# Patient Record
Sex: FEMALE | Race: WHITE | NOT HISPANIC OR LATINO | URBAN - METROPOLITAN AREA
[De-identification: names, ages, dates, MRNs, and addresses within clinical notes are randomized per-mention and may not be internally consistent; named-entity substitution may affect disease eponyms.]

---

## 2017-05-17 ENCOUNTER — GENERIC CONVERSION - ENCOUNTER (OUTPATIENT)
Dept: OTHER | Facility: OTHER | Age: 82
End: 2017-05-17

## 2017-05-17 LAB
AMBIG ABBREV CMP14 DEFAULT (HISTORICAL): NORMAL
AMBIG ABBREV LP DEFAULT (HISTORICAL): NORMAL

## 2017-05-18 LAB
A/G RATIO (HISTORICAL): 1.9 (ref 1.2–2.2)
ALBUMIN SERPL BCP-MCNC: 4.1 G/DL (ref 3.5–4.7)
ALP SERPL-CCNC: 94 IU/L (ref 39–117)
ALT SERPL W P-5'-P-CCNC: 31 IU/L (ref 0–32)
AST SERPL W P-5'-P-CCNC: 24 IU/L (ref 0–40)
BASOPHILS # BLD AUTO: 0.1 X10E3/UL (ref 0–0.2)
BASOPHILS # BLD AUTO: 1 %
BILIRUB SERPL-MCNC: 0.6 MG/DL (ref 0–1.2)
BUN SERPL-MCNC: 22 MG/DL (ref 8–27)
BUN/CREA RATIO (HISTORICAL): 26 (ref 12–28)
CALCIUM IONIZED (HISTORICAL): 6.1 MG/DL (ref 4.5–5.6)
CALCIUM SERPL-MCNC: 10.6 MG/DL (ref 8.7–10.3)
CHLORIDE SERPL-SCNC: 100 MMOL/L (ref 96–106)
CHOLEST SERPL-MCNC: 175 MG/DL (ref 100–199)
CO2 SERPL-SCNC: 25 MMOL/L (ref 18–29)
CREAT SERPL-MCNC: 0.85 MG/DL (ref 0.57–1)
DEPRECATED RDW RBC AUTO: 13.2 % (ref 12.3–15.4)
EGFR AFRICAN AMERICAN (HISTORICAL): 70 ML/MIN/1.73
EGFR-AMERICAN CALC (HISTORICAL): 61 ML/MIN/1.73
EOSINOPHIL # BLD AUTO: 0.2 X10E3/UL (ref 0–0.4)
EOSINOPHIL # BLD AUTO: 4 %
GLUCOSE SERPL-MCNC: 197 MG/DL (ref 65–99)
HBA1C MFR BLD HPLC: 8 % (ref 4.8–5.6)
HCT VFR BLD AUTO: 46 % (ref 34–46.6)
HDLC SERPL-MCNC: 58 MG/DL
HGB BLD-MCNC: 15.2 G/DL (ref 11.1–15.9)
IMM.GRANULOCYTES (CD4/8) (HISTORICAL): 0 %
IMM.GRANULOCYTES (CD4/8) (HISTORICAL): 0 X10E3/UL (ref 0–0.1)
LDLC SERPL CALC-MCNC: 84 MG/DL (ref 0–99)
LYMPHOCYTES # BLD AUTO: 1.4 X10E3/UL (ref 0.7–3.1)
LYMPHOCYTES # BLD AUTO: 27 %
MCH RBC QN AUTO: 29.6 PG (ref 26.6–33)
MCHC RBC AUTO-ENTMCNC: 33 G/DL (ref 31.5–35.7)
MCV RBC AUTO: 90 FL (ref 79–97)
MONOCYTES # BLD AUTO: 0.4 X10E3/UL (ref 0.1–0.9)
MONOCYTES (HISTORICAL): 8 %
NEUTROPHILS # BLD AUTO: 3.3 X10E3/UL (ref 1.4–7)
NEUTROPHILS # BLD AUTO: 60 %
PLATELET # BLD AUTO: 332 X10E3/UL (ref 150–379)
POTASSIUM SERPL-SCNC: 5 MMOL/L (ref 3.5–5.2)
PTH-INTACT SERPL-MCNC: 60 PG/ML (ref 15–65)
PTH-INTACT SERPL-MCNC: NORMAL PG/ML
RBC (HISTORICAL): 5.13 X10E6/UL (ref 3.77–5.28)
SODIUM SERPL-SCNC: 142 MMOL/L (ref 134–144)
TOT. GLOBULIN, SERUM (HISTORICAL): 2.2 G/DL (ref 1.5–4.5)
TOTAL PROTEIN (HISTORICAL): 6.3 G/DL (ref 6–8.5)
TRIGL SERPL-MCNC: 164 MG/DL (ref 0–149)
VLDLC SERPL CALC-MCNC: 33 MG/DL (ref 5–40)
WBC # BLD AUTO: 5.4 X10E3/UL (ref 3.4–10.8)

## 2017-05-19 ENCOUNTER — GENERIC CONVERSION - ENCOUNTER (OUTPATIENT)
Dept: OTHER | Facility: OTHER | Age: 82
End: 2017-05-19

## 2017-05-23 ENCOUNTER — ALLSCRIPTS OFFICE VISIT (OUTPATIENT)
Dept: OTHER | Facility: OTHER | Age: 82
End: 2017-05-23

## 2017-06-21 ENCOUNTER — GENERIC CONVERSION - ENCOUNTER (OUTPATIENT)
Dept: OTHER | Facility: OTHER | Age: 82
End: 2017-06-21

## 2017-08-23 DIAGNOSIS — E83.52 HYPERCALCEMIA: ICD-10-CM

## 2017-08-23 DIAGNOSIS — E11.65 TYPE 2 DIABETES MELLITUS WITH HYPERGLYCEMIA (HCC): ICD-10-CM

## 2017-09-29 ENCOUNTER — GENERIC CONVERSION - ENCOUNTER (OUTPATIENT)
Dept: OTHER | Facility: OTHER | Age: 82
End: 2017-09-29

## 2017-09-29 LAB
BASOPHILS # BLD AUTO: 0 X10E3/UL (ref 0–0.2)
BASOPHILS # BLD AUTO: 1 %
DEPRECATED RDW RBC AUTO: 13.4 % (ref 12.3–15.4)
EOSINOPHIL # BLD AUTO: 0.2 X10E3/UL (ref 0–0.4)
EOSINOPHIL # BLD AUTO: 4 %
HCT VFR BLD AUTO: 44.1 % (ref 34–46.6)
HGB BLD-MCNC: 14.8 G/DL (ref 11.1–15.9)
IMM.GRANULOCYTES (CD4/8) (HISTORICAL): 0 %
IMM.GRANULOCYTES (CD4/8) (HISTORICAL): 0 X10E3/UL (ref 0–0.1)
LYMPHOCYTES # BLD AUTO: 1.4 X10E3/UL (ref 0.7–3.1)
LYMPHOCYTES # BLD AUTO: 25 %
MCH RBC QN AUTO: 30 PG (ref 26.6–33)
MCHC RBC AUTO-ENTMCNC: 33.6 G/DL (ref 31.5–35.7)
MCV RBC AUTO: 89 FL (ref 79–97)
MONOCYTES # BLD AUTO: 0.5 X10E3/UL (ref 0.1–0.9)
MONOCYTES (HISTORICAL): 9 %
NEUTROPHILS # BLD AUTO: 3.6 X10E3/UL (ref 1.4–7)
NEUTROPHILS # BLD AUTO: 61 %
PLATELET # BLD AUTO: 294 X10E3/UL (ref 150–379)
RBC (HISTORICAL): 4.94 X10E6/UL (ref 3.77–5.28)
WBC # BLD AUTO: 5.8 X10E3/UL (ref 3.4–10.8)

## 2017-09-30 LAB
25(OH)D3 SERPL-MCNC: 28.5 NG/ML (ref 30–100)
A/G RATIO (HISTORICAL): 1.8 (ref 1.2–2.2)
ALBUMIN SERPL BCP-MCNC: 4.2 G/DL (ref 3.5–4.7)
ALP SERPL-CCNC: 101 IU/L (ref 39–117)
ALT SERPL W P-5'-P-CCNC: 29 IU/L (ref 0–32)
AST SERPL W P-5'-P-CCNC: 23 IU/L (ref 0–40)
BILIRUB SERPL-MCNC: 0.6 MG/DL (ref 0–1.2)
BUN SERPL-MCNC: 27 MG/DL (ref 8–27)
BUN/CREA RATIO (HISTORICAL): 29 (ref 12–28)
CALCIUM IONIZED (HISTORICAL): 5.9 MG/DL (ref 4.5–5.6)
CALCIUM SERPL-MCNC: 10.4 MG/DL (ref 8.7–10.3)
CHLORIDE SERPL-SCNC: 101 MMOL/L (ref 96–106)
CO2 SERPL-SCNC: 25 MMOL/L (ref 18–29)
CREAT SERPL-MCNC: 0.93 MG/DL (ref 0.57–1)
EGFR AFRICAN AMERICAN (HISTORICAL): 63 ML/MIN/1.73
EGFR-AMERICAN CALC (HISTORICAL): 55 ML/MIN/1.73
GAMMAGLOBULIN; IGM (HISTORICAL): 78 MG/DL (ref 26–217)
GLUCOSE SERPL-MCNC: 226 MG/DL (ref 65–99)
HBA1C MFR BLD HPLC: 8.3 % (ref 4.8–5.6)
IGA (HISTORICAL): 124 MG/DL (ref 64–422)
IMMUNOFIXATION ELECTROPHORESIS (HISTORICAL): NORMAL
POTASSIUM SERPL-SCNC: 4.8 MMOL/L (ref 3.5–5.2)
SODIUM SERPL-SCNC: 139 MMOL/L (ref 134–144)
TOT. GLOBULIN, SERUM (HISTORICAL): 2.3 G/DL (ref 1.5–4.5)
TOTAL IGG (HISTORICAL): 798 MG/DL (ref 700–1600)
TOTAL PROTEIN (HISTORICAL): 6.5 G/DL (ref 6–8.5)

## 2017-10-02 LAB
A/G RATIO (HISTORICAL): 1.2 (ref 0.7–1.7)
ALBUMIN SERPL BCP-MCNC: 3.5 G/DL (ref 2.9–4.4)
ALPHA 1 (HISTORICAL): 0.2 G/DL (ref 0–0.4)
ALPHA 2 (HISTORICAL): 1 G/DL (ref 0.4–1)
BETA-2 MICROGLOBULIN (HISTORICAL): 0.8 G/DL (ref 0.7–1.3)
GAMMA GLOBULIN (HISTORICAL): 0.9 G/DL (ref 0.4–1.8)
M-SPIKE, SERUM (HISTORICAL): NORMAL G/DL
PLEASE NOTE: (HISTORICAL): NORMAL
TOT. GLOBULIN, SERUM (HISTORICAL): 3 G/DL (ref 2.2–3.9)

## 2017-10-03 ENCOUNTER — ALLSCRIPTS OFFICE VISIT (OUTPATIENT)
Dept: OTHER | Facility: OTHER | Age: 82
End: 2017-10-03

## 2017-10-06 LAB
ALPHA 1 URINE (HISTORICAL): 0 %
ALPHA 2 URINE (HISTORICAL): 0 %
BETA-2 MICROGLOBULIN, URINE (HISTORICAL): 0 %
GAMMA GLOBULIN (HISTORICAL): 0 %
IMMUNOFIX RESULT, URINE (HISTORICAL): ABNORMAL
M-SPIKE %, URINE (HISTORICAL): ABNORMAL %
MAGNESIUM, UR (HISTORICAL): 100 %
NOTE: (HISTORICAL): ABNORMAL
PROTEIN 24 HOUR URINE (HISTORICAL): 158 MG/24 HR (ref 30–150)
TOTAL PROTEIN (HISTORICAL): 17.5 MG/DL

## 2018-01-13 VITALS
WEIGHT: 156 LBS | HEIGHT: 60 IN | OXYGEN SATURATION: 92 % | SYSTOLIC BLOOD PRESSURE: 130 MMHG | TEMPERATURE: 98 F | BODY MASS INDEX: 30.63 KG/M2 | HEART RATE: 68 BPM | DIASTOLIC BLOOD PRESSURE: 75 MMHG

## 2018-01-13 NOTE — PROGRESS NOTES
Assessment    1  Otitis externa (380 10) (H60 90)   2  Impacted cerumen of left ear (380 4) (H61 22)    Plan  Impacted cerumen of left ear    · Neomycin-Polymyxin-HC 3 [id number] Otic Suspension (Neomycin-Polymyxin-HC); INSTILL 3  DROPS IN AFFECTED EAR(S) 3-4 TIMES DAILY  Impacted cerumen of right ear    · Murine Ear 6 5 % Otic Solution; Instill 5-10 drops twice daily for up to 4 days   · Clean the outside of your ears with a washcloth ; Status:Complete;   Done: 42RRF4622 10:53AM   · How to use ear drops ; Status:Complete;   Done: 19ZRN5808 10:54AM   · Never put cotton swabs inside your ears ; Status:Complete;   Done: 36UFU0685 10:53AM   · To remove earwax at home, use a soft-tipped rubber ear syringe ; Status:Complete;   Done:  55JXJ8960 10:53AM   · Follow Up if Not Better Evaluation and Treatment  Follow-up  Status: Complete  Done: 11LBB0718  10:53AM    Discussion/Summary  Discussion Summary:   1 left otitis externa no Rx Cortisporin Otic suspension  2 earwax discussed using eardrops in urination once a month to prevent return to recheck as needed  Counseling Documentation With Imm: The patient was counseled regarding diagnostic results  Medication SE Review and Pt Understands Tx: The treatment plan was reviewed with the patient/guardian  The patient/guardian understands and agrees with the treatment plan      Chief Complaint  Chief Complaint Free Text Note Form: pt presents for ear irrigation  History of Present Illness  Otitis Externa (Brief): The patient is being seen for an initial evaluation of otitis externa  Symptoms:  ear pain and difficulty hearing  No associated symptoms are reported  Active Problems    1  Allergic rhinitis (477 9) (J30 9)   2  Alopecia (704 00) (L65 9)   3  Burning sensation (782 0) (R20 8)   4  Carpal tunnel syndrome (354 0) (G56 00)   5  Edema (782 3) (R60 9)   6  Elevated ALT measurement (790 4) (R74 0)   7   Encounter for screening mammogram for malignant neoplasm of breast (V76 12) (Z12 31)   8  Essential hypertension (401 9) (I10)   9  Flu vaccine need (V04 81) (Z23)   10  Hematuria (599 70) (R31 9)   11  Hypercalcemia (275 42) (E83 52)   12  Hyperlipidemia (272 4) (E78 5)   13  Hypertension (401 9) (I10)   14  Immunization, tetanus toxoid (V03 7) (Z23)   15  Lower back pain (724 2) (M54 5)   16  Metabolic disorder (522 1) (E88 9)   17  Need for pneumococcal vaccine (V03 82) (Z23)   18  Need for prophylactic vaccination and inoculation against influenza (V04 81) (Z23)   19  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)   20  Osteoarthrosis of knee (715 36) (M17 9)   21  Osteoporosis (733 00) (M81 0)   22  Rectocele (618 04) (N81 6)   23  Screening for osteoporosis (V82 81) (Z13 820)   24  Solar dermatitis (692 70) (L57 8)   25  Type 2 diabetes mellitus with hyperglycemia (250 00) (E11 65)   26  UTI (urinary tract infection), bacterial (599 0,041 9) (N39 0,A49  9)    Past Medical History    1  History of Acute foot pain (729 5) (M79 673)   2  History of Asymptomatic menopausal state (V49 81) (Z78 0)   3  History of Encounter for routine gynecological examination (V72 31) (Z01 419)   4  History of Encounter for screening mammogram for malignant neoplasm of breast (V76 12) (Z12 31)   5  History of Impacted cerumen of right ear (380 4) (H61 21)   6  Need for prophylactic vaccination and inoculation against influenza (V04 81) (Z23)   7  History of Normal routine physical examination (V70 0) (Z00 00)   8  Personal history of urinary tract infection (V13 02) (Z87 440)   9  History of Skin tag (701 9) (L91 8)   10  History of Trigger finger (727 03) (M65 30)    Surgical History    1  History of Hysterectomy   2  History of Knee Replacement   3  History of Tonsillectomy    Family History    1  Family history of     2  Family history of     3  Family history of Cancer   4   Family history of Heart Disease (V17 49)    Social History    · Caffeine Use   · Denied: Currently sexually active   · Exercising Regularly   · Never A Smoker   · Never Drank Alcohol   · Never Used Drugs    Current Meds   1  Alendronate Sodium 70 MG Oral Tablet; TAKE ONE TABLET BY MOUTH ONCE A WEEK 30-60   MINUTES PRIOR TO BREAKFAST ON AN EMPTY STOMACH  DO NOT LIE DOWN AFTER TAKING   MEDICATION; Therapy: 02FTR7494 to (Last Rx:37Khi8162)  Requested for: 08QSD4376 Ordered   2  AmLODIPine Besylate 5 MG Oral Tablet; Take 1 tablet daily; Therapy: 59TAK0175 to ([facility])  Requested for: 84TGC6807; Last Rx:03Nov2015   Ordered   3  Labetalol HCl - 200 MG Oral Tablet; Take 1 tablet twice daily; Therapy: 64Bgg1287 to (Evaluate:03Jan2016)  Requested for: 99WNW0893; Last Rx:54Wxo5110   Ordered   4  Simvastatin 20 MG Oral Tablet; TAKE 1 TABLET DAILY IN THE EVENING; Therapy: 85ICY3176 to 0699 183 83 86)  Requested for: 21AWJ3399; Last Rx:03Nov2015   Ordered   5  Spironolactone 25 MG Oral Tablet; TAKE 1 TABLET DAILY; Therapy: 08Apr2013 to (Evaluate:28Oct2016)  Requested for: 86DZN2667; Last Rx:03Nov2015   Ordered    Allergies    1  Lotensin TABS    Vitals  Vital Signs [Data Includes: Current Encounter]    Recorded: 59JDM1613 10:24AM   Temperature 97 2 F   Heart Rate 73   Height 5 ft    Weight 157 lb 6 08 oz   BMI Calculated 30 74   BSA Calculated 1 68   O2 Saturation 91     Physical Exam    Ears, Nose, Mouth, and Throat   Otoscopic examination: Abnormal   The left tympanic membrane was normal  The right external canal was erythematous  The left external canal was normal         Procedure    Procedure: cerumen removal    Indication: tympanic membrane(s) could not be visualized in the right ear  Procedure Note: The procedure was performed by the Provider  A otoscope was placed in the ear canal(s) to visualize the ear canal debris  The ear was cleaned by using warm water irrigation and a curette  The procedure was successful  Post-Procedure:   Complications: there were no complications        Health Management  Nontoxic single thyroid nodule   U/S Thyroid; every 1 year; Last 87Nnr4312; Next Due: 03WWN2035; Active  Osteoporosis   * Dexa Scan Axial Skel (Hip, Pelvis, Spine); every 2 years; Last 07ZJO6235; Next Due: 19Bbg6278;   Active    Future Appointments    Date/Time Provider Specialty Site   06/28/2016 09:15 AM Eloy Modi DO Internal Medicine [address] [facility] INTERNAL [address]     Signatures   Electronically signed by : Maryse Yung DO; Jan 19 2016 10:55AM EST                       (Author)

## 2018-01-14 NOTE — PROGRESS NOTES
Plan  Health Maintenance    · Zoster (Zostavax) (Zoster (Zostavax)); INJECT 1 ML Once    History of Present Illness  Welcome to [employer] and Wellness Visits: The patient is being seen for the initial annual wellness visit  Medicare Screening and Risk Factors   Hospitalizations: no previous hospitalizations  Once per lifetime medicare screening tests: AAA screening US has not yet been done  Medicare Screening Tests Risk Questions   Osteoporosis risk assessment: , female gender, over 48years of age and past medical history of fracture(s)  HIV risk assessment: none indicated  Drug and Alcohol Use: The patient has never smoked cigarettes  The patient reports never drinking alcohol  She has never used illicit drugs  Diet and Physical Activity: Current diet includes well balanced meals  Mood Disorder and Cognitive Impairment Screening: PHQ-9 Depression Scale   Over the past 2 weeks, how often have you been bothered by the following problems? 1 ) Little interest or pleasure in doing things? Not at all    2 ) Feeling down, depressed or hopeless? Not at all    3 ) Trouble falling asleep or sleeping too much? Not at all    4 ) Feeling tired or having little energy? Not at all    5 ) Poor appetite or overeating? Not at all    6 ) Feeling bad about yourself, or that you are a failure, or have let yourself or your family down? Not at all    7 ) Trouble concentrating on things, such as reading a newspaper or watching television? Not at all    8 ) Moving or speaking so slowly that other people could have noticed, or the opposite, moving or speaking faster than usual? Not at all  How difficult have these problems made it for you to do your work, take care of things at home, or get along with people? Not at all  She denies feeling down, depressed, or hopeless over the past two weeks  She denies feeling little interest or pleasure in doing things over the past two weeks     Cognitive impairment screening: denies difficulty learning/retaining new information, denies difficulty handling complex tasks, denies difficulty with reasoning, denies difficulty with spatial ability and orientation, denies difficulty with language and denies difficulty with behavior  Functional Ability/Level of Safety: She reports hearing difficulties  She uses a hearing aid  The patient is currently able to do activities of daily living without limitations, able to do instrumental activities of daily living without limitations, able to participate in social activities without limitations and able to drive without limitations  Fall risk factors: The patient fell 0 times in the past 12 months  Advance Directives: Advance directives: living will and durable power of  for health care directives  Co-Managers and Medical Equipment/Suppliers: See Patient Care Team      Active Problems    1  Allergic rhinitis (477 9) (J30 9)   2  Alopecia (704 00) (L65 9)   3  Burning sensation (782 0) (R20 8)   4  Carpal tunnel syndrome (354 0) (G56 00)   5  Edema (782 3) (R60 9)   6  Encounter for screening mammogram for malignant neoplasm of breast (V76 12)   (Z12 31)   7  Essential hypertension (401 9) (I10)   8  Flu vaccine need (V04 81) (Z23)   9  Hematuria (599 70) (R31 9)   10  Hypercalcemia (275 42) (E83 52)   11  Hyperlipidemia (272 4) (E78 5)   12  Hypertension (401 9) (I10)   13  Immunization, tetanus toxoid (V03 7) (Z23)   14  Lower back pain (724 2) (M54 5)   15  Metabolic disorder (959 7) (E88 9)   16  Need for pneumococcal vaccine (V03 82) (Z23)   17  Need for prophylactic vaccination and inoculation against influenza (V04 81) (Z23)   18  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)   19  Osteoarthrosis of knee (715 36) (M17 10)   20  Osteoporosis (733 00) (M81 0)   21  Otitis externa (380 10) (H60 90)   22  Rectocele (618 04) (N81 6)   23  Screening for osteoporosis (V82 81) (Z13 820)   24  Solar dermatitis (692 70) (L57 8)   25  Type 2 diabetes mellitus with hyperglycemia (250 00) (E11 65)   26  UTI (urinary tract infection), bacterial (599 0,041 9) (N39 0,A49  9)    Past Medical History    · History of Acute foot pain (729 5) (M79 673)   · History of Asymptomatic menopausal state (V49 81) (Z78 0)   · History of Elevated ALT measurement (790 4) (R74 0)   · History of Encounter for routine gynecological examination (V72 31) (Z01 419)   · History of Encounter for screening mammogram for malignant neoplasm of breast  (V76 12) (Z12 31)   · History of Impacted cerumen of left ear (380 4) (H61 22)   · History of Impacted cerumen of right ear (380 4) (H61 21)   · Need for prophylactic vaccination and inoculation against influenza (V04 81) (Z23)   · History of Normal routine physical examination (V70 0) (Z00 00)   · Personal history of urinary tract infection (V13 02) (Z87 440)   · History of Skin tag (701 9) (L91 8)   · History of Trigger finger (727 03) (M65 30)    Surgical History    · History of Hysterectomy   · History of Knee Replacement   · History of Tonsillectomy    Family History  Mother    · Family history of   Father    · Family history of   Family History    · Family history of Cancer   · Family history of Heart Disease (V17 49)    Social History    · Caffeine Use   · Denied: Currently sexually active   · Exercising Regularly   · Never A Smoker   · Never Drank Alcohol   · Never Used Drugs    Current Meds   1  Alendronate Sodium 70 MG Oral Tablet; TAKE ONE TABLET BY MOUTH ONCE A WEEK   30-60 MINUTES PRIOR TO BREAKFAST ON AN EMPTY STOMACH  DO NOT LIE   DOWN AFTER TAKING MEDICATION; Therapy: 95ATI5011 to (Last Rx:74Cbb4992)  Requested for: 57GQQ0565 Ordered   2  AmLODIPine Besylate 5 MG Oral Tablet; Take 1 tablet daily; Therapy: 98ZBW4054 to ([phone])  Requested for: 90WMY4939; Last   Rx:13Zcy1420 Ordered   3  Labetalol HCl - 200 MG Oral Tablet; Take 1 tablet twice daily;    Therapy: 2013 to (Evaluate:2017)  Requested for: 97URT3417; Last   Rx:96Myq2959 Ordered   4  Simvastatin 20 MG Oral Tablet; TAKE 1 TABLET EVERY DAY IN THE EVENING; Therapy: 55IRQ7697 to (William Mcpherson)  Requested for: 13XPQ6065; Last   Rx:87Zyu5866 Ordered   5  Spironolactone 25 MG Oral Tablet; TAKE 1 TABLET DAILY; Therapy: 57Xys2240 to (Evaluate:2017)  Requested for: 79IDP7663; Last   Rx:86Epn1084 Ordered    Allergies    1  Lotensin TABS    Immunizations   ** Printed in Appendix #1 below  Vitals  Signs    Temperature: 97 9 F  Heart Rate: 67  Height: 5 ft 1 in  Weight: 158 lb 8 oz  BMI Calculated: 29 95  BSA Calculated: 1 71  O2 Saturation: 94    Health Management  Nontoxic single thyroid nodule   U/S Thyroid; every 1 year; Last 24Ucj1568; Next Due: 55Ebq4992; Overdue  Osteoporosis   * Dexa Scan Axial Skel (Hip, Pelvis, Spine); every 2 years; Last 92WNI1068;  Next Due:  34Rwx8090; Near Due    Signatures   Electronically signed by : Aldo Nicole DO; Oct  6 2017  9:01AM EST                          Appendix #1     Patient: Aster Truong ; : 1928; MRN: [id number]      1 2 3 4 5    Influenza  18-Oct-2011 05-Nov-2013 17-Dec-2014 05-HFK-1825 2016    PCV  2015        PPSV  06-Oct-1997 31-Oct-2006       Tdap  16-Dec-2015

## 2018-01-14 NOTE — RESULT NOTES
Verified Results  (1) CBC/PLT/DIFF [id number] 08:16AM [facility] [address]     Test Name Result Flag Reference   WBC 5 4 x10E3/uL  3 4-10 8   RBC 5 13 x10E6/uL  3 77-5 28   Hemoglobin 15 2 g/dL  11 1-15 9   Hematocrit 46 0 %  34 0-46  6   MCV 90 fL  79-97   MCH 29 6 pg  26 6-33 0   MCHC 33 0 g/dL  31 5-35 7   RDW 13 2 %  12 3-15 4   Platelets 590 V08V0/FY  150-379   Neutrophils 60 %     Lymphs 27 %     Monocytes 8 %     Eos 4 %     Basos 1 %     Neutrophils (Absolute) 3 3 x10E3/uL  1 4-7 0   Lymphs (Absolute) 1 4 x10E3/uL  0 7-3 1   Monocytes(Absolute) 0 4 x10E3/uL  0 1-0 9   Eos (Absolute) 0 2 x10E3/uL  0 0-0 4   Baso (Absolute) 0 1 x10E3/uL  0 0-0 2   Immature Granulocytes 0 %     Immature Grans (Abs) 0 0 x10E3/uL  0 0-0 1     (1) COMPREHENSIVE METABOLIC PANEL [id number] 67:25PV [facility] [address]     Test Name Result Flag Reference   Glucose, Serum 197 mg/dL H 65-99   BUN 22 mg/dL  8-27   Creatinine, Serum 0 85 mg/dL  0 57-1 00   BUN/Creatinine Ratio 26  12-28   Sodium, Serum 142 mmol/L  134-144   Potassium, Serum 5 0 mmol/L  3 5-5 2   Chloride, Serum 100 mmol/L     Carbon Dioxide, Total 25 mmol/L  18-29   Calcium, Serum 10 6 mg/dL H 8 7-10 3   **Verified by repeat analysis**   Protein, Total, Serum 6 3 g/dL  6 0-8 5   Albumin, Serum 4 1 g/dL  3 5-4 7   Globulin, Total 2 2 g/dL  1 5-4 5   A/G Ratio 1 9  1 2-2 2   Bilirubin, Total 0 6 mg/dL  0 0-1 2   Alkaline Phosphatase, S 94 IU/L     AST (SGOT) 24 IU/L  0-40   ALT (SGPT) 31 IU/L  0-32   eGFR If NonAfricn Am 61 mL/min/1 73  >59   eGFR If Africn Am 70 mL/min/1 73  >59     (LC) Lipid Panel [id number] 08:16AM [facility] [address]     Test Name Result Flag Reference   Cholesterol, Total 175 mg/dL  100-199   Triglycerides 164 mg/dL H 0-149   HDL Cholesterol 58 mg/dL  >39   VLDL Cholesterol Baljinder 33 mg/dL  5-40   LDL Cholesterol Calc 84 mg/dL  0-99     [facility]) Gabby COBOS Default [id number] 08:16AM [facility]     Test Name Result Flag Reference   Gabby COBOS Default Comment     A hand-written panel/profile was received from your office  In  accordance with the [facility] Ambiguous Test Code Policy dated July 0009, we have completed your order by using the closest currently  or formerly recognized AMA panel  We have assigned Comprehensive  Metabolic Panel (14), Test Code #809082 to this request   If this  is not the testing you wished to receive on this specimen, please  contact the [address] [place] [address] Client Inquiry/Technical Services Department  to clarify the test order  We appreciate your business  [facility]) Henry Messer LP Default [id number] 08:16AM [facility]     Test Name Result Flag Reference   Prem Julian LP Default Comment     A hand-written panel/profile was received from your office  In  accordance with the [facility] Ambiguous Test Code Policy dated July 0199, we have completed your order by using the closest currently  or formerly recognized AMA panel  We have assigned Lipid Panel,  Test Code #632396 to this request  If this is not the testing you  wished to receive on this specimen, please contact the [address] [place] [address]  Client Inquiry/Technical Services Department to clarify the test  order  We appreciate your business       (1) HEMOGLOBIN A1C [id number] 08:16AM [facility]     Test Name Result Flag Reference   Hemoglobin A1c 8 0 % H 4 8-5 6   Pre-diabetes: 5 7 - 6 4           Diabetes: >6 4           Glycemic control for adults with diabetes: <7 0     () Ca+PTH Intact [id number] 08:16AM [facility]     Test Name Result Flag Reference   PTH, Intact 60 pg/mL  15-65   Intact PTH Comment     Interpretation                 Intact PTH    Calcium                                                 (pg/mL)      (mg/dL)                 Normal                          15 - 65     8 6 - 10 2                 Primary Hyperparathyroidism         >65          >10 2                 Secondary Hyperparathyroidism       >65          <10 2                 Non-Parathyroid Hypercalcemia       <65 >10 2                 Hypoparathyroidism                  <15          < 8 6                 Non-Parathyroid Hypocalcemia    15 - 65          < 8 6     (1) CALCIUM IONIZED [id number] 08:16AM Cher Madeline, [facility]     Test Name Result Flag Reference   Calcium, Ionized, Serum 6 1 mg/dL H 4 5-5 6

## 2018-01-22 VITALS
BODY MASS INDEX: 29.92 KG/M2 | SYSTOLIC BLOOD PRESSURE: 140 MMHG | DIASTOLIC BLOOD PRESSURE: 72 MMHG | OXYGEN SATURATION: 94 % | TEMPERATURE: 97.9 F | HEART RATE: 67 BPM | HEIGHT: 61 IN | WEIGHT: 158.5 LBS

## 2018-02-03 DIAGNOSIS — E11.65 TYPE 2 DIABETES MELLITUS WITH HYPERGLYCEMIA (HCC): ICD-10-CM

## 2018-02-03 DIAGNOSIS — I10 ESSENTIAL (PRIMARY) HYPERTENSION: ICD-10-CM

## 2018-02-03 DIAGNOSIS — E78.5 HYPERLIPIDEMIA: ICD-10-CM

## 2018-02-03 DIAGNOSIS — E83.52 HYPERCALCEMIA: ICD-10-CM

## 2018-02-03 DIAGNOSIS — E55.9 VITAMIN D DEFICIENCY: ICD-10-CM

## 2018-02-03 DIAGNOSIS — M81.0 AGE-RELATED OSTEOPOROSIS WITHOUT CURRENT PATHOLOGICAL FRACTURE: ICD-10-CM

## 2018-05-01 ENCOUNTER — OFFICE VISIT (OUTPATIENT)
Dept: INTERNAL MEDICINE CLINIC | Facility: CLINIC | Age: 83
End: 2018-05-01
Payer: MEDICARE

## 2018-05-01 VITALS
BODY MASS INDEX: 29.11 KG/M2 | HEART RATE: 65 BPM | TEMPERATURE: 98.5 F | SYSTOLIC BLOOD PRESSURE: 130 MMHG | HEIGHT: 61 IN | RESPIRATION RATE: 20 BRPM | WEIGHT: 154.2 LBS | DIASTOLIC BLOOD PRESSURE: 70 MMHG | OXYGEN SATURATION: 95 %

## 2018-05-01 DIAGNOSIS — I10 HYPERTENSION, UNSPECIFIED TYPE: ICD-10-CM

## 2018-05-01 DIAGNOSIS — E55.9 VITAMIN D DEFICIENCY: ICD-10-CM

## 2018-05-01 DIAGNOSIS — E11.65 TYPE 2 DIABETES MELLITUS WITH HYPERGLYCEMIA, WITHOUT LONG-TERM CURRENT USE OF INSULIN (HCC): ICD-10-CM

## 2018-05-01 DIAGNOSIS — M81.0 OSTEOPOROSIS, UNSPECIFIED OSTEOPOROSIS TYPE, UNSPECIFIED PATHOLOGICAL FRACTURE PRESENCE: ICD-10-CM

## 2018-05-01 DIAGNOSIS — E83.52 HYPERCALCEMIA: ICD-10-CM

## 2018-05-01 DIAGNOSIS — Z23 NEED FOR SHINGLES VACCINE: ICD-10-CM

## 2018-05-01 DIAGNOSIS — E78.5 HYPERLIPIDEMIA, UNSPECIFIED HYPERLIPIDEMIA TYPE: ICD-10-CM

## 2018-05-01 DIAGNOSIS — I10 ESSENTIAL HYPERTENSION: Primary | ICD-10-CM

## 2018-05-01 PROBLEM — M19.049 DEGENERATIVE JOINT DISEASE OF HAND: Status: ACTIVE | Noted: 2018-05-01

## 2018-05-01 LAB
25(OH)D3+25(OH)D2 SERPL-MCNC: 48.1 NG/ML (ref 30–100)
ALBUMIN SERPL-MCNC: 4.1 G/DL (ref 3.5–4.7)
ALBUMIN/GLOB SERPL: 1.5 {RATIO} (ref 1.2–2.2)
ALP SERPL-CCNC: 104 IU/L (ref 39–117)
ALT SERPL-CCNC: 20 IU/L (ref 0–32)
AST SERPL-CCNC: 18 IU/L (ref 0–40)
BASOPHILS # BLD AUTO: 0 X10E3/UL (ref 0–0.2)
BASOPHILS NFR BLD AUTO: 1 %
BILIRUB SERPL-MCNC: 0.5 MG/DL (ref 0–1.2)
BUN SERPL-MCNC: 31 MG/DL (ref 8–27)
BUN/CREAT SERPL: 32 (ref 12–28)
CA-I SERPL ISE-MCNC: 6.1 MG/DL (ref 4.5–5.6)
CALCIUM SERPL-MCNC: 10.4 MG/DL (ref 8.7–10.3)
CHLORIDE SERPL-SCNC: 101 MMOL/L (ref 96–106)
CO2 SERPL-SCNC: 26 MMOL/L (ref 18–29)
CREAT SERPL-MCNC: 0.97 MG/DL (ref 0.57–1)
EOSINOPHIL # BLD AUTO: 0.2 X10E3/UL (ref 0–0.4)
EOSINOPHIL NFR BLD AUTO: 3 %
ERYTHROCYTE [DISTWIDTH] IN BLOOD BY AUTOMATED COUNT: 13.4 % (ref 12.3–15.4)
GLOBULIN SER-MCNC: 2.7 G/DL (ref 1.5–4.5)
GLUCOSE SERPL-MCNC: 210 MG/DL (ref 65–99)
HBA1C MFR BLD: 8.4 % (ref 4.8–5.6)
HCT VFR BLD AUTO: 46.4 % (ref 34–46.6)
HGB BLD-MCNC: 15.1 G/DL (ref 11.1–15.9)
IMM GRANULOCYTES # BLD: 0 X10E3/UL (ref 0–0.1)
IMM GRANULOCYTES NFR BLD: 0 %
LYMPHOCYTES # BLD AUTO: 1.5 X10E3/UL (ref 0.7–3.1)
LYMPHOCYTES NFR BLD AUTO: 22 %
MCH RBC QN AUTO: 30.1 PG (ref 26.6–33)
MCHC RBC AUTO-ENTMCNC: 32.5 G/DL (ref 31.5–35.7)
MCV RBC AUTO: 92 FL (ref 79–97)
MONOCYTES # BLD AUTO: 0.4 X10E3/UL (ref 0.1–0.9)
MONOCYTES NFR BLD AUTO: 6 %
NEUTROPHILS # BLD AUTO: 4.6 X10E3/UL (ref 1.4–7)
NEUTROPHILS NFR BLD AUTO: 68 %
PLATELET # BLD AUTO: 374 X10E3/UL (ref 150–379)
POTASSIUM SERPL-SCNC: 4.7 MMOL/L (ref 3.5–5.2)
PROT SERPL-MCNC: 6.8 G/DL (ref 6–8.5)
PTH-INTACT SERPL-MCNC: 37 PG/ML (ref 15–65)
RBC # BLD AUTO: 5.02 X10E6/UL (ref 3.77–5.28)
SL AMB EGFR AFRICAN AMERICAN: 60 ML/MIN/1.73
SL AMB EGFR NON AFRICAN AMERICAN: 52 ML/MIN/1.73
SODIUM SERPL-SCNC: 142 MMOL/L (ref 134–144)
TSH SERPL DL<=0.005 MIU/L-ACNC: 0.8 UIU/ML (ref 0.45–4.5)
WBC # BLD AUTO: 6.7 X10E3/UL (ref 3.4–10.8)

## 2018-05-01 PROCEDURE — 99214 OFFICE O/P EST MOD 30 MIN: CPT | Performed by: INTERNAL MEDICINE

## 2018-05-01 RX ORDER — ALENDRONATE SODIUM 70 MG/1
TABLET ORAL
COMMUNITY
Start: 2015-12-16 | End: 2018-08-14

## 2018-05-01 RX ORDER — GLIMEPIRIDE 1 MG/1
TABLET ORAL
Qty: 90 TABLET | Refills: 2 | Status: SHIPPED | OUTPATIENT
Start: 2018-05-01 | End: 2018-08-14

## 2018-05-01 RX ORDER — ACETAMINOPHEN 160 MG
1 TABLET,DISINTEGRATING ORAL DAILY
COMMUNITY
Start: 2017-10-03 | End: 2019-01-01 | Stop reason: HOSPADM

## 2018-05-01 RX ORDER — SIMVASTATIN 20 MG
1 TABLET ORAL DAILY
COMMUNITY
Start: 2011-06-06 | End: 2018-11-20 | Stop reason: SDUPTHER

## 2018-05-01 RX ORDER — LABETALOL 200 MG/1
1 TABLET, FILM COATED ORAL 2 TIMES DAILY
COMMUNITY
Start: 2013-04-08 | End: 2018-08-14 | Stop reason: SDUPTHER

## 2018-05-01 RX ORDER — PIOGLITAZONEHYDROCHLORIDE 15 MG/1
1 TABLET ORAL DAILY
COMMUNITY
Start: 2017-10-03 | End: 2018-10-24 | Stop reason: SDUPTHER

## 2018-05-01 RX ORDER — AMLODIPINE BESYLATE 5 MG/1
1 TABLET ORAL DAILY
COMMUNITY
Start: 2012-12-07 | End: 2018-06-06 | Stop reason: SDUPTHER

## 2018-05-01 RX ORDER — SPIRONOLACTONE 25 MG/1
1 TABLET ORAL DAILY
COMMUNITY
Start: 2013-04-08 | End: 2018-06-06 | Stop reason: SDUPTHER

## 2018-05-01 NOTE — PROGRESS NOTES
Assessment/Plan:    1  Type 2 diabetes A1c is over 8 will add glimepiride 1 mg half a tab before the evening meal recheck in 3 months will check her A1c in the office  2  Hypercalcemia is stable does have hyperparathyroidism but is asymptomatic at this time  3  Hypertension is at goal  4  Alopecia is stable with spironolactone  5  Patient with thyroid nodule review at with next visit in 3 months  6  Bilateral carpal tunnel syndrome is stable not interested in surgery at this time is using wrist splints at night         Diagnoses and all orders for this visit:    Essential hypertension    Type 2 diabetes mellitus with hyperglycemia, without long-term current use of insulin ([facility] [address] )  -     HEMOGLOBIN A1C W/ EAG ESTIMATION; Future  -     glimepiride (AMARYL) 1 mg tablet; Take 1/2 tablet before evening meal  -     HEMOGLOBIN A1C W/ EAG ESTIMATION; Future    Hypertension, unspecified type  -     CBC and differential; Future  -     Comprehensive metabolic panel; Future    Osteoporosis, unspecified osteoporosis type, unspecified pathological fracture presence    Vitamin D deficiency  -     Vitamin D 25 hydroxy; Future    Hypercalcemia  -     HEMOGLOBIN A1C W/ EAG ESTIMATION; Future  -     PTH, intact; Future  -     Calcium, ionized; Future    Hyperlipidemia, unspecified hyperlipidemia type    Need for shingles vaccine  -     Zoster Vac Recomb Adjuvanted (SHINGRIX) 50 MCG SUSR; Inject 50 mcg into the shoulder, thigh, or buttocks once for 1 dose    Other orders  -     alendronate (FOSAMAX) 70 mg tablet; Take by mouth  -     amLODIPine (NORVASC) 5 mg tablet; Take 1 tablet by mouth daily  -     labetalol (NORMODYNE) 200 mg tablet; Take 1 tablet by mouth 2 (two) times a day  -     pioglitazone (ACTOS) 15 mg tablet; Take 1 tablet by mouth daily  -     simvastatin (ZOCOR) 20 mg tablet; Take 1 tablet by mouth Daily  -     spironolactone (ALDACTONE) 25 mg tablet;  Take 1 tablet by mouth daily  -     Cholecalciferol (VITAMIN D3) 2000 units capsule; Take 1 capsule by mouth daily         Scheduled Meds:  Continuous Infusions:  No current facility-administered medications for this visit  PRN Meds:   Scheduled Meds:  Continuous Infusions:  No current facility-administered medications for this visit  Scheduled Meds:    Current Outpatient Prescriptions:     alendronate (FOSAMAX) 70 mg tablet, Take by mouth, Disp: , Rfl:     amLODIPine (NORVASC) 5 mg tablet, Take 1 tablet by mouth daily, Disp: , Rfl:     Cholecalciferol (VITAMIN D3) 2000 units capsule, Take 1 capsule by mouth daily, Disp: , Rfl:     labetalol (NORMODYNE) 200 mg tablet, Take 1 tablet by mouth 2 (two) times a day, Disp: , Rfl:     pioglitazone (ACTOS) 15 mg tablet, Take 1 tablet by mouth daily, Disp: , Rfl:     simvastatin (ZOCOR) 20 mg tablet, Take 1 tablet by mouth Daily, Disp: , Rfl:     spironolactone (ALDACTONE) 25 mg tablet, Take 1 tablet by mouth daily, Disp: , Rfl:     glimepiride (AMARYL) 1 mg tablet, Take 1/2 tablet before evening meal, Disp: 90 tablet, Rfl: 2    Zoster Vac Recomb Adjuvanted (SHINGRIX) 50 MCG SUSR, Inject 50 mcg into the shoulder, thigh, or buttocks once for 1 dose, Disp: 1 each, Rfl: 1      The patient was counseled regarding instructions for management, risk factor reductions, patient and family education,impressions, risks and benefits of treatment options, side effects of medications, importance of compliance with treatment  The treatment plan was reviewed with the patient/guardian and patient/guardian understands and agrees with the treatment plan  Subjective:      Patient ID: Kelsey Angela is a 80 y o  female  Hand numb at night bilaterally and which she uses it for hair dressing        The following portions of the patient's history were reviewed and updated as appropriate:   She has a past medical history of Hematuria ,   does not have any pertinent problems on file  ,   has a past surgical history that includes Hysterectomy; Replacement total knee; and Tonsillectomy  ,  family history includes Cancer in her family; Heart disease in her family  ,   reports that she has never smoked  She has never used smokeless tobacco  She reports that she does not drink alcohol or use drugs  ,  is allergic to benazepril       Review of Systems   Constitutional: Negative for appetite change, chills, fatigue, fever and unexpected weight change  HENT: Negative for congestion, ear pain, facial swelling, hearing loss, mouth sores, nosebleeds, postnasal drip, rhinorrhea, sinus pain, sore throat, trouble swallowing and voice change  Eyes: Negative for pain, discharge, redness and visual disturbance  Respiratory: Negative for apnea, chest tightness, shortness of breath, wheezing and stridor  Cardiovascular: Negative for chest pain, palpitations and leg swelling  Gastrointestinal: Negative for abdominal distention, abdominal pain, blood in stool, constipation, diarrhea and vomiting  Endocrine: Negative for cold intolerance, heat intolerance, polydipsia, polyphagia and polyuria  Genitourinary: Negative for difficulty urinating, dysuria, flank pain, frequency, genital sores, hematuria and urgency  Musculoskeletal: Negative for arthralgias and back pain  Skin: Negative for rash and wound  Allergic/Immunologic: Negative for environmental allergies, food allergies and immunocompromised state  Neurological: Positive for numbness  Negative for dizziness, tremors, seizures, syncope, facial asymmetry, speech difficulty, weakness, light-headedness and headaches  Hematological: Negative for adenopathy  Does not bruise/bleed easily  Psychiatric/Behavioral: Negative for agitation, behavioral problems, dysphoric mood, hallucinations, self-injury, sleep disturbance and suicidal ideas  The patient is not hyperactive  Patient's shoes and socks removed  Right Foot/Ankle   Right Foot Inspection  Skin Exam: skin normal and skin intact no dry skin, no warmth, no callus, no erythema, no maceration, no abnormal color, no pre-ulcer, no ulcer and no callus                          Toe Exam: no swelling, no tenderness, erythema and  no right toe deformity  Sensory   Vibration: intact  Proprioception: intact   Monofilament testing: intact  Vascular    The right DP pulse is 1+  The right PT pulse is 1+  Right Toe  - Comprehensive Exam  Ecchymosis: none  Swelling: none   Tenderness: none         Left Foot/Ankle  Left Foot Inspection  Skin Exam: skin normal and skin intactno dry skin, no warmth, no erythema, no maceration, normal color, no pre-ulcer, no ulcer and no callus                         Toe Exam: no swelling, no tenderness, no erythema and no left toe deformity                   Sensory   Vibration: intact  Proprioception: intact  Monofilament: intact  Vascular    The left DP pulse is 1+  The left PT pulse is 1+  Left Toe  - Comprehensive Exam  Ecchymosis: none  Swelling: none   Tenderness: none       Assign Risk Category:  No deformity present; ;        Risk: 0      Objective:     Physical Exam   Constitutional: She is oriented to person, place, and time  She appears well-developed  HENT:   Right Ear: External ear normal    Left Ear: External ear normal    Eyes: Right eye exhibits no discharge  Left eye exhibits no discharge  No scleral icterus  Neck: Carotid bruit is not present  No tracheal deviation present  No thyroid mass and no thyromegaly present  Cardiovascular: Normal rate, regular rhythm, normal heart sounds and intact distal pulses  Exam reveals no gallop and no friction rub  No murmur heard  Pulses:       Dorsalis pedis pulses are 1+ on the right side, and 1+ on the left side  Posterior tibial pulses are 1+ on the right side, and 1+ on the left side  Pulmonary/Chest: No respiratory distress  She has no wheezes  She has no rales  Musculoskeletal: She exhibits no edema     Feet:   Right Foot:   Skin Integrity: Negative for ulcer, skin breakdown, erythema, warmth, callus or dry skin  Left Foot:   Skin Integrity: Negative for ulcer, skin breakdown, erythema, warmth, callus or dry skin  Lymphadenopathy:     She has no cervical adenopathy  Neurological: She is alert and oriented to person, place, and time  Coordination normal    Pos tinnels bilaterally   Psychiatric: She has a normal mood and affect  Her behavior is normal  Judgment and thought content normal    Nursing note and vitals reviewed        Vitals:    05/01/18 1053 05/01/18 1130   BP:  130/70   BP Location:  Left arm   Patient Position:  Sitting   Pulse: 65    Resp: 20    Temp: 98 5 °F (36 9 °C)    TempSrc: Tympanic    SpO2: 95%    Weight: 69 9 kg (154 lb 3 2 oz)    Height: 5' 1" (1 549 m)

## 2018-05-01 NOTE — PATIENT INSTRUCTIONS
Basic Carbohydrate Counting   AMBULATORY CARE:   Carbohydrate counting  is a way to plan your meals by counting the amount of carbohydrate in foods  Carbohydrates are the sugars, starches, and fiber found in fruit, grains, vegetables, and milk products  Carbohydrates increase your blood sugar levels  Carbohydrate counting can help you eat the right amount of carbohydrate to keep your blood sugar levels under control  What you need to know about planning meals using carbohydrate counting:  · A dietitian or healthcare provider will help you develop a healthy meal plan that works best for you  You will be taught how much carbohydrate to eat or drink for each meal and snack  Your meal plan will be based on your age, weight, usual food intake, and physical activity level  If you have diabetes, it will also include your blood sugar levels and diabetes medicine  Once you know how much carbohydrate you should eat, you can decide what type of food you want to eat  · You will need to know what foods contain carbohydrate and how much they contain  Keep track of the amount of carbohydrate in meals and snacks in order to follow your meal plan  Do not avoid carbohydrates or skip meals  Your blood sugar may fall too low if you do not eat enough carbohydrate or you skip meals  Foods that contain carbohydrate:   · Breads:  Each serving of food listed below contains about 15 g of carbohydrate   ¨ 1 slice of bread (1 ounce) or 1 flour or corn tortilla (6 inch)    ¨ ½ of a hamburger bun or ¼ of a large bagel (about 1 ounce)    ¨ 1 pancake (about 4 inches across and ¼ inch thick)    · Cereals and grains:  Serving sizes of ready-to-eat cereals vary  Look at the serving size and the total carbohydrate amount listed on the food label  Each serving of food listed below contains about 15 g of carbohydrate       ¨ ¾ cup of dry, unsweetened, ready-to-eat cereal or ¼ cup of low-fat granola     ¨ ½ cup of oatmeal or other cooked cereal ¨ ? cup of cooked rice or pasta    · Starchy vegetables and beans:  Each serving of food listed below contains about 15 g of carbohydrate   ¨ ½ cup of corn, green peas, sweet potatoes, or mashed potatoes    ¨ ¼ of a large baked potato    ¨ ½ cup of beans, lentils, and peas (garbanzo, english, kidney, white, split, black-eyed)    · Crackers and snacks:  Each serving of food listed below contains about 15 g of carbohydrate   ¨ 3 dominick cracker squares or 8 animal crackers     ¨ 6 saltine-type crackers    ¨ 3 cups of popcorn or ¾ ounce of pretzels, potato chips, or tortilla chips    · Fruit:  Each serving of food listed below contains about 15 g of carbohydrate   ¨ 1 small (4 ounce) piece of fresh fruit or ¾ to 1 cup of fresh fruit    ¨ ½ cup of canned or frozen fruit, packed in natural juice    ¨ ½ cup (4 ounces) of unsweetened fruit juice    ¨ 2 tablespoons of dried fruit    · Desserts or sugary foods:  Each serving of food listed below contains about 15 g of carbohydrate   ¨ 2-inch square unfrosted cake or brownie     ¨ 2 small cookies    ¨ ½ cup of ice cream, frozen yogurt, or nondairy frozen yogurt    ¨ ¼ cup of sherbet or sorbet    ¨ 1 tablespoon of regular syrup, jam, or jelly    ¨ 2 tablespoons of light syrup    · Milk and yogurt:  Foods from the milk group contain about 12 g of carbohydrate per serving  ¨ 1 cup of fat-free or low-fat milk    ¨ 1 cup of soy milk    ¨ ? cup of fat-free, yogurt sweetened with artificial sweetener    · Non-starchy vegetables:  Each serving contains about 5 g of carbohydrate   Three servings of non-starch vegetables count as 1 carbohydrate serving  ¨ ½ cup of cooked vegetables or 1 cup of raw vegetables  This includes beets, broccoli, cabbage, cauliflower, cucumber, mushrooms, tomatoes, and zucchini    ¨ ½ cup of vegetable juice  How to use carbohydrate counting to plan meals:   · Count carbohydrate amounts using serving sizes:      ¨ Pasta dinner example:   You plan to have pasta, tossed salad, and an 8-ounce glass of milk  Your healthcare provider tells you that you may have 4 carbohydrate servings for dinner  One carbohydrate serving of pasta is ? cup  One cup of pasta will equal 3 carbohydrate servings  An 8-ounce glass of milk will count as 1 carbohydrate serving  These amounts of food would equal 4 carbohydrate servings  One cup of tossed salad does not count toward your carbohydrate servings  · Count carbohydrate amounts using food labels:  Find the total amount of carbohydrate in a packaged food by reading the food label  Food labels tell you the serving size of the food and the total carbohydrate amount in each serving  Find the serving size on the food label and then decide how many servings you will eat  Multiply the number of servings you plan to eat by the carbohydrate amount per serving  ¨ Granola bar snack example: Your meal plan allows you to have 2 carbohydrate servings (30 grams) of carbohydrate for a snack  You plan to eat 1 package of granola bars, which contains 2 bars  According to the food label, the serving size of food in this package is 1 bar  Each serving (1 bar) contains 25 grams of carbohydrate  The total amount of carbohydrate in this package of granola bars would be 50 g  Based on your meal plan, you should eat only 1 bar  Follow up with your healthcare provider as directed:  Write down your questions so you remember to ask them during your visits  © 2017 [address] [employer] Information is for End User's use only and may not be sold, redistributed or otherwise used for commercial purposes  All illustrations and images included in CareNotes® are the copyrighted property of [employer]  or [employer]ha[employer]  The above information is an  only  It is not intended as medical advice for individual conditions or treatments   Talk to your doctor, nurse or pharmacist before following any medical regimen to see if it is safe and effective for you

## 2018-06-06 DIAGNOSIS — I10 HYPERTENSION, UNSPECIFIED TYPE: Primary | ICD-10-CM

## 2018-06-07 RX ORDER — AMLODIPINE BESYLATE 5 MG/1
TABLET ORAL
Qty: 90 TABLET | Refills: 3 | Status: SHIPPED | OUTPATIENT
Start: 2018-06-07 | End: 2019-01-01 | Stop reason: SDUPTHER

## 2018-06-07 RX ORDER — SPIRONOLACTONE 25 MG/1
TABLET ORAL
Qty: 90 TABLET | Refills: 3 | Status: SHIPPED | OUTPATIENT
Start: 2018-06-07 | End: 2019-01-01 | Stop reason: SDUPTHER

## 2018-06-20 LAB
LEFT EYE DIABETIC RETINOPATHY: NORMAL
RIGHT EYE DIABETIC RETINOPATHY: NORMAL

## 2018-08-14 ENCOUNTER — OFFICE VISIT (OUTPATIENT)
Dept: INTERNAL MEDICINE CLINIC | Facility: CLINIC | Age: 83
End: 2018-08-14
Payer: MEDICARE

## 2018-08-14 VITALS
DIASTOLIC BLOOD PRESSURE: 70 MMHG | TEMPERATURE: 98.4 F | HEART RATE: 75 BPM | RESPIRATION RATE: 18 BRPM | BODY MASS INDEX: 29.49 KG/M2 | OXYGEN SATURATION: 91 % | SYSTOLIC BLOOD PRESSURE: 140 MMHG | WEIGHT: 156.2 LBS | HEIGHT: 61 IN

## 2018-08-14 DIAGNOSIS — L65.9 ALOPECIA: ICD-10-CM

## 2018-08-14 DIAGNOSIS — G56.00 CARPAL TUNNEL SYNDROME, UNSPECIFIED LATERALITY: ICD-10-CM

## 2018-08-14 DIAGNOSIS — E11.65 TYPE 2 DIABETES MELLITUS WITH HYPERGLYCEMIA, WITHOUT LONG-TERM CURRENT USE OF INSULIN (HCC): Primary | ICD-10-CM

## 2018-08-14 DIAGNOSIS — E78.5 HYPERLIPIDEMIA, UNSPECIFIED HYPERLIPIDEMIA TYPE: ICD-10-CM

## 2018-08-14 DIAGNOSIS — I10 HYPERTENSION, UNSPECIFIED TYPE: ICD-10-CM

## 2018-08-14 DIAGNOSIS — E83.52 HYPERCALCEMIA: ICD-10-CM

## 2018-08-14 DIAGNOSIS — E55.9 VITAMIN D DEFICIENCY: ICD-10-CM

## 2018-08-14 DIAGNOSIS — E04.1 NONTOXIC SINGLE THYROID NODULE: ICD-10-CM

## 2018-08-14 DIAGNOSIS — I10 HYPERTENSION, UNSPECIFIED TYPE: Primary | ICD-10-CM

## 2018-08-14 PROBLEM — Z23 NEED FOR SHINGLES VACCINE: Status: RESOLVED | Noted: 2018-05-01 | Resolved: 2018-08-14

## 2018-08-14 LAB — SL AMB POCT HEMOGLOBIN AIC: 6.6

## 2018-08-14 PROCEDURE — 99214 OFFICE O/P EST MOD 30 MIN: CPT | Performed by: INTERNAL MEDICINE

## 2018-08-14 PROCEDURE — 83036 HEMOGLOBIN GLYCOSYLATED A1C: CPT | Performed by: INTERNAL MEDICINE

## 2018-08-14 RX ORDER — LABETALOL 200 MG/1
200 TABLET, FILM COATED ORAL 2 TIMES DAILY
Qty: 180 TABLET | Refills: 2 | Status: SHIPPED | OUTPATIENT
Start: 2018-08-14 | End: 2019-01-01 | Stop reason: SDUPTHER

## 2018-08-14 RX ORDER — LABETALOL 200 MG/1
TABLET, FILM COATED ORAL
Qty: 180 TABLET | Refills: 3 | Status: CANCELLED | OUTPATIENT
Start: 2018-08-14

## 2018-08-14 RX ORDER — GLIMEPIRIDE 1 MG/1
TABLET ORAL
Qty: 90 TABLET | Refills: 0 | Status: SHIPPED | OUTPATIENT
Start: 2018-08-14 | End: 2019-01-01 | Stop reason: ALTCHOICE

## 2018-08-14 NOTE — PROGRESS NOTES
Assessment/Plan:refuse surgical intervention for carpal tunnel syndrome      1  Type 2 diabetes A1c is at goal will continue present medications has had no hypoglycemic episodes will recheck in 3 months may consider decreasing or discontinuing glimepiride at that time  2  Hypertension is at goal sure prescription was given for labetalol secondary to a mail order not arriving at  3  Carpal tunnel syndrome no weakness in her extremities patient is very active cuts hair every day as notice no weakness is not interested in surgery will observe to use wrist splints at night         There are no diagnoses linked to this encounter  Scheduled Meds:  Continuous Infusions:  No current facility-administered medications for this visit  PRN Meds:   Scheduled Meds:  Continuous Infusions:  No current facility-administered medications for this visit  Scheduled Meds:    Current Outpatient Prescriptions:     alendronate (FOSAMAX) 70 mg tablet, Take by mouth, Disp: , Rfl:     amLODIPine (NORVASC) 5 mg tablet, TAKE 1 TABLET EVERY DAY, Disp: 90 tablet, Rfl: 3    Cholecalciferol (VITAMIN D3) 2000 units capsule, Take 1 capsule by mouth daily, Disp: , Rfl:     glimepiride (AMARYL) 1 mg tablet, Take 1/2 tablet before evening meal, Disp: 90 tablet, Rfl: 2    labetalol (NORMODYNE) 200 mg tablet, Take 1 tablet (200 mg total) by mouth 2 (two) times a day, Disp: 180 tablet, Rfl: 2    pioglitazone (ACTOS) 15 mg tablet, Take 1 tablet by mouth daily, Disp: , Rfl:     simvastatin (ZOCOR) 20 mg tablet, Take 1 tablet by mouth Daily, Disp: , Rfl:     spironolactone (ALDACTONE) 25 mg tablet, TAKE 1 TABLET EVERY DAY, Disp: 90 tablet, Rfl: 3      The patient was counseled regarding instructions for management, risk factor reductions, patient and family education,impressions, risks and benefits of treatment options, side effects of medications, importance of compliance with treatment   The treatment plan was reviewed with the patient/guardian and patient/guardian understands and agrees with the treatment plan  Subjective:      Patient ID: Kelsey Angela is a 80 y o  female  Carpal tunnel, has numbness no weakness in fingers,        The following portions of the patient's history were reviewed and updated as appropriate:   She has a past medical history of Hematuria ,   does not have any pertinent problems on file  ,   has a past surgical history that includes Hysterectomy; Replacement total knee; and Tonsillectomy  ,  family history includes Cancer in her family; Heart disease in her family  ,   reports that she has never smoked  She has never used smokeless tobacco  She reports that she does not drink alcohol or use drugs  ,  is allergic to benazepril       Review of Systems   Constitutional: Negative for appetite change, chills, fatigue, fever and unexpected weight change  HENT: Negative for congestion, ear pain, facial swelling, hearing loss, mouth sores, nosebleeds, postnasal drip, rhinorrhea, sinus pain, sore throat, trouble swallowing and voice change  Eyes: Negative for pain, discharge, redness and visual disturbance  Respiratory: Negative for apnea, chest tightness, shortness of breath, wheezing and stridor  Cardiovascular: Negative for chest pain, palpitations and leg swelling  Gastrointestinal: Negative for abdominal distention, abdominal pain, blood in stool, constipation, diarrhea and vomiting  Endocrine: Negative for cold intolerance, heat intolerance, polydipsia, polyphagia and polyuria  Genitourinary: Negative for difficulty urinating, dysuria, flank pain, frequency, genital sores, hematuria and urgency  Musculoskeletal: Negative for arthralgias and back pain  Skin: Negative for rash and wound  Allergic/Immunologic: Negative for environmental allergies, food allergies and immunocompromised state  Neurological: Positive for numbness   Negative for dizziness, tremors, seizures, syncope, facial asymmetry, speech difficulty, weakness, light-headedness and headaches  Hematological: Negative for adenopathy  Does not bruise/bleed easily  Psychiatric/Behavioral: Negative for agitation, behavioral problems, dysphoric mood, hallucinations, self-injury, sleep disturbance and suicidal ideas  The patient is not hyperactive  Objective:     Physical Exam   Constitutional: She is oriented to person, place, and time  She appears well-developed  HENT:   Right Ear: External ear normal    Left Ear: External ear normal    Eyes: Right eye exhibits no discharge  Left eye exhibits no discharge  No scleral icterus  Neck: Carotid bruit is not present  No tracheal deviation present  No thyroid mass and no thyromegaly present  Cardiovascular: Normal rate, regular rhythm, normal heart sounds and intact distal pulses  Exam reveals no gallop and no friction rub  No murmur heard  Pulmonary/Chest: No respiratory distress  She has no wheezes  She has no rales  Musculoskeletal: She exhibits no edema  Lymphadenopathy:     She has no cervical adenopathy  Neurological: She is alert and oriented to person, place, and time  Coordination normal    Positive Tinel's and Phalen sign bilaterally no weakness in the lower upper extremities   Psychiatric: She has a normal mood and affect  Her behavior is normal  Judgment and thought content normal    Nursing note and vitals reviewed        Vitals:    08/14/18 1301   Pulse: 75   Resp: 18   Temp: 98 4 °F (36 9 °C)   TempSrc: Tympanic   SpO2: 91%   Weight: 70 9 kg (156 lb 3 2 oz)   Height: 5' 1" (1 549 m)

## 2018-08-14 NOTE — PATIENT INSTRUCTIONS
Diabetes in the Older Adult   WHAT YOU NEED TO KNOW:   What do I need to know if I am an older adult with diabetes? Older adults with diabetes are at risk for heart disease, stroke, kidney disease, blindness, and nerve damage  You may also be at risk for any of the following:  · Poor nutrition or low blood sugar levels    · Confusion or problems with memory, attention, or learning new things    · Trouble controlling urination or frequent urinary tract infections    · Trouble with coordination or balance    · Falls and injuries    · Pain    · Depression    · Open sores on your legs or feet  What are the ABCs of diabetes? The ABCs stand for certain things you can do to manage or prevent problems caused by diabetes:  · A  stands for A1c test   This test shows the average amount of sugar in your blood over the past 2 to 3 months  High levels of sugar in your blood can cause damage to your heart, blood vessels, kidneys, feet, and eyes  Most older adults with diabetes should have an A1c level less than 7 5  Ask your healthcare provider if this A1c goal is right for you  Your provider can help you make changes if your A1c is too high  · B  stands for blood pressure   High blood pressure can increase your risk for a heart attack, stroke, or kidney disease  Most older adults with diabetes should have a systolic blood pressure (first number) of 140  Your diastolic blood pressure (second number) should be below 90  Ask your healthcare provider if these blood pressure goals are right for you  · C  stands for cholesterol   High levels of cholesterol can block your arteries and cause a heart attack or stroke  Ask your healthcare provider what your cholesterol levels should be  · S  stands for stop smoking   Nicotine and other chemicals in cigarettes and cigars can cause lung damage and make it more difficult to manage your diabetes  What can I do to manage the ABCs and prevent problems caused by diabetes?    · Check your blood sugar levels as directed  Your healthcare provider will tell you when and how often to check during the day  Your healthcare provider will also tell you what your blood sugar levels should be before and after a meal  You may need to check for ketones in your urine or blood if your level is higher than directed  Write down your results and show them to your healthcare provider  Your provider may use the results to make changes to your medicine, food, or exercise schedules  Ask your healthcare provider for more information about how to treat a high or low blood sugar level  · Follow your meal plan as directed  A dietitian will help you make a meal plan to keep your blood sugar level steady and make sure you get enough nutrition  Do not skip meals  Your blood sugar level may drop too low if you have taken diabetes medicine and do not eat  Ask your healthcare provider about programs in your community that can deliver the meals to your home  · Try to be active for 30 to 60 minutes most days of the week  Exercise can help keep your blood sugar level steady, decrease your risk of heart disease, and help you lose weight  It can also help improve your balance and decrease your risk for falls  Work with your healthcare provider to create an exercise plan  Ask a family member or friend to exercise with you  Start slow and exercise for 5 to 10 minutes at a time  Examples of activities include walking or swimming  Include muscle strengthening activities 2 to 3 days each week  Include balance training 2 to 3 times each week  Activities that help increase balance include yoga and brent chi      · Maintain a healthy weight  Ask your healthcare provider how much you should weigh  A healthy weight can help you control your diabetes and prevent heart disease  Ask your provider to help you create a weight loss plan if you are overweight  Together you can set manageable weight loss goals  · Do not smoke  Ask your healthcare provider for information if you currently smoke and need help to quit  Do not use e-cigarettes or smokeless tobacco in place of cigarettes or to help you quit  They still contain nicotine  · Manage stress  Stress may increase your blood sugar level  Deep breathing, muscle relaxation, and music may help you relax  Ask your healthcare provider for more information about these practices  What else can I do to manage my diabetes? · Check your feet every day for sores  Look at your whole foot, including the bottom, and between and under your toes  Check for wounds, corns, and calluses  Use a mirror to see the bottom of your feet  The skin on your feet may be shiny, tight, dry, or darker than normal  Your feet may also be cold and pale  Feel your feet by running your hands along the tops, bottoms, sides, and between your toes  Redness, swelling, and warmth are signs of blood flow problems that can lead to a foot ulcer  Do not try to remove corns or calluses yourself  · Wear medical alert identification  Wear medical alert jewelry or carry a card that says you have diabetes  Ask your healthcare provider where to get these items  · Ask about vaccines  You have a higher risk for serious illness if you get the flu, pneumonia, or hepatitis  Ask your healthcare provider if you should get a flu, pneumonia, shingles, or hepatitis B vaccine, and when to get the vaccine  · Keep all appointments  You may need to return to have your A1c checked every 3 months  You will need to return at least once each year to have your feet checked  You will need an eye exam once a year to check for retinopathy  You will also need urine tests every year to check for kidney problems  You may need tests to monitor for heart disease  Write down your questions so you remember to ask them during your visits  · Get help from family and friends    You may need help checking your blood sugar level, giving insulin injections, or preparing your meals  Ask your family and friends to help you with these tasks  Talk to your healthcare provider if you do not have someone at home to help you  A healthcare provider can come to your home to help you with these tasks  Call 911 for any of the following:   · You have any of the following signs of a stroke:      ¨ Numbness or drooping on one side of your face     ¨ Weakness in an arm or leg    ¨ Confusion or difficulty speaking    ¨ Dizziness, a severe headache, or vision loss    · You have any of the following signs of a heart attack:      ¨ Squeezing, pressure, or pain in your chest that lasts longer than 5 minutes or returns    ¨ Discomfort or pain in your back, neck, jaw, stomach, or arm     ¨ Trouble breathing    ¨ Nausea or vomiting    ¨ Lightheadedness or a sudden cold sweat, especially with chest pain or trouble breathing  When should I seek immediate care? · You have severe abdominal pain, or the pain spreads to your back  You may also be vomiting  · You have trouble staying awake or focusing  · You are shaking or sweating  · You have blurred or double vision  · Your breath has a fruity, sweet smell  · Your breathing is deep and labored, or rapid and shallow  · Your heartbeat is fast and weak  · You fall and get hurt  When should I contact my healthcare provider? · You are vomiting or have diarrhea  · You have an upset stomach and cannot eat the foods on your meal plan  · You feel weak or more tired than usual      · You feel dizzy, have headaches, or are easily irritated  · Your skin is red, warm, dry, or swollen  · You have a wound that does not heal      · You have numbness in your arms or legs  · You have trouble coping with your illness, or you feel anxious or depressed  · You have problems with your memory  · You have changes in your vision       · You have questions or concerns about your condition or care  CARE AGREEMENT:   You have the right to help plan your care  Learn about your health condition and how it may be treated  Discuss treatment options with your caregivers to decide what care you want to receive  You always have the right to refuse treatment  The above information is an  only  It is not intended as medical advice for individual conditions or treatments  Talk to your doctor, nurse or pharmacist before following any medical regimen to see if it is safe and effective for you  © 2017 [employer]  Information is for End User's use only and may not be sold, redistributed or otherwise used for commercial purposes  All illustrations and images included in CareNotes® are the copyrighted property of [employer]  or [employer]  Carpal Tunnel Surgery   AMBULATORY CARE:   What you need to know about carpal tunnel surgery:  Carpal tunnel surgery, or decompression, is used to take pressure off the median nerve in your wrist  The median nerve controls muscles and feeling in the hand  Surgery may be done through an opening on your palm  This is called open surgery  Your surgeon may instead put a scope and tools into 1 or 2 small incisions on your wrist or palm  This is called endoscopic surgery  How to prepare for surgery: Your healthcare provider will tell you how to prepare for surgery  He may tell you not to eat or drink anything after midnight on the day of your surgery  He will tell you which medicines to take or not take on the day of surgery  Arrange to have someone drive you home after surgery  You may get antibiotics before surgery to prevent an infection  Tell your healthcare provider if you have ever had an allergic reaction to antibiotics  What will happen during surgery:   · You may be given local or regional anesthesia to help prevent pain during surgery  Local anesthesia will make only your wrist numb   Regional anesthesia will make your wrist, hand, and arm numb  You may instead be given general anesthesia to keep you asleep and free from pain  You may need this anesthesia if your surgeon thinks surgery will take a long time or involve a large part of your wrist      · For open surgery, your surgeon will make an incision on the palm of your hand  The incision may extend to your wrist  A ligament will be cut to release the pressure on the nerve  This ligament is a band of tissue that connects joints in your wrist  Your surgeon may also remove scar tissue or anything else that may be pressing on the nerve  · For endoscopic surgery, your surgeon will make 1 or 2 incisions on your wrist or palm  He will insert the endoscope with the camera through an incision to help guide him during surgery  Tools may be put in your wrist to help protect the nerves  He will then cut the ligament that is pressing on the nerve  · The incision will be closed with stitches and covered with bandages  What will happen after surgery:  A splint may be placed on your wrist to keep it from moving  You will be taken to a room where you will rest until you are fully awake and gain feeling in your arm  Your healthcare provider may ask you to move your fingers soon after your surgery  Do not try to get out of bed until your healthcare provider says it is okay  Risks of carpal tunnel surgery: You may bleed more than expected or get an infection  Your skin may bruise  A thick, painful scar may form where you had surgery  You may develop trigger finger (fingers locked in a bent position)  Surgery may cause long-term numbness or weakness in your fingers, hand, or wrist  Your symptoms may not go away, and you may need surgery again  Seek care immediately if:   · Your stitches come apart  · Blood soaks through your bandage  · You cannot feel or move your hand or fingers      · You feel a lump or swelling in your wrist   Contact your healthcare provider or hand specialist if: · You have a fever or chills  · You feel weak or achy  · You have pain, even after you take medicine  · You have swelling, stiffness, or numbness in your fingers  · Your finger becomes stuck in the same position  · You have questions or concerns about your condition or care  Medicines: You may need any of the following:  · Antibiotics  help prevent or fight a bacterial infection  · Prescription pain medicine  may be given  Ask how to take this medicine safely  · Take your medicine as directed  Contact your healthcare provider if you think your medicine is not helping or if you have side effects  Tell him or her if you are allergic to any medicine  Keep a list of the medicines, vitamins, and herbs you take  Include the amounts, and when and why you take them  Bring the list or the pill bottles to follow-up visits  Carry your medicine list with you in case of an emergency  Go to physical or occupational therapy, if directed:  A physical therapist can teach you exercises to help improve movement and strength  Physical therapy can also help decrease pain and loss of function  An occupational therapist can help you find ways to do work and other activities to reduce strain on your wrist   Apply ice to your wrist:  Ice helps decrease swelling and pain  Ice may also help prevent tissue damage  Use an ice pack or put crushed ice in a plastic bag  Cover the ice pack or bag with a towel  Place it on your wrist for 15 to 20 minutes every hour, or as directed  Limit activity as directed:  Do not pull, lift, or move heavy objects until your healthcare provider says it is okay  Ask when you can return to work  Take time to rest your hand  If you work on a computer, rest your hand often  You may need to elevate your arm several times a day  This helps decrease pain and swelling  Follow up with your healthcare provider or hand specialist as directed:   You may need to return to have your stitches removed  Ask how long you need to wear your splint  Write down your questions so you remember to ask them during your visits  © 2017 [employer] [employer] Information is for End User's use only and may not be sold, redistributed or otherwise used for commercial purposes  All illustrations and images included in CareNotes® are the copyrighted property of [employer]  or [employer]  The above information is an  only  It is not intended as medical advice for individual conditions or treatments  Talk to your doctor, nurse or pharmacist before following any medical regimen to see if it is safe and effective for you

## 2018-10-24 DIAGNOSIS — E11.65 TYPE 2 DIABETES MELLITUS WITH HYPERGLYCEMIA, WITHOUT LONG-TERM CURRENT USE OF INSULIN (HCC): Primary | ICD-10-CM

## 2018-10-25 RX ORDER — PIOGLITAZONEHYDROCHLORIDE 15 MG/1
TABLET ORAL
Qty: 90 TABLET | Refills: 2 | Status: SHIPPED | OUTPATIENT
Start: 2018-10-25 | End: 2019-01-01 | Stop reason: ALTCHOICE

## 2018-11-13 ENCOUNTER — IMMUNIZATION (OUTPATIENT)
Dept: INTERNAL MEDICINE CLINIC | Facility: CLINIC | Age: 83
End: 2018-11-13

## 2018-11-13 VITALS — TEMPERATURE: 97.8 F

## 2018-11-13 DIAGNOSIS — Z23 ENCOUNTER FOR IMMUNIZATION: ICD-10-CM

## 2018-11-13 PROCEDURE — G0008 ADMIN INFLUENZA VIRUS VAC: HCPCS

## 2018-11-13 PROCEDURE — 90662 IIV NO PRSV INCREASED AG IM: CPT

## 2018-11-20 DIAGNOSIS — E78.5 HYPERLIPIDEMIA, UNSPECIFIED HYPERLIPIDEMIA TYPE: ICD-10-CM

## 2018-11-20 DIAGNOSIS — E11.65 TYPE 2 DIABETES MELLITUS WITH HYPERGLYCEMIA, WITHOUT LONG-TERM CURRENT USE OF INSULIN (HCC): Primary | ICD-10-CM

## 2018-11-20 RX ORDER — SIMVASTATIN 20 MG
TABLET ORAL
Qty: 90 TABLET | Refills: 3 | Status: SHIPPED | OUTPATIENT
Start: 2018-11-20 | End: 2019-01-01 | Stop reason: SDUPTHER

## 2019-01-01 ENCOUNTER — APPOINTMENT (EMERGENCY)
Dept: RADIOLOGY | Facility: HOSPITAL | Age: 84
DRG: 208 | End: 2019-01-01
Payer: MEDICARE

## 2019-01-01 ENCOUNTER — ANESTHESIA (INPATIENT)
Dept: RADIOLOGY | Facility: HOSPITAL | Age: 84
DRG: 208 | End: 2019-01-01
Payer: MEDICARE

## 2019-01-01 ENCOUNTER — APPOINTMENT (INPATIENT)
Dept: CT IMAGING | Facility: HOSPITAL | Age: 84
DRG: 208 | End: 2019-01-01
Payer: MEDICARE

## 2019-01-01 ENCOUNTER — TRANSCRIBE ORDERS (OUTPATIENT)
Dept: LAB | Facility: CLINIC | Age: 84
End: 2019-01-01

## 2019-01-01 ENCOUNTER — OFFICE VISIT (OUTPATIENT)
Dept: INTERNAL MEDICINE CLINIC | Facility: CLINIC | Age: 84
End: 2019-01-01
Payer: MEDICARE

## 2019-01-01 ENCOUNTER — HOSPITAL ENCOUNTER (OUTPATIENT)
Dept: RADIOLOGY | Facility: HOSPITAL | Age: 84
Discharge: HOME | End: 2019-02-20
Attending: INTERNAL MEDICINE
Payer: MEDICARE

## 2019-01-01 ENCOUNTER — APPOINTMENT (INPATIENT)
Dept: NON INVASIVE DIAGNOSTICS | Facility: HOSPITAL | Age: 84
DRG: 208 | End: 2019-01-01
Payer: MEDICARE

## 2019-01-01 ENCOUNTER — HOSPITAL ENCOUNTER (INPATIENT)
Facility: HOSPITAL | Age: 84
LOS: 1 days | DRG: 208 | End: 2019-12-08
Attending: ANESTHESIOLOGY | Admitting: INTERNAL MEDICINE
Payer: MEDICARE

## 2019-01-01 ENCOUNTER — TELEPHONE (OUTPATIENT)
Dept: INTERNAL MEDICINE CLINIC | Facility: CLINIC | Age: 84
End: 2019-01-01

## 2019-01-01 ENCOUNTER — APPOINTMENT (OUTPATIENT)
Dept: LAB | Facility: HOSPITAL | Age: 84
End: 2019-01-01
Payer: MEDICARE

## 2019-01-01 ENCOUNTER — APPOINTMENT (INPATIENT)
Dept: RADIOLOGY | Facility: HOSPITAL | Age: 84
DRG: 208 | End: 2019-01-01
Payer: MEDICARE

## 2019-01-01 ENCOUNTER — HOSPITAL ENCOUNTER (INPATIENT)
Facility: HOSPITAL | Age: 84
LOS: 4 days | DRG: 208 | End: 2019-12-08
Attending: EMERGENCY MEDICINE | Admitting: INTERNAL MEDICINE
Payer: MEDICARE

## 2019-01-01 ENCOUNTER — APPOINTMENT (OUTPATIENT)
Dept: LAB | Facility: HOSPITAL | Age: 84
End: 2019-01-01
Attending: INTERNAL MEDICINE
Payer: MEDICARE

## 2019-01-01 ENCOUNTER — APPOINTMENT (INPATIENT)
Dept: RADIOLOGY | Facility: HOSPITAL | Age: 84
DRG: 208 | End: 2019-01-01
Attending: RADIOLOGY
Payer: MEDICARE

## 2019-01-01 ENCOUNTER — APPOINTMENT (OUTPATIENT)
Dept: LAB | Facility: CLINIC | Age: 84
End: 2019-01-01
Payer: MEDICARE

## 2019-01-01 ENCOUNTER — APPOINTMENT (EMERGENCY)
Dept: CT IMAGING | Facility: HOSPITAL | Age: 84
DRG: 208 | End: 2019-01-01
Payer: MEDICARE

## 2019-01-01 ENCOUNTER — ANESTHESIA EVENT (INPATIENT)
Dept: RADIOLOGY | Facility: HOSPITAL | Age: 84
DRG: 208 | End: 2019-01-01
Payer: MEDICARE

## 2019-01-01 VITALS
RESPIRATION RATE: 18 BRPM | HEART RATE: 78 BPM | BODY MASS INDEX: 29.42 KG/M2 | HEIGHT: 61 IN | DIASTOLIC BLOOD PRESSURE: 68 MMHG | SYSTOLIC BLOOD PRESSURE: 138 MMHG | OXYGEN SATURATION: 93 % | TEMPERATURE: 98.9 F | WEIGHT: 155.8 LBS

## 2019-01-01 VITALS
BODY MASS INDEX: 28.7 KG/M2 | RESPIRATION RATE: 16 BRPM | HEART RATE: 84 BPM | TEMPERATURE: 99.1 F | WEIGHT: 152 LBS | SYSTOLIC BLOOD PRESSURE: 120 MMHG | OXYGEN SATURATION: 96 % | HEIGHT: 61 IN | DIASTOLIC BLOOD PRESSURE: 62 MMHG

## 2019-01-01 VITALS
HEART RATE: 80 BPM | WEIGHT: 149.91 LBS | BODY MASS INDEX: 27.59 KG/M2 | HEIGHT: 62 IN | OXYGEN SATURATION: 99 % | DIASTOLIC BLOOD PRESSURE: 61 MMHG | RESPIRATION RATE: 27 BRPM | SYSTOLIC BLOOD PRESSURE: 111 MMHG | TEMPERATURE: 99.7 F

## 2019-01-01 VITALS
TEMPERATURE: 98.5 F | OXYGEN SATURATION: 96 % | DIASTOLIC BLOOD PRESSURE: 68 MMHG | HEIGHT: 61 IN | BODY MASS INDEX: 28.85 KG/M2 | HEART RATE: 67 BPM | SYSTOLIC BLOOD PRESSURE: 142 MMHG | WEIGHT: 152.8 LBS | RESPIRATION RATE: 16 BRPM

## 2019-01-01 VITALS
SYSTOLIC BLOOD PRESSURE: 135 MMHG | RESPIRATION RATE: 16 BRPM | BODY MASS INDEX: 28.7 KG/M2 | WEIGHT: 152 LBS | DIASTOLIC BLOOD PRESSURE: 70 MMHG | OXYGEN SATURATION: 96 % | HEART RATE: 101 BPM | HEIGHT: 61 IN | TEMPERATURE: 99.2 F

## 2019-01-01 VITALS
SYSTOLIC BLOOD PRESSURE: 101 MMHG | TEMPERATURE: 102.2 F | DIASTOLIC BLOOD PRESSURE: 59 MMHG | RESPIRATION RATE: 24 BRPM | OXYGEN SATURATION: 98 % | HEART RATE: 78 BPM

## 2019-01-01 DIAGNOSIS — I10 HYPERTENSION, UNSPECIFIED TYPE: ICD-10-CM

## 2019-01-01 DIAGNOSIS — G56.00 CARPAL TUNNEL SYNDROME, UNSPECIFIED LATERALITY: ICD-10-CM

## 2019-01-01 DIAGNOSIS — Z00.00 HEALTHCARE MAINTENANCE: ICD-10-CM

## 2019-01-01 DIAGNOSIS — M81.0 OSTEOPOROSIS, UNSPECIFIED OSTEOPOROSIS TYPE, UNSPECIFIED PATHOLOGICAL FRACTURE PRESENCE: ICD-10-CM

## 2019-01-01 DIAGNOSIS — E11.65 TYPE 2 DIABETES MELLITUS WITH HYPERGLYCEMIA, WITHOUT LONG-TERM CURRENT USE OF INSULIN (HCC): ICD-10-CM

## 2019-01-01 DIAGNOSIS — E78.5 HYPERLIPIDEMIA, UNSPECIFIED HYPERLIPIDEMIA TYPE: ICD-10-CM

## 2019-01-01 DIAGNOSIS — R13.11 ORAL PHASE DYSPHAGIA: Primary | ICD-10-CM

## 2019-01-01 DIAGNOSIS — R06.02 SOB (SHORTNESS OF BREATH): Primary | ICD-10-CM

## 2019-01-01 DIAGNOSIS — R53.83 FATIGUE, UNSPECIFIED TYPE: ICD-10-CM

## 2019-01-01 DIAGNOSIS — M81.0 OSTEOPOROSIS, UNSPECIFIED OSTEOPOROSIS TYPE, UNSPECIFIED PATHOLOGICAL FRACTURE PRESENCE: Primary | ICD-10-CM

## 2019-01-01 DIAGNOSIS — D75.1 POLYCYTHEMIA: Primary | ICD-10-CM

## 2019-01-01 DIAGNOSIS — M75.01 ADHESIVE CAPSULITIS OF RIGHT SHOULDER: ICD-10-CM

## 2019-01-01 DIAGNOSIS — E83.52 HYPERCALCEMIA: ICD-10-CM

## 2019-01-01 DIAGNOSIS — H11.32 NON-TRAUMATIC SUBCONJUNCTIVAL HEMORRHAGE OF LEFT EYE: ICD-10-CM

## 2019-01-01 DIAGNOSIS — E11.65 TYPE 2 DIABETES MELLITUS WITH HYPERGLYCEMIA, WITHOUT LONG-TERM CURRENT USE OF INSULIN (HCC): Primary | ICD-10-CM

## 2019-01-01 DIAGNOSIS — M54.12 RIGHT CERVICAL RADICULOPATHY: ICD-10-CM

## 2019-01-01 DIAGNOSIS — R77.8 ELEVATED TROPONIN: ICD-10-CM

## 2019-01-01 DIAGNOSIS — R09.2 RESPIRATORY ARREST (HCC): ICD-10-CM

## 2019-01-01 DIAGNOSIS — R09.02 HYPOXIA: ICD-10-CM

## 2019-01-01 DIAGNOSIS — E55.9 VITAMIN D DEFICIENCY: ICD-10-CM

## 2019-01-01 DIAGNOSIS — J18.9 PNEUMONIA: Primary | ICD-10-CM

## 2019-01-01 DIAGNOSIS — I10 ESSENTIAL HYPERTENSION: Primary | ICD-10-CM

## 2019-01-01 LAB
25(OH)D3 SERPL-MCNC: 35.4 NG/ML (ref 30–100)
ABO GROUP BLD: NORMAL
ALBUMIN SERPL BCP-MCNC: 2.5 G/DL (ref 3.5–5)
ALBUMIN SERPL BCP-MCNC: 2.6 G/DL (ref 3.5–5)
ALBUMIN SERPL BCP-MCNC: 3.3 G/DL (ref 3.5–5)
ALBUMIN SERPL BCP-MCNC: 3.4 G/DL (ref 3.5–5)
ALBUMIN SERPL BCP-MCNC: 3.5 G/DL (ref 3.5–5)
ALP SERPL-CCNC: 122 U/L (ref 46–116)
ALP SERPL-CCNC: 125 U/L (ref 46–116)
ALP SERPL-CCNC: 148 U/L (ref 46–116)
ALP SERPL-CCNC: 153 U/L (ref 46–116)
ALP SERPL-CCNC: 159 U/L (ref 46–116)
ALT SERPL W P-5'-P-CCNC: 21 U/L (ref 12–78)
ALT SERPL W P-5'-P-CCNC: 24 U/L (ref 12–78)
ALT SERPL W P-5'-P-CCNC: 25 U/L (ref 12–78)
ALT SERPL W P-5'-P-CCNC: 65 U/L (ref 12–78)
ALT SERPL W P-5'-P-CCNC: 74 U/L (ref 12–78)
ANION GAP SERPL CALCULATED.3IONS-SCNC: 10 MMOL/L (ref 4–13)
ANION GAP SERPL CALCULATED.3IONS-SCNC: 13 MMOL/L (ref 4–13)
ANION GAP SERPL CALCULATED.3IONS-SCNC: 14 MMOL/L (ref 4–13)
ANION GAP SERPL CALCULATED.3IONS-SCNC: 16 MMOL/L (ref 4–13)
ANION GAP SERPL CALCULATED.3IONS-SCNC: 7 MMOL/L (ref 4–13)
ANION GAP SERPL CALCULATED.3IONS-SCNC: 9 MMOL/L (ref 4–13)
APTT PPP: 151 SECONDS (ref 23–37)
APTT PPP: 30 SECONDS (ref 23–37)
APTT PPP: 37 SECONDS (ref 26–38)
ARTERIAL PATENCY WRIST A: YES
AST SERPL W P-5'-P-CCNC: 15 U/L (ref 5–45)
AST SERPL W P-5'-P-CCNC: 16 U/L (ref 5–45)
AST SERPL W P-5'-P-CCNC: 21 U/L (ref 5–45)
AST SERPL W P-5'-P-CCNC: 37 U/L (ref 5–45)
AST SERPL W P-5'-P-CCNC: 89 U/L (ref 5–45)
ATRIAL RATE: 75 BPM
ATRIAL RATE: 80 BPM
B BURGDOR IGG SER IA-ACNC: 0.19
B BURGDOR IGM SER IA-ACNC: 0.29
B PARAP IS1001 DNA NPH QL NAA+NON-PROBE: NOT DETECTED
B PERT.PT PRMT NPH QL NAA+NON-PROBE: NOT DETECTED
BASE EXCESS BLDA CALC-SCNC: -3.6 MMOL/L
BASE EXCESS BLDA CALC-SCNC: -7.6 MMOL/L
BASOPHILS # BLD AUTO: 0.03 THOUSANDS/ΜL (ref 0–0.1)
BASOPHILS # BLD AUTO: 0.04 THOUSANDS/ΜL (ref 0–0.1)
BASOPHILS # BLD AUTO: 0.07 THOUSANDS/ΜL (ref 0–0.1)
BASOPHILS NFR BLD AUTO: 0 % (ref 0–1)
BASOPHILS NFR BLD AUTO: 1 % (ref 0–1)
BASOPHILS NFR BLD AUTO: 1 % (ref 0–1)
BILIRUB SERPL-MCNC: 0.66 MG/DL (ref 0.2–1)
BILIRUB SERPL-MCNC: 0.67 MG/DL (ref 0.2–1)
BILIRUB SERPL-MCNC: 0.7 MG/DL (ref 0.2–1)
BILIRUB SERPL-MCNC: 0.7 MG/DL (ref 0.2–1)
BILIRUB SERPL-MCNC: 1 MG/DL (ref 0.2–1)
BLD GP AB SCN SERPL QL: NEGATIVE
BUN SERPL-MCNC: 23 MG/DL (ref 5–25)
BUN SERPL-MCNC: 23 MG/DL (ref 5–25)
BUN SERPL-MCNC: 33 MG/DL (ref 5–25)
BUN SERPL-MCNC: 33 MG/DL (ref 5–25)
BUN SERPL-MCNC: 35 MG/DL (ref 5–25)
BUN SERPL-MCNC: 38 MG/DL (ref 5–25)
BUN SERPL-MCNC: 40 MG/DL (ref 5–25)
BUN SERPL-MCNC: 45 MG/DL (ref 5–25)
C PNEUM DNA NPH QL NAA+NON-PROBE: NOT DETECTED
CA-I BLD-SCNC: 1.21 MMOL/L (ref 1.12–1.32)
CA-I BLD-SCNC: 1.22 MMOL/L (ref 1.12–1.32)
CA-I BLD-SCNC: 1.31 MMOL/L (ref 1.12–1.32)
CALCIUM ALBUM COR SERPL-MCNC: 10.8 MG/DL (ref 8.3–10.1)
CALCIUM SERPL-MCNC: 10.2 MG/DL (ref 8.3–10.1)
CALCIUM SERPL-MCNC: 10.7 MG/DL (ref 8.3–10.1)
CALCIUM SERPL-MCNC: 8.7 MG/DL (ref 8.3–10.1)
CALCIUM SERPL-MCNC: 8.9 MG/DL (ref 8.3–10.1)
CALCIUM SERPL-MCNC: 9.3 MG/DL (ref 8.3–10.1)
CALCIUM SERPL-MCNC: 9.4 MG/DL (ref 8.3–10.1)
CALCIUM SERPL-MCNC: 9.5 MG/DL (ref 8.3–10.1)
CALCIUM SERPL-MCNC: 9.5 MG/DL (ref 8.3–10.1)
CHLORIDE SERPL-SCNC: 101 MMOL/L (ref 100–108)
CHLORIDE SERPL-SCNC: 103 MMOL/L (ref 100–108)
CHLORIDE SERPL-SCNC: 104 MMOL/L (ref 100–108)
CHLORIDE SERPL-SCNC: 104 MMOL/L (ref 100–108)
CHLORIDE SERPL-SCNC: 105 MMOL/L (ref 100–108)
CHLORIDE SERPL-SCNC: 106 MMOL/L (ref 100–108)
CHLORIDE SERPL-SCNC: 106 MMOL/L (ref 100–108)
CHLORIDE SERPL-SCNC: 110 MMOL/L (ref 100–108)
CHOLEST SERPL-MCNC: 162 MG/DL (ref 50–200)
CO2 SERPL-SCNC: 19 MMOL/L (ref 21–32)
CO2 SERPL-SCNC: 21 MMOL/L (ref 21–32)
CO2 SERPL-SCNC: 23 MMOL/L (ref 21–32)
CO2 SERPL-SCNC: 24 MMOL/L (ref 21–32)
CO2 SERPL-SCNC: 24 MMOL/L (ref 21–32)
CO2 SERPL-SCNC: 28 MMOL/L (ref 21–32)
CO2 SERPL-SCNC: 28 MMOL/L (ref 21–32)
CO2 SERPL-SCNC: 29 MMOL/L (ref 21–32)
CREAT SERPL-MCNC: 0.92 MG/DL (ref 0.6–1.3)
CREAT SERPL-MCNC: 0.98 MG/DL (ref 0.6–1.3)
CREAT SERPL-MCNC: 1.02 MG/DL (ref 0.6–1.3)
CREAT SERPL-MCNC: 1.04 MG/DL (ref 0.6–1.3)
CREAT SERPL-MCNC: 1.07 MG/DL (ref 0.6–1.3)
CREAT SERPL-MCNC: 1.13 MG/DL (ref 0.6–1.3)
CREAT SERPL-MCNC: 1.19 MG/DL (ref 0.6–1.3)
CREAT SERPL-MCNC: 1.23 MG/DL (ref 0.6–1.3)
EOSINOPHIL # BLD AUTO: 0.02 THOUSAND/ΜL (ref 0–0.61)
EOSINOPHIL # BLD AUTO: 0.02 THOUSAND/ΜL (ref 0–0.61)
EOSINOPHIL # BLD AUTO: 0.16 THOUSAND/ΜL (ref 0–0.61)
EOSINOPHIL # BLD AUTO: 0.19 THOUSAND/ΜL (ref 0–0.61)
EOSINOPHIL # BLD AUTO: 0.31 THOUSAND/ΜL (ref 0–0.61)
EOSINOPHIL NFR BLD AUTO: 0 % (ref 0–6)
EOSINOPHIL NFR BLD AUTO: 0 % (ref 0–6)
EOSINOPHIL NFR BLD AUTO: 1 % (ref 0–6)
EOSINOPHIL NFR BLD AUTO: 3 % (ref 0–6)
EOSINOPHIL NFR BLD AUTO: 4 % (ref 0–6)
ERYTHROCYTE [DISTWIDTH] IN BLOOD BY AUTOMATED COUNT: 12.5 % (ref 11.6–15.1)
ERYTHROCYTE [DISTWIDTH] IN BLOOD BY AUTOMATED COUNT: 12.7 % (ref 11.6–15.1)
ERYTHROCYTE [DISTWIDTH] IN BLOOD BY AUTOMATED COUNT: 12.8 % (ref 11.6–15.1)
ERYTHROCYTE [DISTWIDTH] IN BLOOD BY AUTOMATED COUNT: 12.9 % (ref 11.6–15.1)
ERYTHROCYTE [DISTWIDTH] IN BLOOD BY AUTOMATED COUNT: 12.9 % (ref 11.6–15.1)
ERYTHROCYTE [DISTWIDTH] IN BLOOD BY AUTOMATED COUNT: 13.2 % (ref 11.6–15.1)
ERYTHROCYTE [DISTWIDTH] IN BLOOD BY AUTOMATED COUNT: 13.3 % (ref 11.6–15.1)
EST. AVERAGE GLUCOSE BLD GHB EST-MCNC: 143 MG/DL
EST. AVERAGE GLUCOSE BLD GHB EST-MCNC: 154 MG/DL
FIBRINOGEN PPP-MCNC: 465 MG/DL (ref 227–495)
FLUAV RNA NPH QL NAA+NON-PROBE: NOT DETECTED
FLUAV RNA NPH QL NAA+PROBE: NORMAL
FLUBV RNA NPH QL NAA+NON-PROBE: NOT DETECTED
FLUBV RNA NPH QL NAA+PROBE: NORMAL
GFR SERPL CREATININE-BSD FRML MDRD: 38 ML/MIN/1.73SQ M
GFR SERPL CREATININE-BSD FRML MDRD: 40 ML/MIN/1.73SQ M
GFR SERPL CREATININE-BSD FRML MDRD: 43 ML/MIN/1.73SQ M
GFR SERPL CREATININE-BSD FRML MDRD: 45 ML/MIN/1.73SQ M
GFR SERPL CREATININE-BSD FRML MDRD: 47 ML/MIN/1.73SQ M
GFR SERPL CREATININE-BSD FRML MDRD: 48 ML/MIN/1.73SQ M
GFR SERPL CREATININE-BSD FRML MDRD: 51 ML/MIN/1.73SQ M
GFR SERPL CREATININE-BSD FRML MDRD: 55 ML/MIN/1.73SQ M
GLUCOSE P FAST SERPL-MCNC: 164 MG/DL (ref 65–99)
GLUCOSE P FAST SERPL-MCNC: 180 MG/DL (ref 65–99)
GLUCOSE SERPL-MCNC: 139 MG/DL (ref 65–140)
GLUCOSE SERPL-MCNC: 164 MG/DL (ref 65–140)
GLUCOSE SERPL-MCNC: 169 MG/DL (ref 65–140)
GLUCOSE SERPL-MCNC: 171 MG/DL (ref 65–140)
GLUCOSE SERPL-MCNC: 174 MG/DL (ref 65–140)
GLUCOSE SERPL-MCNC: 175 MG/DL (ref 65–140)
GLUCOSE SERPL-MCNC: 187 MG/DL (ref 65–140)
GLUCOSE SERPL-MCNC: 194 MG/DL (ref 65–140)
GLUCOSE SERPL-MCNC: 197 MG/DL (ref 65–140)
GLUCOSE SERPL-MCNC: 197 MG/DL (ref 65–140)
GLUCOSE SERPL-MCNC: 200 MG/DL (ref 65–140)
GLUCOSE SERPL-MCNC: 204 MG/DL (ref 65–140)
GLUCOSE SERPL-MCNC: 212 MG/DL (ref 65–140)
GLUCOSE SERPL-MCNC: 215 MG/DL (ref 65–140)
GLUCOSE SERPL-MCNC: 220 MG/DL (ref 65–140)
GLUCOSE SERPL-MCNC: 220 MG/DL (ref 65–140)
GLUCOSE SERPL-MCNC: 221 MG/DL (ref 65–140)
GLUCOSE SERPL-MCNC: 238 MG/DL (ref 65–140)
GLUCOSE SERPL-MCNC: 248 MG/DL (ref 65–140)
GLUCOSE SERPL-MCNC: 263 MG/DL (ref 65–140)
GLUCOSE SERPL-MCNC: 269 MG/DL (ref 65–140)
HADV DNA NPH QL NAA+NON-PROBE: NOT DETECTED
HBA1C MFR BLD: 6.6 % (ref 4.2–6.3)
HBA1C MFR BLD: 7 % (ref 4.2–6.3)
HCO3 BLDA-SCNC: 17.2 MMOL/L (ref 22–28)
HCO3 BLDA-SCNC: 19.9 MMOL/L (ref 22–28)
HCT VFR BLD AUTO: 40 % (ref 34.8–46.1)
HCT VFR BLD AUTO: 42.3 % (ref 34.8–46.1)
HCT VFR BLD AUTO: 42.5 % (ref 34.8–46.1)
HCT VFR BLD AUTO: 43.6 % (ref 34.8–46.1)
HCT VFR BLD AUTO: 43.9 % (ref 34.8–46.1)
HCT VFR BLD AUTO: 46.3 % (ref 34.8–46.1)
HCT VFR BLD AUTO: 47.5 % (ref 34.8–46.1)
HCT VFR BLD AUTO: 47.6 % (ref 34.8–46.1)
HCT VFR BLD AUTO: 48.7 % (ref 34.8–46.1)
HDLC SERPL-MCNC: 48 MG/DL (ref 40–60)
HGB BLD-MCNC: 12.7 G/DL (ref 11.5–15.4)
HGB BLD-MCNC: 13.6 G/DL (ref 11.5–15.4)
HGB BLD-MCNC: 14 G/DL (ref 11.5–15.4)
HGB BLD-MCNC: 14.1 G/DL (ref 11.5–15.4)
HGB BLD-MCNC: 14.3 G/DL (ref 11.5–15.4)
HGB BLD-MCNC: 14.8 G/DL (ref 11.5–15.4)
HGB BLD-MCNC: 15.2 G/DL (ref 11.5–15.4)
HGB BLD-MCNC: 15.7 G/DL (ref 11.5–15.4)
HGB BLD-MCNC: 15.7 G/DL (ref 11.5–15.4)
HMPV RNA NPH QL NAA+NON-PROBE: NOT DETECTED
HOROWITZ INDEX BLDA+IHG-RTO: 100 MM[HG]
HOROWITZ INDEX BLDA+IHG-RTO: 50 MM[HG]
HPIV 1+2+3+4 RNA NPH QL NAA+NON-PROBE: NOT DETECTED
HPIV 1+2+3+4 RNA NPH QL NAA+NON-PROBE: NOT DETECTED
IMM GRANULOCYTES # BLD AUTO: 0.02 THOUSAND/UL (ref 0–0.2)
IMM GRANULOCYTES # BLD AUTO: 0.03 THOUSAND/UL (ref 0–0.2)
IMM GRANULOCYTES # BLD AUTO: 0.03 THOUSAND/UL (ref 0–0.2)
IMM GRANULOCYTES # BLD AUTO: 0.06 THOUSAND/UL (ref 0–0.2)
IMM GRANULOCYTES # BLD AUTO: 0.08 THOUSAND/UL (ref 0–0.2)
IMM GRANULOCYTES NFR BLD AUTO: 0 % (ref 0–2)
IMM GRANULOCYTES NFR BLD AUTO: 1 % (ref 0–2)
IMM GRANULOCYTES NFR BLD AUTO: 1 % (ref 0–2)
INR PPP: 1.09 (ref 0.86–1.17)
INR PPP: 1.22 (ref 0.84–1.19)
INR PPP: 1.48 (ref 0.84–1.19)
L PNEUMO1 AG UR QL IA.RAPID: NEGATIVE
LACTATE SERPL-SCNC: 1.7 MMOL/L (ref 0.5–2)
LACTATE SERPL-SCNC: 1.8 MMOL/L (ref 0.5–2)
LACTATE SERPL-SCNC: 2.6 MMOL/L (ref 0.5–2)
LDLC SERPL CALC-MCNC: 90 MG/DL (ref 0–100)
LYMPHOCYTES # BLD AUTO: 0.95 THOUSANDS/ΜL (ref 0.6–4.47)
LYMPHOCYTES # BLD AUTO: 1.07 THOUSANDS/ΜL (ref 0.6–4.47)
LYMPHOCYTES # BLD AUTO: 1.22 THOUSANDS/ΜL (ref 0.6–4.47)
LYMPHOCYTES # BLD AUTO: 1.25 THOUSANDS/ΜL (ref 0.6–4.47)
LYMPHOCYTES # BLD AUTO: 1.49 THOUSANDS/ΜL (ref 0.6–4.47)
LYMPHOCYTES NFR BLD AUTO: 10 % (ref 14–44)
LYMPHOCYTES NFR BLD AUTO: 11 % (ref 14–44)
LYMPHOCYTES NFR BLD AUTO: 14 % (ref 14–44)
LYMPHOCYTES NFR BLD AUTO: 18 % (ref 14–44)
LYMPHOCYTES NFR BLD AUTO: 9 % (ref 14–44)
M PNEUMO DNA NPH QL NAA+NON-PROBE: NOT DETECTED
MAGNESIUM SERPL-MCNC: 2 MG/DL (ref 1.6–2.6)
MAGNESIUM SERPL-MCNC: 2.1 MG/DL (ref 1.6–2.6)
MAGNESIUM SERPL-MCNC: 2.4 MG/DL (ref 1.6–2.6)
MCH RBC QN AUTO: 28.7 PG (ref 26.8–34.3)
MCH RBC QN AUTO: 28.9 PG (ref 26.8–34.3)
MCH RBC QN AUTO: 29 PG (ref 26.8–34.3)
MCH RBC QN AUTO: 29 PG (ref 26.8–34.3)
MCH RBC QN AUTO: 29.2 PG (ref 26.8–34.3)
MCH RBC QN AUTO: 29.2 PG (ref 26.8–34.3)
MCH RBC QN AUTO: 29.4 PG (ref 26.8–34.3)
MCH RBC QN AUTO: 29.6 PG (ref 26.8–34.3)
MCH RBC QN AUTO: 30 PG (ref 26.8–34.3)
MCHC RBC AUTO-ENTMCNC: 31.8 G/DL (ref 31.4–37.4)
MCHC RBC AUTO-ENTMCNC: 32 G/DL (ref 31.4–37.4)
MCHC RBC AUTO-ENTMCNC: 32 G/DL (ref 31.4–37.4)
MCHC RBC AUTO-ENTMCNC: 32.2 G/DL (ref 31.4–37.4)
MCHC RBC AUTO-ENTMCNC: 32.2 G/DL (ref 31.4–37.4)
MCHC RBC AUTO-ENTMCNC: 32.3 G/DL (ref 31.4–37.4)
MCHC RBC AUTO-ENTMCNC: 32.6 G/DL (ref 31.4–37.4)
MCHC RBC AUTO-ENTMCNC: 32.9 G/DL (ref 31.4–37.4)
MCHC RBC AUTO-ENTMCNC: 33 G/DL (ref 31.4–37.4)
MCV RBC AUTO: 89 FL (ref 82–98)
MCV RBC AUTO: 90 FL (ref 82–98)
MCV RBC AUTO: 90 FL (ref 82–98)
MCV RBC AUTO: 91 FL (ref 82–98)
MCV RBC AUTO: 91 FL (ref 82–98)
MCV RBC AUTO: 92 FL (ref 82–98)
MCV RBC AUTO: 94 FL (ref 82–98)
MONOCYTES # BLD AUTO: 0.5 THOUSAND/ΜL (ref 0.17–1.22)
MONOCYTES # BLD AUTO: 0.51 THOUSAND/ΜL (ref 0.17–1.22)
MONOCYTES # BLD AUTO: 0.65 THOUSAND/ΜL (ref 0.17–1.22)
MONOCYTES # BLD AUTO: 0.86 THOUSAND/ΜL (ref 0.17–1.22)
MONOCYTES # BLD AUTO: 1.07 THOUSAND/ΜL (ref 0.17–1.22)
MONOCYTES NFR BLD AUTO: 5 % (ref 4–12)
MONOCYTES NFR BLD AUTO: 7 % (ref 4–12)
MONOCYTES NFR BLD AUTO: 7 % (ref 4–12)
MONOCYTES NFR BLD AUTO: 8 % (ref 4–12)
MONOCYTES NFR BLD AUTO: 9 % (ref 4–12)
NEUTROPHILS # BLD AUTO: 10.41 THOUSANDS/ΜL (ref 1.85–7.62)
NEUTROPHILS # BLD AUTO: 11.82 THOUSANDS/ΜL (ref 1.85–7.62)
NEUTROPHILS # BLD AUTO: 4.87 THOUSANDS/ΜL (ref 1.85–7.62)
NEUTROPHILS # BLD AUTO: 5.64 THOUSANDS/ΜL (ref 1.85–7.62)
NEUTROPHILS # BLD AUTO: 7.91 THOUSANDS/ΜL (ref 1.85–7.62)
NEUTS SEG NFR BLD AUTO: 70 % (ref 43–75)
NEUTS SEG NFR BLD AUTO: 73 % (ref 43–75)
NEUTS SEG NFR BLD AUTO: 80 % (ref 43–75)
NEUTS SEG NFR BLD AUTO: 82 % (ref 43–75)
NEUTS SEG NFR BLD AUTO: 85 % (ref 43–75)
NONHDLC SERPL-MCNC: 114 MG/DL
NRBC BLD AUTO-RTO: 0 /100 WBCS
NT-PROBNP SERPL-MCNC: 6660 PG/ML
O2 CT BLDA-SCNC: 17.5 ML/DL (ref 16–23)
O2 CT BLDA-SCNC: 19.3 ML/DL (ref 16–23)
OXYHGB MFR BLDA: 92.9 % (ref 94–97)
OXYHGB MFR BLDA: 98.2 % (ref 94–97)
P AXIS: 48 DEGREES
P AXIS: 70 DEGREES
PCO2 BLDA: 31.6 MM HG (ref 36–44)
PCO2 BLDA: 32.9 MM HG (ref 36–44)
PEEP RESPIRATORY: 5 CM[H2O]
PEEP RESPIRATORY: 8 CM[H2O]
PH BLDA: 7.33 [PH] (ref 7.35–7.45)
PH BLDA: 7.42 [PH] (ref 7.35–7.45)
PHOSPHATE SERPL-MCNC: 3.3 MG/DL (ref 2.3–4.1)
PHOSPHATE SERPL-MCNC: 3.4 MG/DL (ref 2.3–4.1)
PHOSPHATE SERPL-MCNC: 4.1 MG/DL (ref 2.3–4.1)
PLATELET # BLD AUTO: 224 THOUSANDS/UL (ref 149–390)
PLATELET # BLD AUTO: 234 THOUSANDS/UL (ref 149–390)
PLATELET # BLD AUTO: 251 THOUSANDS/UL (ref 149–390)
PLATELET # BLD AUTO: 263 THOUSANDS/UL (ref 149–390)
PLATELET # BLD AUTO: 269 THOUSANDS/UL (ref 149–390)
PLATELET # BLD AUTO: 285 THOUSANDS/UL (ref 149–390)
PLATELET # BLD AUTO: 290 THOUSANDS/UL (ref 149–390)
PLATELET # BLD AUTO: 327 THOUSANDS/UL (ref 149–390)
PLATELET # BLD AUTO: 334 THOUSANDS/UL (ref 149–390)
PLATELET # BLD AUTO: 370 THOUSANDS/UL (ref 149–390)
PMV BLD AUTO: 10.7 FL (ref 8.9–12.7)
PMV BLD AUTO: 10.7 FL (ref 8.9–12.7)
PMV BLD AUTO: 10.8 FL (ref 8.9–12.7)
PMV BLD AUTO: 10.8 FL (ref 8.9–12.7)
PMV BLD AUTO: 9.6 FL (ref 8.9–12.7)
PMV BLD AUTO: 9.7 FL (ref 8.9–12.7)
PMV BLD AUTO: 9.8 FL (ref 8.9–12.7)
PMV BLD AUTO: 9.9 FL (ref 8.9–12.7)
PO2 BLDA: 151.4 MM HG (ref 75–129)
PO2 BLDA: 76.4 MM HG (ref 75–129)
POTASSIUM SERPL-SCNC: 4.1 MMOL/L (ref 3.5–5.3)
POTASSIUM SERPL-SCNC: 4.1 MMOL/L (ref 3.5–5.3)
POTASSIUM SERPL-SCNC: 4.3 MMOL/L (ref 3.5–5.3)
POTASSIUM SERPL-SCNC: 4.5 MMOL/L (ref 3.5–5.3)
POTASSIUM SERPL-SCNC: 4.6 MMOL/L (ref 3.5–5.3)
POTASSIUM SERPL-SCNC: 5.1 MMOL/L (ref 3.5–5.3)
PR INTERVAL: 166 MS
PR INTERVAL: 190 MS
PROCALCITONIN SERPL-MCNC: <0.05 NG/ML
PROT SERPL-MCNC: 6.3 G/DL (ref 6.4–8.2)
PROT SERPL-MCNC: 6.5 G/DL (ref 6.4–8.2)
PROT SERPL-MCNC: 7.1 G/DL (ref 6.4–8.2)
PROT SERPL-MCNC: 7.3 G/DL (ref 6.4–8.2)
PROT SERPL-MCNC: 7.8 G/DL (ref 6.4–8.2)
PROTHROMBIN TIME: 14 SECONDS (ref 11.8–14.2)
PROTHROMBIN TIME: 15.5 SECONDS (ref 11.6–14.5)
PROTHROMBIN TIME: 17.5 SECONDS (ref 11.6–14.5)
PTH-INTACT SERPL-MCNC: 92.5 PG/ML (ref 18.4–80.1)
QRS AXIS: 59 DEGREES
QRS AXIS: 60 DEGREES
QRSD INTERVAL: 78 MS
QRSD INTERVAL: 86 MS
QT INTERVAL: 412 MS
QT INTERVAL: 428 MS
QTC INTERVAL: 475 MS
QTC INTERVAL: 477 MS
RBC # BLD AUTO: 4.42 MILLION/UL (ref 3.81–5.12)
RBC # BLD AUTO: 4.69 MILLION/UL (ref 3.81–5.12)
RBC # BLD AUTO: 4.77 MILLION/UL (ref 3.81–5.12)
RBC # BLD AUTO: 4.88 MILLION/UL (ref 3.81–5.12)
RBC # BLD AUTO: 4.89 MILLION/UL (ref 3.81–5.12)
RBC # BLD AUTO: 4.94 MILLION/UL (ref 3.81–5.12)
RBC # BLD AUTO: 5.25 MILLION/UL (ref 3.81–5.12)
RBC # BLD AUTO: 5.3 MILLION/UL (ref 3.81–5.12)
RBC # BLD AUTO: 5.37 MILLION/UL (ref 3.81–5.12)
RH BLD: POSITIVE
RSV RNA NPH QL NAA+NON-PROBE: NOT DETECTED
RSV RNA NPH QL NAA+PROBE: NORMAL
RV+EV RNA NPH QL NAA+NON-PROBE: NOT DETECTED
S PNEUM AG UR QL: NEGATIVE
SL AMB POCT HEMOGLOBIN AIC: 6.6 (ref ?–6.5)
SL AMB POCT HEMOGLOBIN AIC: 6.7 (ref ?–6.5)
SODIUM SERPL-SCNC: 138 MMOL/L (ref 136–145)
SODIUM SERPL-SCNC: 138 MMOL/L (ref 136–145)
SODIUM SERPL-SCNC: 139 MMOL/L (ref 136–145)
SODIUM SERPL-SCNC: 139 MMOL/L (ref 136–145)
SODIUM SERPL-SCNC: 140 MMOL/L (ref 136–145)
SODIUM SERPL-SCNC: 141 MMOL/L (ref 136–145)
SODIUM SERPL-SCNC: 141 MMOL/L (ref 136–145)
SODIUM SERPL-SCNC: 142 MMOL/L (ref 136–145)
SPECIMEN EXPIRATION DATE: NORMAL
SPECIMEN SOURCE: ABNORMAL
SPECIMEN SOURCE: ABNORMAL
T WAVE AXIS: 51 DEGREES
T WAVE AXIS: 85 DEGREES
TRIGL SERPL-MCNC: 118 MG/DL
TROPONIN I SERPL-MCNC: 0.25 NG/ML
TROPONIN I SERPL-MCNC: 0.29 NG/ML
TROPONIN I SERPL-MCNC: 0.31 NG/ML
TROPONIN I SERPL-MCNC: 0.42 NG/ML
TROPONIN I SERPL-MCNC: 0.42 NG/ML
VENT AC: 17
VENT AC: 18
VENT- AC: AC
VENT- AC: AC
VENTRICULAR RATE: 75 BPM
VENTRICULAR RATE: 80 BPM
VT SETTING VENT: 360 ML
VT SETTING VENT: 360 ML
WBC # BLD AUTO: 12.5 THOUSAND/UL (ref 4.31–10.16)
WBC # BLD AUTO: 13.02 THOUSAND/UL (ref 4.31–10.16)
WBC # BLD AUTO: 14.24 THOUSAND/UL (ref 4.31–10.16)
WBC # BLD AUTO: 14.48 THOUSAND/UL (ref 4.31–10.16)
WBC # BLD AUTO: 7.04 THOUSAND/UL (ref 4.31–10.16)
WBC # BLD AUTO: 7.61 THOUSAND/UL (ref 4.31–10.16)
WBC # BLD AUTO: 9.2 THOUSAND/UL (ref 4.31–10.16)
WBC # BLD AUTO: 9.45 THOUSAND/UL (ref 4.31–10.16)
WBC # BLD AUTO: 9.46 THOUSAND/UL (ref 4.31–10.16)

## 2019-01-01 PROCEDURE — 94002 VENT MGMT INPAT INIT DAY: CPT

## 2019-01-01 PROCEDURE — 76937 US GUIDE VASCULAR ACCESS: CPT | Performed by: RADIOLOGY

## 2019-01-01 PROCEDURE — 82785 ASSAY OF IGE: CPT | Performed by: PHYSICIAN ASSISTANT

## 2019-01-01 PROCEDURE — 82948 REAGENT STRIP/BLOOD GLUCOSE: CPT

## 2019-01-01 PROCEDURE — 37212 THROMBOLYTIC VENOUS THERAPY: CPT | Performed by: RADIOLOGY

## 2019-01-01 PROCEDURE — 83880 ASSAY OF NATRIURETIC PEPTIDE: CPT | Performed by: EMERGENCY MEDICINE

## 2019-01-01 PROCEDURE — C1887 CATHETER, GUIDING: HCPCS

## 2019-01-01 PROCEDURE — 99232 SBSQ HOSP IP/OBS MODERATE 35: CPT | Performed by: PHYSICIAN ASSISTANT

## 2019-01-01 PROCEDURE — 87486 CHLMYD PNEUM DNA AMP PROBE: CPT | Performed by: PHYSICIAN ASSISTANT

## 2019-01-01 PROCEDURE — 80053 COMPREHEN METABOLIC PANEL: CPT | Performed by: EMERGENCY MEDICINE

## 2019-01-01 PROCEDURE — 94760 N-INVAS EAR/PLS OXIMETRY 1: CPT

## 2019-01-01 PROCEDURE — 93306 TTE W/DOPPLER COMPLETE: CPT | Performed by: INTERNAL MEDICINE

## 2019-01-01 PROCEDURE — 87449 NOS EACH ORGANISM AG IA: CPT | Performed by: NURSE PRACTITIONER

## 2019-01-01 PROCEDURE — 82803 BLOOD GASES ANY COMBINATION: CPT

## 2019-01-01 PROCEDURE — 85384 FIBRINOGEN ACTIVITY: CPT | Performed by: RADIOLOGY

## 2019-01-01 PROCEDURE — 97163 PT EVAL HIGH COMPLEX 45 MIN: CPT

## 2019-01-01 PROCEDURE — 80053 COMPREHEN METABOLIC PANEL: CPT

## 2019-01-01 PROCEDURE — 80061 LIPID PANEL: CPT

## 2019-01-01 PROCEDURE — 84145 PROCALCITONIN (PCT): CPT | Performed by: NURSE PRACTITIONER

## 2019-01-01 PROCEDURE — 85025 COMPLETE CBC W/AUTO DIFF WBC: CPT | Performed by: PHYSICIAN ASSISTANT

## 2019-01-01 PROCEDURE — 80053 COMPREHEN METABOLIC PANEL: CPT | Performed by: PHYSICIAN ASSISTANT

## 2019-01-01 PROCEDURE — 90662 IIV NO PRSV INCREASED AG IM: CPT | Performed by: INTERNAL MEDICINE

## 2019-01-01 PROCEDURE — 84100 ASSAY OF PHOSPHORUS: CPT | Performed by: PHYSICIAN ASSISTANT

## 2019-01-01 PROCEDURE — 80048 BASIC METABOLIC PNL TOTAL CA: CPT | Performed by: NURSE PRACTITIONER

## 2019-01-01 PROCEDURE — 94640 AIRWAY INHALATION TREATMENT: CPT

## 2019-01-01 PROCEDURE — 99285 EMERGENCY DEPT VISIT HI MDM: CPT | Performed by: EMERGENCY MEDICINE

## 2019-01-01 PROCEDURE — G8988 SELF CARE GOAL STATUS: HCPCS

## 2019-01-01 PROCEDURE — G0008 ADMIN INFLUENZA VIRUS VAC: HCPCS | Performed by: INTERNAL MEDICINE

## 2019-01-01 PROCEDURE — 99024 POSTOP FOLLOW-UP VISIT: CPT | Performed by: RADIOLOGY

## 2019-01-01 PROCEDURE — 86901 BLOOD TYPING SEROLOGIC RH(D): CPT | Performed by: PHYSICIAN ASSISTANT

## 2019-01-01 PROCEDURE — G0439 PPPS, SUBSEQ VISIT: HCPCS | Performed by: NURSE PRACTITIONER

## 2019-01-01 PROCEDURE — 85027 COMPLETE CBC AUTOMATED: CPT | Performed by: PHYSICIAN ASSISTANT

## 2019-01-01 PROCEDURE — G8978 MOBILITY CURRENT STATUS: HCPCS

## 2019-01-01 PROCEDURE — 99223 1ST HOSP IP/OBS HIGH 75: CPT | Performed by: INTERNAL MEDICINE

## 2019-01-01 PROCEDURE — 97530 THERAPEUTIC ACTIVITIES: CPT

## 2019-01-01 PROCEDURE — 85014 HEMATOCRIT: CPT

## 2019-01-01 PROCEDURE — 71046 X-RAY EXAM CHEST 2 VIEWS: CPT

## 2019-01-01 PROCEDURE — 84484 ASSAY OF TROPONIN QUANT: CPT | Performed by: INTERNAL MEDICINE

## 2019-01-01 PROCEDURE — 99291 CRITICAL CARE FIRST HOUR: CPT | Performed by: PHYSICIAN ASSISTANT

## 2019-01-01 PROCEDURE — 77001 FLUOROGUIDE FOR VEIN DEVICE: CPT | Performed by: RADIOLOGY

## 2019-01-01 PROCEDURE — 83036 HEMOGLOBIN GLYCOSYLATED A1C: CPT | Performed by: INTERNAL MEDICINE

## 2019-01-01 PROCEDURE — 82306 VITAMIN D 25 HYDROXY: CPT

## 2019-01-01 PROCEDURE — NC001 PR NO CHARGE: Performed by: NURSE PRACTITIONER

## 2019-01-01 PROCEDURE — B31SYZZ FLUOROSCOPY OF RIGHT PULMONARY ARTERY USING OTHER CONTRAST: ICD-10-PCS | Performed by: RADIOLOGY

## 2019-01-01 PROCEDURE — 93005 ELECTROCARDIOGRAM TRACING: CPT

## 2019-01-01 PROCEDURE — 82947 ASSAY GLUCOSE BLOOD QUANT: CPT

## 2019-01-01 PROCEDURE — 87633 RESP VIRUS 12-25 TARGETS: CPT | Performed by: PHYSICIAN ASSISTANT

## 2019-01-01 PROCEDURE — 72050 X-RAY EXAM NECK SPINE 4/5VWS: CPT

## 2019-01-01 PROCEDURE — 73030 X-RAY EXAM OF SHOULDER: CPT

## 2019-01-01 PROCEDURE — 5A1935Z RESPIRATORY VENTILATION, LESS THAN 24 CONSECUTIVE HOURS: ICD-10-PCS | Performed by: ANESTHESIOLOGY

## 2019-01-01 PROCEDURE — 85610 PROTHROMBIN TIME: CPT | Performed by: PHYSICIAN ASSISTANT

## 2019-01-01 PROCEDURE — 86003 ALLG SPEC IGE CRUDE XTRC EA: CPT | Performed by: PHYSICIAN ASSISTANT

## 2019-01-01 PROCEDURE — G8987 SELF CARE CURRENT STATUS: HCPCS

## 2019-01-01 PROCEDURE — 36415 COLL VENOUS BLD VENIPUNCTURE: CPT

## 2019-01-01 PROCEDURE — 36556 INSERT NON-TUNNEL CV CATH: CPT | Performed by: RADIOLOGY

## 2019-01-01 PROCEDURE — C1757 CATH, THROMBECTOMY/EMBOLECT: HCPCS

## 2019-01-01 PROCEDURE — 99214 OFFICE O/P EST MOD 30 MIN: CPT | Performed by: NURSE PRACTITIONER

## 2019-01-01 PROCEDURE — 93010 ELECTROCARDIOGRAM REPORT: CPT | Performed by: INTERNAL MEDICINE

## 2019-01-01 PROCEDURE — C1894 INTRO/SHEATH, NON-LASER: HCPCS

## 2019-01-01 PROCEDURE — 36415 COLL VENOUS BLD VENIPUNCTURE: CPT | Performed by: EMERGENCY MEDICINE

## 2019-01-01 PROCEDURE — C1769 GUIDE WIRE: HCPCS

## 2019-01-01 PROCEDURE — 86900 BLOOD TYPING SEROLOGIC ABO: CPT | Performed by: PHYSICIAN ASSISTANT

## 2019-01-01 PROCEDURE — 97110 THERAPEUTIC EXERCISES: CPT

## 2019-01-01 PROCEDURE — NC001 PR NO CHARGE: Performed by: PHYSICIAN ASSISTANT

## 2019-01-01 PROCEDURE — 36014 PLACE CATHETER IN ARTERY: CPT | Performed by: RADIOLOGY

## 2019-01-01 PROCEDURE — 36555 INSERT NON-TUNNEL CV CATH: CPT

## 2019-01-01 PROCEDURE — 94664 DEMO&/EVAL PT USE INHALER: CPT

## 2019-01-01 PROCEDURE — 83735 ASSAY OF MAGNESIUM: CPT | Performed by: PHYSICIAN ASSISTANT

## 2019-01-01 PROCEDURE — 84295 ASSAY OF SERUM SODIUM: CPT

## 2019-01-01 PROCEDURE — 84132 ASSAY OF SERUM POTASSIUM: CPT

## 2019-01-01 PROCEDURE — 84100 ASSAY OF PHOSPHORUS: CPT | Performed by: INTERNAL MEDICINE

## 2019-01-01 PROCEDURE — 85730 THROMBOPLASTIN TIME PARTIAL: CPT | Performed by: PHYSICIAN ASSISTANT

## 2019-01-01 PROCEDURE — 76937 US GUIDE VASCULAR ACCESS: CPT

## 2019-01-01 PROCEDURE — 83605 ASSAY OF LACTIC ACID: CPT | Performed by: PHYSICIAN ASSISTANT

## 2019-01-01 PROCEDURE — 85025 COMPLETE CBC W/AUTO DIFF WBC: CPT | Performed by: EMERGENCY MEDICINE

## 2019-01-01 PROCEDURE — 97116 GAIT TRAINING THERAPY: CPT

## 2019-01-01 PROCEDURE — 83036 HEMOGLOBIN GLYCOSYLATED A1C: CPT | Performed by: NURSE PRACTITIONER

## 2019-01-01 PROCEDURE — 03HY32Z INSERTION OF MONITORING DEVICE INTO UPPER ARTERY, PERCUTANEOUS APPROACH: ICD-10-PCS | Performed by: ANESTHESIOLOGY

## 2019-01-01 PROCEDURE — 84484 ASSAY OF TROPONIN QUANT: CPT | Performed by: EMERGENCY MEDICINE

## 2019-01-01 PROCEDURE — 99232 SBSQ HOSP IP/OBS MODERATE 35: CPT | Performed by: NURSE PRACTITIONER

## 2019-01-01 PROCEDURE — 80048 BASIC METABOLIC PNL TOTAL CA: CPT | Performed by: INTERNAL MEDICINE

## 2019-01-01 PROCEDURE — 84145 PROCALCITONIN (PCT): CPT | Performed by: INTERNAL MEDICINE

## 2019-01-01 PROCEDURE — 85347 COAGULATION TIME ACTIVATED: CPT

## 2019-01-01 PROCEDURE — 83970 ASSAY OF PARATHORMONE: CPT

## 2019-01-01 PROCEDURE — 85025 COMPLETE CBC W/AUTO DIFF WBC: CPT

## 2019-01-01 PROCEDURE — C9113 INJ PANTOPRAZOLE SODIUM, VIA: HCPCS | Performed by: PHYSICIAN ASSISTANT

## 2019-01-01 PROCEDURE — 99214 OFFICE O/P EST MOD 30 MIN: CPT | Performed by: INTERNAL MEDICINE

## 2019-01-01 PROCEDURE — NC001 PR NO CHARGE: Performed by: EMERGENCY MEDICINE

## 2019-01-01 PROCEDURE — 31500 INSERT EMERGENCY AIRWAY: CPT | Performed by: NURSE PRACTITIONER

## 2019-01-01 PROCEDURE — 87798 DETECT AGENT NOS DNA AMP: CPT | Performed by: PHYSICIAN ASSISTANT

## 2019-01-01 PROCEDURE — 83036 HEMOGLOBIN GLYCOSYLATED A1C: CPT

## 2019-01-01 PROCEDURE — 4A133J1 MONITORING OF ARTERIAL PULSE, PERIPHERAL, PERCUTANEOUS APPROACH: ICD-10-PCS | Performed by: ANESTHESIOLOGY

## 2019-01-01 PROCEDURE — 97167 OT EVAL HIGH COMPLEX 60 MIN: CPT

## 2019-01-01 PROCEDURE — 77001 FLUOROGUIDE FOR VEIN DEVICE: CPT

## 2019-01-01 PROCEDURE — B518YZZ FLUOROSCOPY OF SUPERIOR VENA CAVA USING OTHER CONTRAST: ICD-10-PCS | Performed by: RADIOLOGY

## 2019-01-01 PROCEDURE — 82805 BLOOD GASES W/O2 SATURATION: CPT | Performed by: PHYSICIAN ASSISTANT

## 2019-01-01 PROCEDURE — 82330 ASSAY OF CALCIUM: CPT | Performed by: PHYSICIAN ASSISTANT

## 2019-01-01 PROCEDURE — 80048 BASIC METABOLIC PNL TOTAL CA: CPT | Performed by: PHYSICIAN ASSISTANT

## 2019-01-01 PROCEDURE — 99285 EMERGENCY DEPT VISIT HI MDM: CPT

## 2019-01-01 PROCEDURE — 70450 CT HEAD/BRAIN W/O DYE: CPT

## 2019-01-01 PROCEDURE — 83735 ASSAY OF MAGNESIUM: CPT | Performed by: INTERNAL MEDICINE

## 2019-01-01 PROCEDURE — 92950 HEART/LUNG RESUSCITATION CPR: CPT

## 2019-01-01 PROCEDURE — 85730 THROMBOPLASTIN TIME PARTIAL: CPT

## 2019-01-01 PROCEDURE — G8979 MOBILITY GOAL STATUS: HCPCS

## 2019-01-01 PROCEDURE — 85027 COMPLETE CBC AUTOMATED: CPT | Performed by: INTERNAL MEDICINE

## 2019-01-01 PROCEDURE — 93306 TTE W/DOPPLER COMPLETE: CPT

## 2019-01-01 PROCEDURE — 86850 RBC ANTIBODY SCREEN: CPT | Performed by: PHYSICIAN ASSISTANT

## 2019-01-01 PROCEDURE — 85049 AUTOMATED PLATELET COUNT: CPT | Performed by: NURSE PRACTITIONER

## 2019-01-01 PROCEDURE — 4A033B3 MEASUREMENT OF ARTERIAL PRESSURE, PULMONARY, PERCUTANEOUS APPROACH: ICD-10-PCS | Performed by: RADIOLOGY

## 2019-01-01 PROCEDURE — 99291 CRITICAL CARE FIRST HOUR: CPT | Performed by: INTERNAL MEDICINE

## 2019-01-01 PROCEDURE — 87631 RESP VIRUS 3-5 TARGETS: CPT | Performed by: EMERGENCY MEDICINE

## 2019-01-01 PROCEDURE — 4A133B1 MONITORING OF ARTERIAL PRESSURE, PERIPHERAL, PERCUTANEOUS APPROACH: ICD-10-PCS | Performed by: ANESTHESIOLOGY

## 2019-01-01 PROCEDURE — 82330 ASSAY OF CALCIUM: CPT

## 2019-01-01 PROCEDURE — 75743 ARTERY X-RAYS LUNGS: CPT

## 2019-01-01 PROCEDURE — 02HV33Z INSERTION OF INFUSION DEVICE INTO SUPERIOR VENA CAVA, PERCUTANEOUS APPROACH: ICD-10-PCS | Performed by: RADIOLOGY

## 2019-01-01 PROCEDURE — 36600 WITHDRAWAL OF ARTERIAL BLOOD: CPT

## 2019-01-01 PROCEDURE — 99292 CRITICAL CARE ADDL 30 MIN: CPT | Performed by: ANESTHESIOLOGY

## 2019-01-01 PROCEDURE — 85027 COMPLETE CBC AUTOMATED: CPT | Performed by: NURSE PRACTITIONER

## 2019-01-01 PROCEDURE — 86618 LYME DISEASE ANTIBODY: CPT

## 2019-01-01 PROCEDURE — 87581 M.PNEUMON DNA AMP PROBE: CPT | Performed by: PHYSICIAN ASSISTANT

## 2019-01-01 PROCEDURE — 71045 X-RAY EXAM CHEST 1 VIEW: CPT

## 2019-01-01 PROCEDURE — 71275 CT ANGIOGRAPHY CHEST: CPT

## 2019-01-01 PROCEDURE — 0BH18EZ INSERTION OF ENDOTRACHEAL AIRWAY INTO TRACHEA, VIA NATURAL OR ARTIFICIAL OPENING ENDOSCOPIC: ICD-10-PCS | Performed by: ANESTHESIOLOGY

## 2019-01-01 PROCEDURE — 71250 CT THORAX DX C-: CPT

## 2019-01-01 PROCEDURE — 85610 PROTHROMBIN TIME: CPT

## 2019-01-01 RX ORDER — DOCUSATE SODIUM 100 MG/1
100 CAPSULE, LIQUID FILLED ORAL 2 TIMES DAILY
Status: DISCONTINUED | OUTPATIENT
Start: 2019-01-01 | End: 2019-01-01 | Stop reason: HOSPADM

## 2019-01-01 RX ORDER — PANTOPRAZOLE SODIUM 40 MG/1
40 INJECTION, POWDER, FOR SOLUTION INTRAVENOUS
Status: CANCELLED | OUTPATIENT
Start: 2019-12-09

## 2019-01-01 RX ORDER — PRAVASTATIN SODIUM 40 MG
40 TABLET ORAL
Status: CANCELLED | OUTPATIENT
Start: 2019-01-01

## 2019-01-01 RX ORDER — LABETALOL 200 MG/1
200 TABLET, FILM COATED ORAL 2 TIMES DAILY
Status: DISCONTINUED | OUTPATIENT
Start: 2019-01-01 | End: 2019-01-01

## 2019-01-01 RX ORDER — ACETAMINOPHEN 325 MG/1
650 TABLET ORAL EVERY 6 HOURS PRN
Status: DISCONTINUED | OUTPATIENT
Start: 2019-01-01 | End: 2019-01-01 | Stop reason: HOSPADM

## 2019-01-01 RX ORDER — SPIRONOLACTONE 25 MG/1
12.5 TABLET ORAL DAILY
Status: DISCONTINUED | OUTPATIENT
Start: 2019-01-01 | End: 2019-01-01

## 2019-01-01 RX ORDER — PRAVASTATIN SODIUM 40 MG
40 TABLET ORAL
Status: DISCONTINUED | OUTPATIENT
Start: 2019-01-01 | End: 2019-01-01 | Stop reason: HOSPADM

## 2019-01-01 RX ORDER — LABETALOL 200 MG/1
200 TABLET, FILM COATED ORAL 2 TIMES DAILY
Qty: 180 TABLET | Refills: 1 | Status: SHIPPED | OUTPATIENT
Start: 2019-01-01 | End: 2019-01-01 | Stop reason: HOSPADM

## 2019-01-01 RX ORDER — FENTANYL CITRATE 50 UG/ML
50 INJECTION, SOLUTION INTRAMUSCULAR; INTRAVENOUS EVERY 2 HOUR PRN
Status: DISCONTINUED | OUTPATIENT
Start: 2019-01-01 | End: 2019-01-01 | Stop reason: HOSPADM

## 2019-01-01 RX ORDER — ALBUTEROL SULFATE 2.5 MG/3ML
2.5 SOLUTION RESPIRATORY (INHALATION) EVERY 4 HOURS PRN
Status: DISCONTINUED | OUTPATIENT
Start: 2019-01-01 | End: 2019-01-01 | Stop reason: HOSPADM

## 2019-01-01 RX ORDER — SODIUM CHLORIDE, SODIUM GLUCONATE, SODIUM ACETATE, POTASSIUM CHLORIDE, MAGNESIUM CHLORIDE, SODIUM PHOSPHATE, DIBASIC, AND POTASSIUM PHOSPHATE .53; .5; .37; .037; .03; .012; .00082 G/100ML; G/100ML; G/100ML; G/100ML; G/100ML; G/100ML; G/100ML
75 INJECTION, SOLUTION INTRAVENOUS CONTINUOUS
Status: DISCONTINUED | OUTPATIENT
Start: 2019-01-01 | End: 2019-01-01 | Stop reason: HOSPADM

## 2019-01-01 RX ORDER — EPINEPHRINE 1 MG/ML
INJECTION, SOLUTION, CONCENTRATE INTRAVENOUS AS NEEDED
Status: DISCONTINUED | OUTPATIENT
Start: 2019-01-01 | End: 2019-01-01 | Stop reason: SURG

## 2019-01-01 RX ORDER — SODIUM CHLORIDE 9 MG/ML
35 INJECTION, SOLUTION INTRAVENOUS CONTINUOUS
Status: DISCONTINUED | OUTPATIENT
Start: 2019-01-01 | End: 2019-01-01 | Stop reason: HOSPADM

## 2019-01-01 RX ORDER — HEPARIN SODIUM 10000 [USP'U]/100ML
3-30 INJECTION, SOLUTION INTRAVENOUS
Status: DISCONTINUED | OUTPATIENT
Start: 2019-01-01 | End: 2019-01-01 | Stop reason: HOSPADM

## 2019-01-01 RX ORDER — FENTANYL CITRATE 50 UG/ML
50 INJECTION, SOLUTION INTRAMUSCULAR; INTRAVENOUS EVERY 2 HOUR PRN
Status: CANCELLED | OUTPATIENT
Start: 2019-01-01

## 2019-01-01 RX ORDER — HEPARIN SODIUM 10000 [USP'U]/100ML
300-2000 INJECTION, SOLUTION INTRAVENOUS
Status: CANCELLED | OUTPATIENT
Start: 2019-01-01

## 2019-01-01 RX ORDER — ROCURONIUM BROMIDE 10 MG/ML
INJECTION, SOLUTION INTRAVENOUS AS NEEDED
Status: DISCONTINUED | OUTPATIENT
Start: 2019-01-01 | End: 2019-01-01 | Stop reason: SURG

## 2019-01-01 RX ORDER — AZITHROMYCIN 250 MG/1
500 TABLET, FILM COATED ORAL EVERY 24 HOURS
Status: DISCONTINUED | OUTPATIENT
Start: 2019-01-01 | End: 2019-01-01

## 2019-01-01 RX ORDER — HEPARIN SODIUM 10000 [USP'U]/100ML
3-30 INJECTION, SOLUTION INTRAVENOUS
Status: CANCELLED | OUTPATIENT
Start: 2019-01-01

## 2019-01-01 RX ORDER — GLIMEPIRIDE 1 MG/1
TABLET ORAL
Qty: 45 TABLET | Refills: 2 | Status: SHIPPED | OUTPATIENT
Start: 2019-01-01 | End: 2019-01-01 | Stop reason: ALTCHOICE

## 2019-01-01 RX ORDER — CALCIUM CARBONATE 200(500)MG
1000 TABLET,CHEWABLE ORAL DAILY PRN
Status: DISCONTINUED | OUTPATIENT
Start: 2019-01-01 | End: 2019-01-01 | Stop reason: HOSPADM

## 2019-01-01 RX ORDER — PANTOPRAZOLE SODIUM 40 MG/1
40 INJECTION, POWDER, FOR SOLUTION INTRAVENOUS
Status: DISCONTINUED | OUTPATIENT
Start: 2019-01-01 | End: 2019-01-01 | Stop reason: HOSPADM

## 2019-01-01 RX ORDER — SIMVASTATIN 20 MG
TABLET ORAL
Qty: 90 TABLET | Refills: 3 | Status: SHIPPED | OUTPATIENT
Start: 2019-01-01 | End: 2019-01-01 | Stop reason: HOSPADM

## 2019-01-01 RX ORDER — LABETALOL 200 MG/1
200 TABLET, FILM COATED ORAL 2 TIMES DAILY
Qty: 180 TABLET | Refills: 2 | Status: SHIPPED | OUTPATIENT
Start: 2019-01-01 | End: 2019-01-01 | Stop reason: SDUPTHER

## 2019-01-01 RX ORDER — CHLORHEXIDINE GLUCONATE 0.12 MG/ML
15 RINSE ORAL EVERY 12 HOURS SCHEDULED
Status: DISCONTINUED | OUTPATIENT
Start: 2019-01-01 | End: 2019-01-01 | Stop reason: HOSPADM

## 2019-01-01 RX ORDER — SPIRONOLACTONE 25 MG/1
TABLET ORAL
Qty: 90 TABLET | Refills: 3 | Status: SHIPPED | OUTPATIENT
Start: 2019-01-01 | End: 2019-01-01 | Stop reason: HOSPADM

## 2019-01-01 RX ORDER — HEPARIN SODIUM 1000 [USP'U]/ML
5200 INJECTION, SOLUTION INTRAVENOUS; SUBCUTANEOUS AS NEEDED
Status: CANCELLED | OUTPATIENT
Start: 2019-01-01

## 2019-01-01 RX ORDER — HEPARIN SODIUM 1000 [USP'U]/ML
2600 INJECTION, SOLUTION INTRAVENOUS; SUBCUTANEOUS AS NEEDED
Status: DISCONTINUED | OUTPATIENT
Start: 2019-01-01 | End: 2019-01-01 | Stop reason: HOSPADM

## 2019-01-01 RX ORDER — PIOGLITAZONEHYDROCHLORIDE 15 MG/1
TABLET ORAL
Qty: 90 TABLET | Refills: 2 | Status: SHIPPED | OUTPATIENT
Start: 2019-01-01 | End: 2019-01-01 | Stop reason: ALTCHOICE

## 2019-01-01 RX ORDER — HEPARIN SODIUM 200 [USP'U]/100ML
20 INJECTION, SOLUTION INTRAVENOUS CONTINUOUS
Status: DISCONTINUED | OUTPATIENT
Start: 2019-01-01 | End: 2019-01-01 | Stop reason: HOSPADM

## 2019-01-01 RX ORDER — HEPARIN SODIUM 1000 [USP'U]/ML
5200 INJECTION, SOLUTION INTRAVENOUS; SUBCUTANEOUS AS NEEDED
Status: DISCONTINUED | OUTPATIENT
Start: 2019-01-01 | End: 2019-01-01 | Stop reason: HOSPADM

## 2019-01-01 RX ORDER — HEPARIN SODIUM 5000 [USP'U]/ML
5000 INJECTION, SOLUTION INTRAVENOUS; SUBCUTANEOUS EVERY 8 HOURS SCHEDULED
Status: DISCONTINUED | OUTPATIENT
Start: 2019-01-01 | End: 2019-01-01

## 2019-01-01 RX ORDER — AMLODIPINE BESYLATE 5 MG/1
5 TABLET ORAL DAILY
Status: DISCONTINUED | OUTPATIENT
Start: 2019-01-01 | End: 2019-01-01

## 2019-01-01 RX ORDER — CALCIUM CHLORIDE 100 MG/ML
INJECTION INTRAVENOUS; INTRAVENTRICULAR AS NEEDED
Status: DISCONTINUED | OUTPATIENT
Start: 2019-01-01 | End: 2019-01-01 | Stop reason: SURG

## 2019-01-01 RX ORDER — SODIUM CHLORIDE 9 MG/ML
INJECTION, SOLUTION INTRAVENOUS CONTINUOUS PRN
Status: DISCONTINUED | OUTPATIENT
Start: 2019-01-01 | End: 2019-01-01 | Stop reason: SURG

## 2019-01-01 RX ORDER — FENTANYL CITRATE 50 UG/ML
INJECTION, SOLUTION INTRAMUSCULAR; INTRAVENOUS
Status: COMPLETED
Start: 2019-01-01 | End: 2019-01-01

## 2019-01-01 RX ORDER — HEPARIN SODIUM 1000 [USP'U]/ML
2600 INJECTION, SOLUTION INTRAVENOUS; SUBCUTANEOUS AS NEEDED
Status: CANCELLED | OUTPATIENT
Start: 2019-01-01

## 2019-01-01 RX ORDER — ONDANSETRON 2 MG/ML
4 INJECTION INTRAMUSCULAR; INTRAVENOUS EVERY 6 HOURS PRN
Status: DISCONTINUED | OUTPATIENT
Start: 2019-01-01 | End: 2019-01-01 | Stop reason: HOSPADM

## 2019-01-01 RX ORDER — SODIUM CHLORIDE 9 MG/ML
3 INJECTION INTRAVENOUS AS NEEDED
Status: DISCONTINUED | OUTPATIENT
Start: 2019-01-01 | End: 2019-01-01 | Stop reason: HOSPADM

## 2019-01-01 RX ORDER — GLIMEPIRIDE 1 MG/1
TABLET ORAL
Qty: 45 TABLET | Refills: 3 | Status: SHIPPED | OUTPATIENT
Start: 2019-01-01 | End: 2019-01-01 | Stop reason: HOSPADM

## 2019-01-01 RX ORDER — FENTANYL CITRATE 50 UG/ML
100 INJECTION, SOLUTION INTRAMUSCULAR; INTRAVENOUS ONCE
Status: COMPLETED | OUTPATIENT
Start: 2019-01-01 | End: 2019-01-01

## 2019-01-01 RX ORDER — DOCUSATE SODIUM 100 MG/1
100 CAPSULE, LIQUID FILLED ORAL 2 TIMES DAILY
Status: CANCELLED | OUTPATIENT
Start: 2019-01-01

## 2019-01-01 RX ORDER — INSULIN GLARGINE 100 [IU]/ML
5 INJECTION, SOLUTION SUBCUTANEOUS
Status: DISCONTINUED | OUTPATIENT
Start: 2019-01-01 | End: 2019-01-01

## 2019-01-01 RX ORDER — LEVALBUTEROL 1.25 MG/.5ML
1.25 SOLUTION, CONCENTRATE RESPIRATORY (INHALATION)
Status: DISCONTINUED | OUTPATIENT
Start: 2019-01-01 | End: 2019-01-01

## 2019-01-01 RX ORDER — ACETAMINOPHEN 325 MG/1
650 TABLET ORAL EVERY 6 HOURS PRN
Status: CANCELLED | OUTPATIENT
Start: 2019-01-01

## 2019-01-01 RX ORDER — HEPARIN SODIUM 1000 [USP'U]/ML
5200 INJECTION, SOLUTION INTRAVENOUS; SUBCUTANEOUS ONCE
Status: COMPLETED | OUTPATIENT
Start: 2019-01-01 | End: 2019-01-01

## 2019-01-01 RX ORDER — ALBUTEROL SULFATE 2.5 MG/3ML
2.5 SOLUTION RESPIRATORY (INHALATION) EVERY 4 HOURS PRN
Status: CANCELLED | OUTPATIENT
Start: 2019-01-01

## 2019-01-01 RX ORDER — SODIUM CHLORIDE, SODIUM GLUCONATE, SODIUM ACETATE, POTASSIUM CHLORIDE, MAGNESIUM CHLORIDE, SODIUM PHOSPHATE, DIBASIC, AND POTASSIUM PHOSPHATE .53; .5; .37; .037; .03; .012; .00082 G/100ML; G/100ML; G/100ML; G/100ML; G/100ML; G/100ML; G/100ML
75 INJECTION, SOLUTION INTRAVENOUS CONTINUOUS
Status: CANCELLED | OUTPATIENT
Start: 2019-01-01

## 2019-01-01 RX ORDER — SODIUM CHLORIDE 9 MG/ML
3 INJECTION INTRAVENOUS AS NEEDED
Status: CANCELLED | OUTPATIENT
Start: 2019-01-01

## 2019-01-01 RX ORDER — CHLORHEXIDINE GLUCONATE 0.12 MG/ML
15 RINSE ORAL EVERY 12 HOURS SCHEDULED
Status: CANCELLED | OUTPATIENT
Start: 2019-01-01

## 2019-01-01 RX ORDER — AMLODIPINE BESYLATE 5 MG/1
TABLET ORAL
Qty: 90 TABLET | Refills: 3 | Status: SHIPPED | OUTPATIENT
Start: 2019-01-01 | End: 2019-01-01 | Stop reason: HOSPADM

## 2019-01-01 RX ORDER — PANTOPRAZOLE SODIUM 40 MG/1
40 INJECTION, POWDER, FOR SOLUTION INTRAVENOUS
Status: DISCONTINUED | OUTPATIENT
Start: 2019-12-09 | End: 2019-01-01 | Stop reason: HOSPADM

## 2019-01-01 RX ORDER — GUAIFENESIN 600 MG
600 TABLET, EXTENDED RELEASE 12 HR ORAL 2 TIMES DAILY
Status: DISCONTINUED | OUTPATIENT
Start: 2019-01-01 | End: 2019-01-01

## 2019-01-01 RX ORDER — EPINEPHRINE 1 MG/ML
INJECTION, SOLUTION, CONCENTRATE INTRAVENOUS
Status: COMPLETED
Start: 2019-01-01 | End: 2019-01-01

## 2019-01-01 RX ADMIN — HEPARIN SODIUM 5000 UNITS: 5000 INJECTION INTRAVENOUS; SUBCUTANEOUS at 14:38

## 2019-01-01 RX ADMIN — PRAVASTATIN SODIUM 40 MG: 40 TABLET ORAL at 16:43

## 2019-01-01 RX ADMIN — LABETALOL HYDROCHLORIDE 200 MG: 100 TABLET, FILM COATED ORAL at 20:11

## 2019-01-01 RX ADMIN — ROCURONIUM BROMIDE 10 MG: 50 INJECTION, SOLUTION INTRAVENOUS at 17:41

## 2019-01-01 RX ADMIN — CEFTRIAXONE SODIUM 1000 MG: 10 INJECTION, POWDER, FOR SOLUTION INTRAVENOUS at 17:33

## 2019-01-01 RX ADMIN — HEPARIN SODIUM 5000 UNITS: 5000 INJECTION INTRAVENOUS; SUBCUTANEOUS at 22:14

## 2019-01-01 RX ADMIN — CALCIUM CHLORIDE 1 G: 100 INJECTION, SOLUTION INTRAVENOUS; INTRAVENTRICULAR at 18:43

## 2019-01-01 RX ADMIN — INSULIN LISPRO 2 UNITS: 100 INJECTION, SOLUTION INTRAVENOUS; SUBCUTANEOUS at 09:28

## 2019-01-01 RX ADMIN — LABETALOL HYDROCHLORIDE 200 MG: 100 TABLET, FILM COATED ORAL at 17:40

## 2019-01-01 RX ADMIN — GUAIFENESIN 600 MG: 600 TABLET ORAL at 17:41

## 2019-01-01 RX ADMIN — EPINEPHRINE 1 MG: 1 INJECTION, SOLUTION INTRAMUSCULAR; SUBCUTANEOUS at 18:47

## 2019-01-01 RX ADMIN — AZITHROMYCIN MONOHYDRATE 500 MG: 500 INJECTION, POWDER, LYOPHILIZED, FOR SOLUTION INTRAVENOUS at 17:33

## 2019-01-01 RX ADMIN — LABETALOL HYDROCHLORIDE 200 MG: 100 TABLET, FILM COATED ORAL at 17:33

## 2019-01-01 RX ADMIN — EPINEPHRINE 0.5 MG: 1 INJECTION, SOLUTION INTRAMUSCULAR; SUBCUTANEOUS at 18:37

## 2019-01-01 RX ADMIN — GUAIFENESIN 600 MG: 600 TABLET ORAL at 09:57

## 2019-01-01 RX ADMIN — SODIUM BICARBONATE 50 MEQ: 84 INJECTION, SOLUTION INTRAVENOUS at 18:37

## 2019-01-01 RX ADMIN — SODIUM BICARBONATE 50 MEQ: 84 INJECTION, SOLUTION INTRAVENOUS at 18:45

## 2019-01-01 RX ADMIN — PRAVASTATIN SODIUM 40 MG: 40 TABLET ORAL at 20:12

## 2019-01-01 RX ADMIN — EPINEPHRINE 0.5 MG: 1 INJECTION, SOLUTION INTRAMUSCULAR; SUBCUTANEOUS at 18:30

## 2019-01-01 RX ADMIN — HEPARIN SODIUM 5000 UNITS: 5000 INJECTION INTRAVENOUS; SUBCUTANEOUS at 21:07

## 2019-01-01 RX ADMIN — CALCIUM CHLORIDE 1 G: 100 INJECTION, SOLUTION INTRAVENOUS; INTRAVENTRICULAR at 18:48

## 2019-01-01 RX ADMIN — EPINEPHRINE 1 MCG/MIN: 1 INJECTION, SOLUTION, CONCENTRATE INTRAVENOUS at 17:24

## 2019-01-01 RX ADMIN — INSULIN LISPRO 2 UNITS: 100 INJECTION, SOLUTION INTRAVENOUS; SUBCUTANEOUS at 16:54

## 2019-01-01 RX ADMIN — SODIUM BICARBONATE 50 MEQ: 84 INJECTION, SOLUTION INTRAVENOUS at 18:41

## 2019-01-01 RX ADMIN — ROCURONIUM BROMIDE 20 MG: 50 INJECTION, SOLUTION INTRAVENOUS at 17:45

## 2019-01-01 RX ADMIN — NOREPINEPHRINE BITARTRATE 5 MCG/MIN: 1 INJECTION, SOLUTION, CONCENTRATE INTRAVENOUS at 06:15

## 2019-01-01 RX ADMIN — HEPARIN SODIUM AND DEXTROSE 18 UNITS/KG/HR: 10000; 5 INJECTION INTRAVENOUS at 16:32

## 2019-01-01 RX ADMIN — LEVALBUTEROL HYDROCHLORIDE 1.25 MG: 1.25 SOLUTION, CONCENTRATE RESPIRATORY (INHALATION) at 13:15

## 2019-01-01 RX ADMIN — HEPARIN SODIUM 5000 UNITS: 5000 INJECTION INTRAVENOUS; SUBCUTANEOUS at 15:47

## 2019-01-01 RX ADMIN — INSULIN LISPRO 2 UNITS: 100 INJECTION, SOLUTION INTRAVENOUS; SUBCUTANEOUS at 12:43

## 2019-01-01 RX ADMIN — AMLODIPINE BESYLATE 5 MG: 5 TABLET ORAL at 08:14

## 2019-01-01 RX ADMIN — HEPARIN SODIUM 5000 UNITS: 5000 INJECTION INTRAVENOUS; SUBCUTANEOUS at 13:57

## 2019-01-01 RX ADMIN — CEFTRIAXONE SODIUM 1000 MG: 10 INJECTION, POWDER, FOR SOLUTION INTRAVENOUS at 17:01

## 2019-01-01 RX ADMIN — LABETALOL HYDROCHLORIDE 200 MG: 100 TABLET, FILM COATED ORAL at 10:01

## 2019-01-01 RX ADMIN — LABETALOL HYDROCHLORIDE 200 MG: 100 TABLET, FILM COATED ORAL at 08:14

## 2019-01-01 RX ADMIN — VASOPRESSIN 4 UNITS: 20 INJECTION INTRAVENOUS at 18:36

## 2019-01-01 RX ADMIN — HEPARIN SODIUM 5200 UNITS: 1000 INJECTION INTRAVENOUS; SUBCUTANEOUS at 11:36

## 2019-01-01 RX ADMIN — INSULIN LISPRO 1 UNITS: 100 INJECTION, SOLUTION INTRAVENOUS; SUBCUTANEOUS at 16:29

## 2019-01-01 RX ADMIN — IPRATROPIUM BROMIDE 0.5 MG: 0.5 SOLUTION RESPIRATORY (INHALATION) at 13:15

## 2019-01-01 RX ADMIN — CALCIUM CHLORIDE 1 G: 100 INJECTION, SOLUTION INTRAVENOUS; INTRAVENTRICULAR at 18:39

## 2019-01-01 RX ADMIN — FENTANYL CITRATE 50 MCG: 50 INJECTION INTRAMUSCULAR; INTRAVENOUS at 15:12

## 2019-01-01 RX ADMIN — INSULIN LISPRO 3 UNITS: 100 INJECTION, SOLUTION INTRAVENOUS; SUBCUTANEOUS at 11:58

## 2019-01-01 RX ADMIN — SODIUM BICARBONATE 50 MEQ: 84 INJECTION, SOLUTION INTRAVENOUS at 18:47

## 2019-01-01 RX ADMIN — GUAIFENESIN 600 MG: 600 TABLET ORAL at 17:33

## 2019-01-01 RX ADMIN — LEVALBUTEROL HYDROCHLORIDE 1.25 MG: 1.25 SOLUTION, CONCENTRATE RESPIRATORY (INHALATION) at 08:15

## 2019-01-01 RX ADMIN — PANTOPRAZOLE SODIUM 40 MG: 40 INJECTION, POWDER, FOR SOLUTION INTRAVENOUS at 14:00

## 2019-01-01 RX ADMIN — PHENYLEPHRINE HYDROCHLORIDE 200 MCG: 10 INJECTION INTRAVENOUS at 18:30

## 2019-01-01 RX ADMIN — CHLORHEXIDINE GLUCONATE 15 ML: 1.2 RINSE ORAL at 09:19

## 2019-01-01 RX ADMIN — FENTANYL CITRATE 50 MCG: 50 INJECTION INTRAMUSCULAR; INTRAVENOUS at 07:07

## 2019-01-01 RX ADMIN — VASOPRESSIN 5 UNITS: 20 INJECTION INTRAVENOUS at 18:35

## 2019-01-01 RX ADMIN — DOCUSATE SODIUM 100 MG: 100 CAPSULE, LIQUID FILLED ORAL at 13:56

## 2019-01-01 RX ADMIN — ROCURONIUM BROMIDE 20 MG: 50 INJECTION, SOLUTION INTRAVENOUS at 18:12

## 2019-01-01 RX ADMIN — SODIUM CHLORIDE: 0.9 INJECTION, SOLUTION INTRAVENOUS at 17:07

## 2019-01-01 RX ADMIN — SPIRONOLACTONE 12.5 MG: 25 TABLET ORAL at 08:14

## 2019-01-01 RX ADMIN — VASOPRESSIN 4 UNITS: 20 INJECTION INTRAVENOUS at 18:33

## 2019-01-01 RX ADMIN — HEPARIN SODIUM 5000 UNITS: 5000 INJECTION INTRAVENOUS; SUBCUTANEOUS at 06:01

## 2019-01-01 RX ADMIN — EPINEPHRINE 1 MG: 1 INJECTION, SOLUTION INTRAMUSCULAR; SUBCUTANEOUS at 18:44

## 2019-01-01 RX ADMIN — AMLODIPINE BESYLATE 5 MG: 5 TABLET ORAL at 13:56

## 2019-01-01 RX ADMIN — FENTANYL CITRATE 100 MCG: 50 INJECTION INTRAMUSCULAR; INTRAVENOUS at 06:49

## 2019-01-01 RX ADMIN — EPINEPHRINE 1 MG: 1 INJECTION, SOLUTION INTRAMUSCULAR; SUBCUTANEOUS at 18:41

## 2019-01-01 RX ADMIN — INFLUENZA A VIRUS A/MICHIGAN/45/2015 X-275 (H1N1) ANTIGEN (FORMALDEHYDE INACTIVATED), INFLUENZA A VIRUS A/SINGAPORE/INFIMH-16-0019/2016 IVR-186 (H3N2) ANTIGEN (FORMALDEHYDE INACTIVATED), AND INFLUENZA B VIRUS B/MARYLAND/15/2016 BX-69A (A B/COLORADO/6/2017-LIKE VIRUS) ANTIGEN (FORMALDEHYDE INACTIVATED) 0.5 ML: 60; 60; 60 INJECTION, SUSPENSION INTRAMUSCULAR at 11:36

## 2019-01-01 RX ADMIN — HEPARIN SODIUM 5000 UNITS: 5000 INJECTION INTRAVENOUS; SUBCUTANEOUS at 21:18

## 2019-01-01 RX ADMIN — SPIRONOLACTONE 12.5 MG: 25 TABLET ORAL at 20:26

## 2019-01-01 RX ADMIN — LABETALOL HYDROCHLORIDE 200 MG: 100 TABLET, FILM COATED ORAL at 17:01

## 2019-01-01 RX ADMIN — SODIUM BICARBONATE 50 MEQ: 84 INJECTION, SOLUTION INTRAVENOUS at 18:43

## 2019-01-01 RX ADMIN — GUAIFENESIN 600 MG: 600 TABLET ORAL at 09:19

## 2019-01-01 RX ADMIN — CALCIUM CHLORIDE 1 G: 100 INJECTION, SOLUTION INTRAVENOUS; INTRAVENTRICULAR at 18:46

## 2019-01-01 RX ADMIN — INSULIN LISPRO 1 UNITS: 100 INJECTION, SOLUTION INTRAVENOUS; SUBCUTANEOUS at 11:30

## 2019-01-01 RX ADMIN — IPRATROPIUM BROMIDE 0.5 MG: 0.5 SOLUTION RESPIRATORY (INHALATION) at 08:15

## 2019-01-01 RX ADMIN — AMLODIPINE BESYLATE 5 MG: 5 TABLET ORAL at 09:54

## 2019-01-01 RX ADMIN — INSULIN GLARGINE 5 UNITS: 100 INJECTION, SOLUTION SUBCUTANEOUS at 22:15

## 2019-01-01 RX ADMIN — DOCUSATE SODIUM 100 MG: 100 CAPSULE, LIQUID FILLED ORAL at 17:41

## 2019-01-01 RX ADMIN — CEFTRIAXONE SODIUM 1000 MG: 10 INJECTION, POWDER, FOR SOLUTION INTRAVENOUS at 16:43

## 2019-01-01 RX ADMIN — HEPARIN SODIUM 5000 UNITS: 5000 INJECTION INTRAVENOUS; SUBCUTANEOUS at 21:10

## 2019-01-01 RX ADMIN — INSULIN LISPRO 2 UNITS: 100 INJECTION, SOLUTION INTRAVENOUS; SUBCUTANEOUS at 11:39

## 2019-01-01 RX ADMIN — GUAIFENESIN 600 MG: 600 TABLET ORAL at 20:12

## 2019-01-01 RX ADMIN — GUAIFENESIN 600 MG: 600 TABLET ORAL at 17:01

## 2019-01-01 RX ADMIN — HEPARIN SODIUM 5000 UNITS: 5000 INJECTION INTRAVENOUS; SUBCUTANEOUS at 05:17

## 2019-01-01 RX ADMIN — DOCUSATE SODIUM 100 MG: 100 CAPSULE, LIQUID FILLED ORAL at 08:14

## 2019-01-01 RX ADMIN — INSULIN LISPRO 1 UNITS: 100 INJECTION, SOLUTION INTRAVENOUS; SUBCUTANEOUS at 08:12

## 2019-01-01 RX ADMIN — ALTEPLASE: 2.2 INJECTION, POWDER, LYOPHILIZED, FOR SOLUTION INTRAVENOUS at 18:29

## 2019-01-01 RX ADMIN — HEPARIN SODIUM AND DEXTROSE 18 UNITS/KG/HR: 10000; 5 INJECTION INTRAVENOUS at 11:36

## 2019-01-01 RX ADMIN — PRAVASTATIN SODIUM 40 MG: 40 TABLET ORAL at 15:47

## 2019-01-01 RX ADMIN — SODIUM CHLORIDE, SODIUM GLUCONATE, SODIUM ACETATE, POTASSIUM CHLORIDE AND MAGNESIUM CHLORIDE 75 ML/HR: 526; 502; 368; 37; 30 INJECTION, SOLUTION INTRAVENOUS at 07:54

## 2019-01-01 RX ADMIN — INSULIN LISPRO 1 UNITS: 100 INJECTION, SOLUTION INTRAVENOUS; SUBCUTANEOUS at 21:10

## 2019-01-01 RX ADMIN — PRAVASTATIN SODIUM 40 MG: 40 TABLET ORAL at 17:33

## 2019-01-01 RX ADMIN — INSULIN LISPRO 2 UNITS: 100 INJECTION, SOLUTION INTRAVENOUS; SUBCUTANEOUS at 17:34

## 2019-01-01 RX ADMIN — GUAIFENESIN 600 MG: 600 TABLET ORAL at 08:14

## 2019-01-01 RX ADMIN — HEPARIN SODIUM 5000 UNITS: 5000 INJECTION INTRAVENOUS; SUBCUTANEOUS at 07:06

## 2019-01-01 RX ADMIN — DOCUSATE SODIUM 100 MG: 100 CAPSULE, LIQUID FILLED ORAL at 17:01

## 2019-01-01 RX ADMIN — DOCUSATE SODIUM 100 MG: 100 CAPSULE, LIQUID FILLED ORAL at 09:56

## 2019-01-01 RX ADMIN — CEFTRIAXONE SODIUM 1000 MG: 10 INJECTION, POWDER, FOR SOLUTION INTRAVENOUS at 17:44

## 2019-01-01 RX ADMIN — SPIRONOLACTONE 12.5 MG: 25 TABLET ORAL at 09:19

## 2019-01-01 RX ADMIN — EPINEPHRINE 0.5 MG: 1 INJECTION, SOLUTION INTRAMUSCULAR; SUBCUTANEOUS at 18:38

## 2019-01-01 RX ADMIN — DOCUSATE SODIUM 100 MG: 100 CAPSULE, LIQUID FILLED ORAL at 17:33

## 2019-01-01 RX ADMIN — IODIXANOL 100 ML: 320 INJECTION, SOLUTION INTRAVASCULAR at 12:54

## 2019-01-01 RX ADMIN — INSULIN LISPRO 1 UNITS: 100 INJECTION, SOLUTION INTRAVENOUS; SUBCUTANEOUS at 22:15

## 2019-01-01 RX ADMIN — GUAIFENESIN 600 MG: 600 TABLET ORAL at 13:56

## 2019-01-01 RX ADMIN — FENTANYL CITRATE 100 MCG: 50 INJECTION, SOLUTION INTRAMUSCULAR; INTRAVENOUS at 06:49

## 2019-01-01 RX ADMIN — LABETALOL HYDROCHLORIDE 200 MG: 100 TABLET, FILM COATED ORAL at 13:56

## 2019-01-01 RX ADMIN — PHENYLEPHRINE HYDROCHLORIDE 200 MCG: 10 INJECTION INTRAVENOUS at 18:21

## 2019-01-01 RX ADMIN — HEPARIN SODIUM 5000 UNITS: 5000 INJECTION INTRAVENOUS; SUBCUTANEOUS at 06:46

## 2019-01-01 RX ADMIN — Medication 0.7 MCG/KG/HR: at 06:44

## 2019-02-11 PROBLEM — Z00.00 HEALTHCARE MAINTENANCE: Status: ACTIVE | Noted: 2019-01-01

## 2019-02-11 NOTE — PROGRESS NOTES
Assessment and Plan:    Problem List Items Addressed This Visit        Endocrine    Type 2 diabetes mellitus with hyperglycemia ([address] )       Cardiovascular and Mediastinum    Hypertension       Nervous and Auditory    Carpal tunnel syndrome       Musculoskeletal and Integument    Osteoporosis - Primary       Other    Hyperlipidemia    Healthcare maintenance        Health Maintenance Due   Topic Date Due    Medicare Annual Wellness Visit (AWV)  05/04/1928    BMI: Followup Plan  05/04/1946    HEPATITIS B VACCINES (1 of 3 - Risk 3-dose series) 05/04/1947    URINE MICROALBUMIN  04/21/2016    DXA SCAN  12/07/2017     Medicare wellness completed  Will consider Shingrix  DEXA scan ordered  HPI:Assessment/Plan:    1  Hypertension at goal   2  Diabetes- A1c is 6 6, will discontinue 1/2 tab of Glimepiride at this time, continue generic Actos  We will recheck A1c in three months  3  B/L carpal tunnel- To start wearing wrist splints at nighttime  4  Low bone mineral density on previous DEXA scan in 2015, will repeat DEXA  Follow up with me in three months for A1c check and with Dr Aleksey Padilla in six months, labs prior  Diagnoses and all orders for this visit:    Osteoporosis, unspecified osteoporosis type, unspecified pathological fracture presence  -     DXA bone density spine hip and pelvis; Future  -     CBC and differential; Future  -     Comprehensive metabolic panel; Future  -     Lipid panel; Future    Hypertension, unspecified type  -     DXA bone density spine hip and pelvis; Future  -     CBC and differential; Future  -     Comprehensive metabolic panel; Future  -     Lipid panel; Future    Type 2 diabetes mellitus with hyperglycemia, without long-term current use of insulin ([address] )  -     DXA bone density spine hip and pelvis; Future  -     CBC and differential; Future  -     Comprehensive metabolic panel; Future  -     Lipid panel;  Future    Healthcare maintenance  -     DXA bone density spine hip and pelvis; Future  -     CBC and differential; Future  -     Comprehensive metabolic panel; Future  -     Lipid panel; Future    Carpal tunnel syndrome, unspecified laterality  -     DXA bone density spine hip and pelvis; Future  -     CBC and differential; Future  -     Comprehensive metabolic panel; Future  -     Lipid panel; Future    Hyperlipidemia, unspecified hyperlipidemia type  -     DXA bone density spine hip and pelvis; Future  -     CBC and differential; Future  -     Comprehensive metabolic panel; Future  -     Lipid panel; Future        The patient was counseled regarding instructions for management, risk factor reductions, patient and family education,impressions, risks and benefits of treatment options, side effects of medications, importance of compliance with treatment  The treatment plan was reviewed with the patient/guardian and patient/guardian understands and agrees with the treatment plan  Current Outpatient Medications:     amLODIPine (NORVASC) 5 mg tablet, TAKE 1 TABLET EVERY DAY, Disp: 90 tablet, Rfl: 3    Cholecalciferol (VITAMIN D3) 2000 units capsule, Take 1 capsule by mouth daily, Disp: , Rfl:     labetalol (NORMODYNE) 200 mg tablet, Take 1 tablet (200 mg total) by mouth 2 (two) times a day, Disp: 180 tablet, Rfl: 2    pioglitazone (ACTOS) 15 mg tablet, TAKE 1 TABLET EVERY DAY, Disp: 90 tablet, Rfl: 2    simvastatin (ZOCOR) 20 mg tablet, TAKE 1 TABLET EVERY DAY IN THE EVENING, Disp: 90 tablet, Rfl: 3    spironolactone (ALDACTONE) 25 mg tablet, TAKE 1 TABLET EVERY DAY, Disp: 90 tablet, Rfl: 3    Subjective:      Patient ID: Trav Tinsley is a 80 y o  female  Here for follow up with daughter  C/o B/L numbness to hands which has been a long-standing issue  Continues to work as hairdresser, very active, doing housework etc  Also had productive cough which has significantly improved, denies fever        The following portions of the patient's history were reviewed and updated as appropriate:   She has a past medical history of Hematuria ,  does not have any pertinent problems on file  ,   has a past surgical history that includes Hysterectomy; Replacement total knee; and Tonsillectomy  ,  family history includes Cancer in her family; Heart disease in her family  ,   reports that she has never smoked  She has never used smokeless tobacco  She reports that she does not drink alcohol or use drugs  ,  is allergic to benazepril       Review of Systems   Constitutional: Negative  Respiratory: Positive for cough  Cardiovascular: Negative  Gastrointestinal: Negative for bowel incontinence  Musculoskeletal: Negative  Neurological: Positive for numbness  Psychiatric/Behavioral: Negative            Objective:  /68   Pulse 78   Temp 98 9 °F (37 2 °C) (Tympanic)   Resp 18   Ht 5' 1" (1 549 m)   Wt 70 7 kg (155 lb 12 8 oz)   SpO2 93%   BMI 29 44 kg/m²     Lab Review  Transcribe Orders on 02/07/2019   Component Date Value    WBC 02/07/2019 7 61     RBC 02/07/2019 4 94     Hemoglobin 02/07/2019 14 8     Hematocrit 02/07/2019 46 3*    MCV 02/07/2019 94     MCH 02/07/2019 30 0     MCHC 02/07/2019 32 0     RDW 02/07/2019 13 3     MPV 02/07/2019 10 7     Platelets 47/51/2462 334     nRBC 02/07/2019 0     Neutrophils Relative 02/07/2019 73     Immat GRANS % 02/07/2019 0     Lymphocytes Relative 02/07/2019 14     Monocytes Relative 02/07/2019 9     Eosinophils Relative 02/07/2019 3     Basophils Relative 02/07/2019 1     Neutrophils Absolute 02/07/2019 5 64     Immature Grans Absolute 02/07/2019 0 02     Lymphocytes Absolute 02/07/2019 1 07     Monocytes Absolute 02/07/2019 0 65     Eosinophils Absolute 02/07/2019 0 19     Basophils Absolute 02/07/2019 0 04     Sodium 02/07/2019 141     Potassium 02/07/2019 4 5     Chloride 02/07/2019 106     CO2 02/07/2019 28     ANION GAP 02/07/2019 7     BUN 02/07/2019 23     Creatinine 02/07/2019 1 04     Glucose, Fasting 02/07/2019 164*    Calcium 02/07/2019 10 2*    Corrected Calcium 02/07/2019 10 8*    AST 02/07/2019 16     ALT 02/07/2019 21     Alkaline Phosphatase 02/07/2019 125*    Total Protein 02/07/2019 7 3     Albumin 02/07/2019 3 3*    Total Bilirubin 02/07/2019 0 67     eGFR 02/07/2019 47     Vit D, 25-Hydroxy 02/07/2019 35 4     Hemoglobin A1C 02/07/2019 6 6*    EAG 02/07/2019 143     PTH 02/07/2019 92 5*    Calcium, Ionized 02/07/2019 1 31         Imaging: No results found  Physical Exam   Constitutional: She is oriented to person, place, and time  She appears well-developed and well-nourished  Cardiovascular: Normal rate, regular rhythm, normal heart sounds and intact distal pulses  Pulmonary/Chest: Effort normal and breath sounds normal    Musculoskeletal: Normal range of motion  Neurological: She is alert and oriented to person, place, and time  She has normal reflexes  Psychiatric: She has a normal mood and affect  Her behavior is normal  Judgment and thought content normal          Arturo Hinojosa is a 80 y o  female here for her Subsequent Wellness Visit      Patient Active Problem List   Diagnosis    Allergic rhinitis    Alopecia    Carpal tunnel syndrome    Edema    Essential hypertension    Hypercalcemia    Hyperlipidemia    Hypertension    Lower back pain    Nontoxic single thyroid nodule    Osteoporosis    Rectocele    Type 2 diabetes mellitus with hyperglycemia (HCC)    Vitamin D deficiency    Solar dermatitis    Degenerative joint disease of hand    Healthcare maintenance     Past Medical History:   Diagnosis Date    Hematuria     3OCT2017 RESOLVED     Past Surgical History:   Procedure Laterality Date    HYSTERECTOMY      REPLACEMENT TOTAL KNEE      TONSILLECTOMY       Family History   Problem Relation Age of Onset    Cancer Family     Heart disease Family      Social History     Tobacco Use   Smoking Status Never Smoker   Smokeless Tobacco Never Used     Social History     Substance and Sexual Activity   Alcohol Use No      Social History     Substance and Sexual Activity   Drug Use No       Current Outpatient Medications   Medication Sig Dispense Refill    amLODIPine (NORVASC) 5 mg tablet TAKE 1 TABLET EVERY DAY 90 tablet 3    Cholecalciferol (VITAMIN D3) 2000 units capsule Take 1 capsule by mouth daily      labetalol (NORMODYNE) 200 mg tablet Take 1 tablet (200 mg total) by mouth 2 (two) times a day 180 tablet 2    pioglitazone (ACTOS) 15 mg tablet TAKE 1 TABLET EVERY DAY 90 tablet 2    simvastatin (ZOCOR) 20 mg tablet TAKE 1 TABLET EVERY DAY IN THE EVENING 90 tablet 3    spironolactone (ALDACTONE) 25 mg tablet TAKE 1 TABLET EVERY DAY 90 tablet 3     No current facility-administered medications for this visit  Allergies   Allergen Reactions    Benazepril      Immunization History   Administered Date(s) Administered    INFLUENZA 12/17/2014, 11/03/2015, 11/08/2016, 10/03/2017, 11/13/2018    Influenza Split High Dose Preservative Free IM 11/05/2013, 12/17/2014, 11/03/2015, 11/08/2016, 10/03/2017    Influenza TIV (IM) 10/18/2011    Influenza, high dose seasonal 0 5 mL 11/13/2018    Pneumococcal Conjugate 13-Valent 11/03/2015    Pneumococcal Polysaccharide PPV23 10/06/1997, 10/31/2006    Tdap 12/16/2015       Patient Care Team:  Pramod Briseno DO as PCP - General    Medicare Screening Tests and Risk Assessments:  Duane Ceja is here for her Subsequent Wellness visit  Health Risk Assessment:  Patient rates overall health as good  Patient feels that their physical health rating is Same  Eyesight was rated as Same  Hearing was rated as Same  Patient feels that their emotional and mental health rating is Same  Pain experienced by patient in the last 7 days has been Some  Patient's pain rating has been 5/10  Patient states that she has experienced no weight loss or gain in last 6 months       Emotional/Mental Health:    PHQ-9 Depression Screening:    Frequency of the following problems over the past two weeks:      1  Little interest or pleasure in doing things: 0 - not at all      2  Feeling down, depressed, or hopeless: 0 - not at all  PHQ-2 Score: 0          Broken Bones/Falls: Fall Risk Assessment:    In the past year, patient has experienced: No history of falling in past year          Bladder/Bowel:  Patient has not leaked urine accidently in the last six months  Patient reports no loss of bowel control  Immunizations:  Patient has had a flu vaccination within the last year  Patient has received a pneumonia shot  Patient has not received a shingles shot  Patient has received tetanus/diphtheria shot  Home Safety:  Patient does not have trouble with stairs inside or outside of their home  Patient currently reports that there are no safety hazards present in home, working smoke alarms, no working carbon monoxide detectors  Preventative Screenings:   No breast cancer screening performed, no colon cancer screen completed, cholesterol screen completed, glaucoma eye exam completed,     Nutrition:  Current diet: Regular with servings of the following:    Medications:  Patient is currently taking over-the-counter supplements  Patient is able to manage medications  Lifestyle Choices:  Patient reports no tobacco use  Patient has not smoked or used tobacco in the past   Patient reports no alcohol use  Patient drives a vehicle  Patient wears seat belt  Activities of Daily Living:  Can get out of bed by his or her self, able to dress self, able to make own meals, able to do own shopping, able to bathe self, can do own laundry/housekeeping, can manage own money, pay bills and track expenses    Advanced Directives:  Patient has decided on a power of   Patient has spoken to designated power of   Patient has not completed advanced directive          Preventative Screening/Counseling:      Cardiovascular: General: Patient Declines          Diabetes:      General: Screening Current          Colorectal Cancer:      General: Screening Not Indicated          Breast Cancer:      General: Screening Not Indicated          Cervical Cancer:      General: Screening Not Indicated          Osteoporosis:      General: Risks and Benefits Discussed          AAA:      General: Screening Not Indicated          Glaucoma:      General: Screening Current          HIV:      General: Screening Not Indicated          Hepatitis C:      General: Screening Not Indicated        Advanced Directives:   Patient has no living will for healthcare, 5 wishes given       Immunizations:  Patient reviewed and up to date      Influenza: Influenza UTD This Year      Pneumococcal: Lifetime Vaccine Completed      Shingrix: Risks & Benefits Discussed      Zostavax: Risks & Benefits Discussed      TDAP: Tdap Vaccine UTD

## 2019-02-11 NOTE — PATIENT INSTRUCTIONS
1  Hypertension at goal   2  Diabetes- A1c is 6 6, will discontinue 1/2 tab of Glimepiride at this time, continue generic Actos  We will recheck A1c in three months  3  B/L carpal tunnel- To start wearing wrist splints at nighttime  4  Low bone mineral density on previous DEXA scan in 2015, will repeat DEXA  Follow up with me in three months for A1c check and with Dr Elicia Hendricks in six months, labs prior

## 2019-02-20 PROBLEM — H11.32 NON-TRAUMATIC SUBCONJUNCTIVAL HEMORRHAGE OF LEFT EYE: Status: ACTIVE | Noted: 2019-01-01

## 2019-02-20 PROBLEM — R13.11 ORAL PHASE DYSPHAGIA: Status: ACTIVE | Noted: 2019-01-01

## 2019-02-20 PROBLEM — M54.12 RIGHT CERVICAL RADICULOPATHY: Status: ACTIVE | Noted: 2019-01-01

## 2019-02-20 NOTE — PROGRESS NOTES
Assessment/Plan:    1 right subconjunctival hemorrhage will check for coagulopathy  2  Patient with dysphagia in the oral phase get stuck sometimes throws up will get video barium swallow suspect presbyesophagus also refer to Gastroenterology endoscopy may be indicated  3  Patient has a frozen right shoulder on exam also pain down her right arm from her neck will refer for physical therapy get x-ray of her neck as well  Will see her back in 3 weeks            Diagnoses and all orders for this visit:    Oral phase dysphagia  -     Ambulatory referral to Gastroenterology; Future  -     FL barium swallow video w speech; Future    Right cervical radiculopathy  -     XR spine cervical complete 4 or 5 vw non injury; Future  -     Ambulatory referral to Physical Therapy; Future    Non-traumatic subconjunctival hemorrhage of left eye  -     Protime-INR; Future  -     PTT LA With Reflex To Hexagonal Phase Confirm; Future    Adhesive capsulitis of right shoulder  -     Ambulatory referral to Physical Therapy; Future  -     XR shoulder 2+ vw right; Future    Fatigue, unspecified type  -     Lyme Antibody Profile with reflex to WB; Future  -     CBC and differential; Future        The patient was counseled regarding instructions for management, risk factor reductions, patient and family education,impressions, risks and benefits of treatment options, side effects of medications, importance of compliance with treatment  The treatment plan was reviewed with the patient/guardian and patient/guardian understands and agrees with the treatment plan              Current Outpatient Medications:     amLODIPine (NORVASC) 5 mg tablet, TAKE 1 TABLET EVERY DAY, Disp: 90 tablet, Rfl: 3    Cholecalciferol (VITAMIN D3) 2000 units capsule, Take 1 capsule by mouth daily, Disp: , Rfl:     labetalol (NORMODYNE) 200 mg tablet, Take 1 tablet (200 mg total) by mouth 2 (two) times a day, Disp: 180 tablet, Rfl: 2    pioglitazone (ACTOS) 15 mg tablet, TAKE 1 TABLET EVERY DAY, Disp: 90 tablet, Rfl: 2    simvastatin (ZOCOR) 20 mg tablet, TAKE 1 TABLET EVERY DAY IN THE EVENING, Disp: 90 tablet, Rfl: 3    spironolactone (ALDACTONE) 25 mg tablet, TAKE 1 TABLET EVERY DAY, Disp: 90 tablet, Rfl: 3    Subjective:      Patient ID: Timmy Haji is a 80 y o  female  2-3 weeks ago developed neck and shoulder pain related after cleaning water from basement, OS red no pain, no visual problems, improved then worse yesterday, right neck and down right arm, cough clear sputum    Trouble swallowing 1-2 weeks,       The following portions of the patient's history were reviewed and updated as appropriate:   She has a past medical history of Hematuria ,  does not have any pertinent problems on file  ,   has a past surgical history that includes Hysterectomy; Replacement total knee; and Tonsillectomy  ,  family history includes Cancer in her family; Heart disease in her family  ,   reports that she has never smoked  She has never used smokeless tobacco  She reports that she does not drink alcohol or use drugs  ,  is allergic to benazepril       Review of Systems   Constitutional: Positive for fatigue  Negative for appetite change, chills, fever and unexpected weight change  HENT: Negative for congestion, ear pain, facial swelling, hearing loss, mouth sores, nosebleeds, postnasal drip, rhinorrhea, sinus pain, sore throat, trouble swallowing and voice change  Eyes: Negative for pain, discharge, redness and visual disturbance  Respiratory: Positive for cough  Negative for apnea, chest tightness, shortness of breath, wheezing and stridor  Cardiovascular: Negative for chest pain, palpitations and leg swelling  Gastrointestinal: Positive for vomiting  Negative for abdominal distention, abdominal pain, blood in stool, constipation and diarrhea  Endocrine: Negative for cold intolerance, heat intolerance, polydipsia, polyphagia and polyuria     Genitourinary: Negative for difficulty urinating, dysuria, flank pain, frequency, genital sores, hematuria and urgency  Musculoskeletal: Positive for myalgias  Negative for arthralgias and back pain  Skin: Negative for rash and wound  Allergic/Immunologic: Negative for environmental allergies, food allergies and immunocompromised state  Neurological: Negative for dizziness, tremors, seizures, syncope, facial asymmetry, speech difficulty, weakness, light-headedness, numbness and headaches  Hematological: Negative for adenopathy  Does not bruise/bleed easily  Psychiatric/Behavioral: Negative for agitation, behavioral problems, dysphoric mood, hallucinations, self-injury, sleep disturbance and suicidal ideas  The patient is not hyperactive            Objective:  /70 (BP Location: Left arm, Patient Position: Sitting)   Pulse 101   Temp 99 2 °F (37 3 °C)   Resp 16   Ht 5' 1" (1 549 m)   Wt 68 9 kg (152 lb)   SpO2 96%   BMI 28 72 kg/m²     Lab Review  Transcribe Orders on 02/07/2019   Component Date Value    WBC 02/07/2019 7 61     RBC 02/07/2019 4 94     Hemoglobin 02/07/2019 14 8     Hematocrit 02/07/2019 46 3*    MCV 02/07/2019 94     MCH 02/07/2019 30 0     MCHC 02/07/2019 32 0     RDW 02/07/2019 13 3     MPV 02/07/2019 10 7     Platelets 44/61/0317 334     nRBC 02/07/2019 0     Neutrophils Relative 02/07/2019 73     Immat GRANS % 02/07/2019 0     Lymphocytes Relative 02/07/2019 14     Monocytes Relative 02/07/2019 9     Eosinophils Relative 02/07/2019 3     Basophils Relative 02/07/2019 1     Neutrophils Absolute 02/07/2019 5 64     Immature Grans Absolute 02/07/2019 0 02     Lymphocytes Absolute 02/07/2019 1 07     Monocytes Absolute 02/07/2019 0 65     Eosinophils Absolute 02/07/2019 0 19     Basophils Absolute 02/07/2019 0 04     Sodium 02/07/2019 141     Potassium 02/07/2019 4 5     Chloride 02/07/2019 106     CO2 02/07/2019 28     ANION GAP 02/07/2019 7     BUN 02/07/2019 23     Creatinine 02/07/2019 1 04     Glucose, Fasting 02/07/2019 164*    Calcium 02/07/2019 10 2*    Corrected Calcium 02/07/2019 10 8*    AST 02/07/2019 16     ALT 02/07/2019 21     Alkaline Phosphatase 02/07/2019 125*    Total Protein 02/07/2019 7 3     Albumin 02/07/2019 3 3*    Total Bilirubin 02/07/2019 0 67     eGFR 02/07/2019 47     Vit D, 25-Hydroxy 02/07/2019 35 4     Hemoglobin A1C 02/07/2019 6 6*    EAG 02/07/2019 143     PTH 02/07/2019 92 5*    Calcium, Ionized 02/07/2019 1 31          Imaging  @[id number]@     XR spine cervical complete 4 or 5 vw non injury    (Results Pending)   FL barium swallow video w speech    (Results Pending)   XR shoulder 2+ vw right    (Results Pending)     No results found for this or any previous visit  Physical Exam   Constitutional: She is oriented to person, place, and time  She appears well-developed  HENT:   Right Ear: External ear normal    Left Ear: External ear normal    Eyes: Right eye exhibits no discharge  Left eye exhibits no discharge  No scleral icterus  Right subconjunctival hemorrhage   Neck: Carotid bruit is not present  No tracheal deviation present  No thyroid mass and no thyromegaly present  Cardiovascular: Normal rate, regular rhythm, normal heart sounds and intact distal pulses  Exam reveals no gallop and no friction rub  No murmur heard  Pulmonary/Chest: No respiratory distress  She has no wheezes  She has no rales  Musculoskeletal: She exhibits no edema  Able to abduct right shoulder at 45° with no pain spasms of the trapezius muscle on the right   Lymphadenopathy:     She has no cervical adenopathy  Neurological: She is alert and oriented to person, place, and time  Coordination normal    Psychiatric: She has a normal mood and affect  Her behavior is normal  Judgment and thought content normal    Nursing note and vitals reviewed

## 2019-02-27 PROBLEM — M47.22 OSTEOARTHRITIS OF SPINE WITH RADICULOPATHY, CERVICAL REGION: Status: ACTIVE | Noted: 2019-01-01

## 2019-02-27 PROBLEM — M19.019 OSTEOARTHRITIS OF AC (ACROMIOCLAVICULAR) JOINT: Status: ACTIVE | Noted: 2019-01-01

## 2019-02-27 NOTE — TELEPHONE ENCOUNTER
----- Message from Pramod Briseno DO sent at 2/27/2019  9:40 AM EST -----  She does have mild osteoarthritis her to he has of capsulitis of her right shoulder may improve with physical therapy

## 2019-02-27 NOTE — TELEPHONE ENCOUNTER
Patient aware and understood also states she doesn't have any pain right now     ----- Message from Charlotte Perez DO sent at 2/27/2019  9:40 AM EST -----  She does have mild osteoarthritis her to he has of capsulitis of her right shoulder may improve with physical therapy

## 2019-03-11 NOTE — TELEPHONE ENCOUNTER
Daughter wants to know if this appt is needed  Pt refused physical therapy 2 times   She did do the xrays and bloodwork  appt on 3/20

## 2019-08-30 NOTE — PROGRESS NOTES
Assessment/Plan:    1  Hypertension at goal   2  Hyperlipidemia at goal  3  Diabetes- A1c 6 7  Continue 1/2 tab of Glimepiride and will recheck A1c in six months  Follow up with me as needed and with Dr Ruby Browne in six  Months, labs prior  Diagnoses and all orders for this visit:    Essential hypertension  -     CBC and differential; Future  -     Comprehensive metabolic panel; Future  -     HEMOGLOBIN A1C W/ EAG ESTIMATION; Future  -     Lipid panel; Future    Type 2 diabetes mellitus with hyperglycemia, without long-term current use of insulin (HCC)  -     POCT hemoglobin A1c  -     glimepiride (AMARYL) 1 mg tablet; 1/2 tab po QD  -     CBC and differential; Future  -     Comprehensive metabolic panel; Future  -     HEMOGLOBIN A1C W/ EAG ESTIMATION; Future  -     Lipid panel; Future    Hyperlipidemia, unspecified hyperlipidemia type  -     CBC and differential; Future  -     Comprehensive metabolic panel; Future  -     HEMOGLOBIN A1C W/ EAG ESTIMATION; Future  -     Lipid panel; Future        The patient was counseled regarding instructions for management, risk factor reductions, patient and family education,impressions, risks and benefits of treatment options, side effects of medications, importance of compliance with treatment  The treatment plan was reviewed with the patient/guardian and patient/guardian understands and agrees with the treatment plan              Current Outpatient Medications:     amLODIPine (NORVASC) 5 mg tablet, TAKE 1 TABLET EVERY DAY, Disp: 90 tablet, Rfl: 3    Cholecalciferol (VITAMIN D3) 2000 units capsule, Take 1 capsule by mouth daily, Disp: , Rfl:     labetalol (NORMODYNE) 200 mg tablet, Take 1 tablet (200 mg total) by mouth 2 (two) times a day, Disp: 180 tablet, Rfl: 1    simvastatin (ZOCOR) 20 mg tablet, TAKE 1 TABLET EVERY DAY IN THE EVENING, Disp: 90 tablet, Rfl: 3    spironolactone (ALDACTONE) 25 mg tablet, TAKE 1 TABLET EVERY DAY, Disp: 90 tablet, Rfl: 3   glimepiride (AMARYL) 1 mg tablet, 1/2 tab po QD, Disp: 45 tablet, Rfl: 3    Subjective:      Patient ID: Oanh Sanches is a 80 y o  female  Here for follow up  She has not been taking generic Actos due to some confusion with her medications  She has been continuing 1/2 tab of Glimepiride instead  Still working as a hairdresser, remaining active  No complaints today  The following portions of the patient's history were reviewed and updated as appropriate:   She has a past medical history of Hematuria ,  does not have any pertinent problems on file  ,   has a past surgical history that includes Hysterectomy; Replacement total knee; and Tonsillectomy  ,  family history includes Cancer in her family; Heart disease in her family  ,   reports that she has never smoked  She has never used smokeless tobacco  She reports that she does not drink alcohol or use drugs  ,  is allergic to benazepril       Review of Systems   Constitutional: Negative  Respiratory: Negative  Cardiovascular: Negative  Musculoskeletal: Negative  Psychiatric/Behavioral: Negative            Objective:  /62 (BP Location: Left arm)   Pulse 84   Temp 99 1 °F (37 3 °C)   Resp 16   Ht 5' 1" (1 549 m)   Wt 68 9 kg (152 lb)   SpO2 96%   BMI 28 72 kg/m²     Lab Review  Appointment on 08/26/2019   Component Date Value    WBC 08/26/2019 7 04     RBC 08/26/2019 5 25*    Hemoglobin 08/26/2019 15 2     Hematocrit 08/26/2019 47 5*    MCV 08/26/2019 91     MCH 08/26/2019 29 0     MCHC 08/26/2019 32 0     RDW 08/26/2019 12 7     MPV 08/26/2019 9 6     Platelets 39/70/4617 327     nRBC 08/26/2019 0     Neutrophils Relative 08/26/2019 70     Immat GRANS % 08/26/2019 0     Lymphocytes Relative 08/26/2019 18     Monocytes Relative 08/26/2019 7     Eosinophils Relative 08/26/2019 4     Basophils Relative 08/26/2019 1     Neutrophils Absolute 08/26/2019 4 87     Immature Grans Absolute 08/26/2019 0 03     Lymphocytes Absolute 08/26/2019 1 25     Monocytes Absolute 08/26/2019 0 51     Eosinophils Absolute 08/26/2019 0 31     Basophils Absolute 08/26/2019 0 07     Sodium 08/26/2019 140     Potassium 08/26/2019 4 6     Chloride 08/26/2019 104     CO2 08/26/2019 29     ANION GAP 08/26/2019 7     BUN 08/26/2019 23     Creatinine 08/26/2019 0 92     Glucose, Fasting 08/26/2019 180*    Calcium 08/26/2019 9 5     AST 08/26/2019 21     ALT 08/26/2019 24     Alkaline Phosphatase 08/26/2019 122*    Total Protein 08/26/2019 7 1     Albumin 08/26/2019 3 4*    Total Bilirubin 08/26/2019 0 70     eGFR 08/26/2019 55     Cholesterol 08/26/2019 162     Triglycerides 08/26/2019 118     HDL, Direct 08/26/2019 48     LDL Calculated 08/26/2019 90     Non-HDL-Chol (CHOL-HDL) 08/26/2019 114         Imaging: No results found  Physical Exam   Constitutional: She is oriented to person, place, and time  She appears well-developed and well-nourished  Cardiovascular: Normal rate, regular rhythm, normal heart sounds and intact distal pulses  Pulses are no weak pulses  Pulses:       Dorsalis pedis pulses are 2+ on the right side, and 2+ on the left side  Posterior tibial pulses are 2+ on the right side, and 2+ on the left side  Pulmonary/Chest: Effort normal and breath sounds normal    Musculoskeletal: Normal range of motion  Feet:   Right Foot:   Skin Integrity: Negative for ulcer, skin breakdown, erythema, warmth, callus or dry skin  Left Foot:   Skin Integrity: Negative for ulcer, skin breakdown, erythema, warmth, callus or dry skin  Neurological: She is alert and oriented to person, place, and time  She has normal reflexes  Psychiatric: She has a normal mood and affect  Her behavior is normal  Judgment and thought content normal        Patient's shoes and socks removed  Right Foot/Ankle   Right Foot Inspection  Skin Exam: skin normal and skin intact no dry skin, no warmth, no callus, no erythema, no maceration, no abnormal color, no pre-ulcer, no ulcer and no callus                          Toe Exam: ROM and strength within normal limits  Sensory   Vibration: intact    Monofilament testing: intact  Vascular  Capillary refills: < 3 seconds  The right DP pulse is 2+  The right PT pulse is 2+  Left Foot/Ankle  Left Foot Inspection  Skin Exam: skin normal and skin intactno dry skin, no warmth, no erythema, no maceration, normal color, no pre-ulcer, no ulcer and no callus                         Toe Exam: ROM and strength within normal limits                   Sensory   Vibration: intact    Monofilament: intact  Vascular  Capillary refills: < 3 seconds  The left DP pulse is 2+  The left PT pulse is 2+  Assign Risk Category:  No deformity present; No loss of protective sensation;  No weak pulses       Risk: 0

## 2019-08-30 NOTE — PATIENT INSTRUCTIONS
1  Hypertension at goal   2  Hyperlipidemia at goal  3  Diabetes- A1c 6 7  Continue 1/2 tab of Glimepiride and will recheck A1c in six months  Follow up with me as needed and with Dr Jo Eli in six  Months, labs prior

## 2019-12-04 PROBLEM — J18.9 COMMUNITY ACQUIRED PNEUMONIA: Status: ACTIVE | Noted: 2019-01-01

## 2019-12-04 PROBLEM — N17.9 ACUTE KIDNEY INJURY (HCC): Status: ACTIVE | Noted: 2019-01-01

## 2019-12-04 PROBLEM — R77.8 ELEVATED TROPONIN: Status: ACTIVE | Noted: 2019-01-01

## 2019-12-04 PROBLEM — R06.02 SOB (SHORTNESS OF BREATH): Status: ACTIVE | Noted: 2019-01-01

## 2019-12-04 NOTE — ED NOTES
1  CC: SOB  2  Orientation status: A/O X4   3  Abnormal labs/vitals/assessment: Troponin 0 42; BNP 6,660; BUN 35; Glucose 269  4  Iv/drains/etc : 20 right AC   5  Last time narcotics given: none   6  Medications/drips: azithromycin at 125 ml/hr  7  Ambulation status: independent   8  Isolation status: none   9  Skin: not assessed   10  Trauma: none   11   ED phone number: [phone], RN  12/04/19 4611

## 2019-12-04 NOTE — ASSESSMENT & PLAN NOTE
Evidence on chest x-ray and chest CT  Will start on IV ceftriaxone and p o   Azithromycin  Urine antigens and sputum culture pending  Will screen for flu  Procalcitonin pending  Blood cultures pending  Currently on oxygen in the ED, will wean as patient tolerates, does not wear oxygen at baseline  Mucinex, pulmonary toileting  No need for Pulmonary consult

## 2019-12-04 NOTE — ASSESSMENT & PLAN NOTE
Baseline appears to be 0 8-1 0  1 23 on admission  Patient appears euvolemic, encourage p o  Intake  BMP in a m

## 2019-12-04 NOTE — ASSESSMENT & PLAN NOTE
Potentially in the setting of acute illness, verses GERMAN, verses old age  Denies any chest pain or chest tightness  Nonspecific EKG changes  Will monitor on telemetry for 24 hours

## 2019-12-04 NOTE — ASSESSMENT & PLAN NOTE
Lab Results   Component Value Date    HGBA1C 6 7 (A) 08/30/2019       No results for input(s): POCGLU in the last 72 hours      Blood Sugar Average: Last 72 hrs:   takes Glimepiride at home, will hold inpatient  Carb controlled diet  Sliding scale as needed

## 2019-12-04 NOTE — ASSESSMENT & PLAN NOTE
Evidence on chest x-ray and chest CT  Discontinue abx at this time and monitor   Urine antigens are negative and sputum culture pending  Flu is negative  Procalciton is negative  Blood cultures pending  Currently on oxygen in the ED, will wean as patient tolerates, does not wear oxygen at baseline  Mucinex, pulmonary toileting  No need for Pulmonary consult

## 2019-12-04 NOTE — TELEPHONE ENCOUNTER
S/w daughter Helen Jimenez as well as Katie  Pt c/o worsening SOB x 2 days  Unable to go on Bus trip the morning due to severe SOB  Pt denies chest,arm or back pain,edema in extremities,headache,nausea,change in vision  Helen Jimenez states katie is ashen in color and extremely SOB  advised to go right to [facility] for Eval and Tx as per Dr Watkins verbalized understanding of same

## 2019-12-04 NOTE — ED PROVIDER NOTES
History  Chief Complaint   Patient presents with    Shortness of Breath     onst " for a little bit" got worse today, w/ exertion  denies chest pain     81 y/o female, DM and HTN,  presents to the ED for sob and cough  She states that symptoms started yesterday  States that she has had nonproductive cough and sob, worse with exertion  She denies any cp, abd pain, n/v, f/c, urinary symptoms, or d/c  She denies any hx of similar in the past  States that she has not taken anything for the symptoms  Denies any smoking hx  No known sick contacts  No other complaints  History provided by:  Patient  Shortness of Breath   Severity:  Moderate  Onset quality:  Sudden  Duration:  1 day  Timing:  Constant  Progression:  Worsening  Chronicity:  New  Context: not smoke exposure    Relieved by:  None tried  Worsened by:  Exertion  Associated symptoms: cough    Associated symptoms: no abdominal pain, no chest pain, no ear pain, no fever, no headaches, no neck pain, no rash, no sore throat, no vomiting and no wheezing    Risk factors: no hx of PE/DVT, no prolonged immobilization and no tobacco use        Prior to Admission Medications   Prescriptions Last Dose Informant Patient Reported? Taking?    Cholecalciferol (VITAMIN D3) 2000 units capsule   Yes No   Sig: Take 1 capsule by mouth daily   amLODIPine (NORVASC) 5 mg tablet   No No   Sig: TAKE 1 TABLET EVERY DAY   glimepiride (AMARYL) 1 mg tablet   No No   Si/2 tab po QD   labetalol (NORMODYNE) 200 mg tablet   No No   Sig: Take 1 tablet (200 mg total) by mouth 2 (two) times a day   simvastatin (ZOCOR) 20 mg tablet   No No   Sig: TAKE 1 TABLET EVERY DAY IN THE EVENING   spironolactone (ALDACTONE) 25 mg tablet   No No   Sig: TAKE 1 TABLET EVERY DAY      Facility-Administered Medications: None       Past Medical History:   Diagnosis Date    Diabetes mellitus ([facility] [address] )     Hematuria     2017 RESOLVED    Hypertension        Past Surgical History:   Procedure Laterality Date  HYSTERECTOMY      REPLACEMENT TOTAL KNEE      TONSILLECTOMY         Family History   Problem Relation Age of Onset    Cancer Family     Heart disease Family      I have reviewed and agree with the history as documented  Social History     Tobacco Use    Smoking status: Never Smoker    Smokeless tobacco: Never Used   Substance Use Topics    Alcohol use: No    Drug use: No        Review of Systems   Constitutional: Negative for chills and fever  HENT: Negative for congestion, ear pain and sore throat  Eyes: Negative for pain and visual disturbance  Respiratory: Positive for cough and shortness of breath  Negative for wheezing  Cardiovascular: Negative for chest pain and leg swelling  Gastrointestinal: Negative for abdominal pain, diarrhea, nausea and vomiting  Genitourinary: Negative for dysuria, frequency, hematuria and urgency  Musculoskeletal: Negative for neck pain and neck stiffness  Skin: Negative for rash and wound  Neurological: Negative for weakness, numbness and headaches  Psychiatric/Behavioral: Negative for agitation and confusion  All other systems reviewed and are negative  Physical Exam  Physical Exam   Constitutional: She is oriented to person, place, and time  She appears well-developed and well-nourished  HENT:   Head: Normocephalic and atraumatic  Mouth/Throat: Oropharynx is clear and moist    Eyes: Pupils are equal, round, and reactive to light  EOM are normal    Neck: Normal range of motion  Neck supple  Cardiovascular: Normal rate and regular rhythm  Pulmonary/Chest: Effort normal  She has no decreased breath sounds  She has no wheezes  She has rhonchi in the right upper field  She has no rales  Abdominal: Soft  Bowel sounds are normal  She exhibits no distension  There is no tenderness  Musculoskeletal: Normal range of motion  Right lower leg: She exhibits no edema  Left lower leg: She exhibits no edema     Neurological: She is alert and oriented to person, place, and time  No focal deficits   Skin: Skin is warm and dry  Nursing note and vitals reviewed  Vital Signs  ED Triage Vitals [12/04/19 1148]   Temperature Pulse Respirations Blood Pressure SpO2   97 5 °F (36 4 °C) 80 18 125/65 90 %      Temp Source Heart Rate Source Patient Position - Orthostatic VS BP Location FiO2 (%)   Oral Monitor Sitting Left arm --      Pain Score       --           Vitals:    12/04/19 1330 12/04/19 1430 12/04/19 1530 12/04/19 1630   BP: 119/57 125/69 133/61 145/74   Pulse: 73 73 70 75   Patient Position - Orthostatic VS: Lying Lying           Visual Acuity      ED Medications  Medications   sodium chloride (PF) 0 9 % injection 3 mL (has no administration in time range)   azithromycin (ZITHROMAX) 500 mg in sodium chloride 0 9% 250mL IVPB 500 mg (has no administration in time range)   ceftriaxone (ROCEPHIN) 1 g/50 mL in dextrose IVPB (0 mg Intravenous Stopped 12/4/19 1726)       Diagnostic Studies  Results Reviewed     Procedure Component Value Units Date/Time    Troponin I [[id number]] Collected:  12/04/19 1657    Lab Status: In process Specimen:  Blood from Arm, Right Updated:  12/04/19 1711    Influenza A/B and RSV PCR [[id number]] Collected:  12/04/19 1700    Lab Status:   In process Specimen:  Nose Updated:  12/04/19 1710    Troponin I [[id number]]  (Abnormal) Collected:  12/04/19 1313    Lab Status:  Final result Specimen:  Blood from Arm, Right Updated:  12/04/19 1400     Troponin I 0 42 ng/mL     NT-BNP PRO [[id number]]  (Abnormal) Collected:  12/04/19 1313    Lab Status:  Final result Specimen:  Blood from Arm, Right Updated:  12/04/19 1346     NT-proBNP 6,660 pg/mL     Comprehensive metabolic panel [[id number]]  (Abnormal) Collected:  12/04/19 1313    Lab Status:  Final result Specimen:  Blood from Arm, Right Updated:  12/04/19 1339     Sodium 138 mmol/L      Potassium 5 1 mmol/L      Chloride 101 mmol/L      CO2 28 mmol/L      ANION GAP 9 mmol/L BUN 35 mg/dL      Creatinine 1 23 mg/dL      Glucose 269 mg/dL      Calcium 10 7 mg/dL      AST 15 U/L      ALT 25 U/L      Alkaline Phosphatase 148 U/L      Total Protein 7 8 g/dL      Albumin 3 5 g/dL      Total Bilirubin 0 70 mg/dL      eGFR 38 ml/min/1 73sq m     Narrative:       [place] guidelines for Chronic Kidney Disease (CKD):     Stage 1 with normal or high GFR (GFR > 90 mL/min/1 73 square meters)    Stage 2 Mild CKD (GFR = 60-89 mL/min/1 73 square meters)    Stage 3A Moderate CKD (GFR = 45-59 mL/min/1 73 square meters)    Stage 3B Moderate CKD (GFR = 30-44 mL/min/1 73 square meters)    Stage 4 Severe CKD (GFR = 15-29 mL/min/1 73 square meters)    Stage 5 End Stage CKD (GFR <15 mL/min/1 73 square meters)  Note: GFR calculation is accurate only with a steady state creatinine    CBC and differential [[id number]]  (Abnormal) Collected:  12/04/19 1313    Lab Status:  Final result Specimen:  Blood from Arm, Right Updated:  12/04/19 1325     WBC 9 45 Thousand/uL      RBC 5 37 Million/uL      Hemoglobin 15 7 g/dL      Hematocrit 47 6 %      MCV 89 fL      MCH 29 2 pg      MCHC 33 0 g/dL      RDW 12 5 %      MPV 9 8 fL      Platelets 319 Thousands/uL      nRBC 0 /100 WBCs      Neutrophils Relative 85 %      Immat GRANS % 0 %      Lymphocytes Relative 10 %      Monocytes Relative 5 %      Eosinophils Relative 0 %      Basophils Relative 0 %      Neutrophils Absolute 7 91 Thousands/µL      Immature Grans Absolute 0 03 Thousand/uL      Lymphocytes Absolute 0 95 Thousands/µL      Monocytes Absolute 0 50 Thousand/µL      Eosinophils Absolute 0 02 Thousand/µL      Basophils Absolute 0 04 Thousands/µL                  CT chest wo contrast   Final Result by Anjana Zamora MD (12/04 4774)   There are nodular areas of groundglass in the both upper lobe with the additional nodular area of mixed attenuation in the superior segment of the left lower lobe  These May be acute or chronic  If these are acute the could represent atypical    infiltrate           The lesion seen in the superior segment left lower lobe with a 13 millimeters solid component within it, indeterminate,   Suggest short interval follow-up suggested in 6-8 weeks to exclude neoplastic lesion  Clinical correlation suggested   Follow-up notification has been created      Workstation performed: [id number]         X-ray chest 2 views   Final Result by Jacinta Griffin MD (12/04 1605)      Groundglass opacity in the left lung on CT not visible on radiograph  Trace effusions  Large goiter  Workstation performed: [id number]                    Procedures  ECG 12 Lead Documentation Only  Date/Time: 12/4/2019 5:26 PM  Performed by: Carole Almonte DO  Authorized by: Carole Almonte DO     Indications / Diagnosis:  Sob   Patient location:  ED  Previous ECG:     Previous ECG:  Unavailable  Rate:     ECG rate:  80    ECG rate assessment: normal    Rhythm:     Rhythm: sinus rhythm    Ectopy:     Ectopy: none    QRS:     QRS axis:  Normal    QRS intervals:  Normal  ST segments:     ST segments:  Normal  T waves:     T waves: inverted      Inverted:  V2, V3 and V4             ED Course  ED Course as of Dec 04 1728   Wed Dec 04, 2019   1248 Sob x few days  Worse today  MDM  Number of Diagnoses or Management Options  Elevated troponin: new and requires workup  Hypoxia: new and requires workup  Pneumonia: new and requires workup  Diagnosis management comments: Patient with sob and cough- will get cardiac workup and ct chest  Will admit     Patient reevaluated and feels improved  Patient updated on results of tests and plan of care including admission to hospital for further evaluation of presenting complaint  Patient demonstrates verbal understanding and agrees with plan  Report to Dr Lorraine Callaway  With SLIM for continuation of patient care          Amount and/or Complexity of Data Reviewed  Clinical lab tests: ordered and reviewed  Tests in the radiology section of CPT®: ordered and reviewed  Tests in the medicine section of CPT®: ordered and reviewed  Discussion of test results with the performing providers: yes  Decide to obtain previous medical records or to obtain history from someone other than the patient: yes  Obtain history from someone other than the patient: yes  Review and summarize past medical records: yes  Discuss the patient with other providers: yes  Independent visualization of images, tracings, or specimens: yes    Patient Progress  Patient progress: improved        Disposition  Final diagnoses:   Pneumonia   Hypoxia   Elevated troponin     Time reflects when diagnosis was documented in both MDM as applicable and the Disposition within this note     Time User Action Codes Description Comment    12/4/2019  4:56 PM Kenan SHOOK Add [J18 9] Pneumonia     12/4/2019  4:56 PM Kenan Raya Add [R09 02] Hypoxia     12/4/2019  4:56 PM Rose Blood Add [R79 89] Elevated troponin       ED Disposition     ED Disposition Condition Date/Time Comment    Admit Stable Wed Dec 4, 2019  4:56 PM Case was discussed with MARINA and the patient's admission status was agreed to be Admission Status: inpatient status to the service of Dr Andrew Edouard   Follow-up Information    None         Patient's Medications   Discharge Prescriptions    No medications on file     No discharge procedures on file      ED Provider  Electronically Signed by           Patricio Vanegas DO  12/04/19 1732

## 2019-12-04 NOTE — ASSESSMENT & PLAN NOTE
Potentially in the setting of acute illness, verses GERMAN, verses old age  0 42/0 42/0 31/0 29 flatly elevated in setting of age and illness   Denies any chest pain or chest tightness  Nonspecific EKG changes  Will monitor on telemetry for 24 hours

## 2019-12-04 NOTE — ASSESSMENT & PLAN NOTE
Has been experiencing off and on over the last few weeks  Likely in the setting of pneumonia  Supportive care  Pneumonia pathway initiated

## 2019-12-04 NOTE — H&P
[address] [facility]  H&P- Melvin Terrell 5/4/1928, 80 y o  female MRN: [id number]    Unit/Bed#: [facility] Encounter: [id number]    Primary Care Provider: Gorge Morton DO   Date and time admitted to hospital: 12/4/2019 12:27 PM    * SOB (shortness of breath)  Assessment & Plan  Has been experiencing off and on over the last few weeks  Likely in the setting of pneumonia  Supportive care  Pneumonia pathway initiated    Community acquired pneumonia  Assessment & Plan  Evidence on chest x-ray and chest CT  Will start on IV ceftriaxone and p o  Azithromycin  Urine antigens and sputum culture pending  Will screen for flu  Procalcitonin pending  Blood cultures pending  Currently on oxygen in the ED, will wean as patient tolerates, does not wear oxygen at baseline  Mucinex, pulmonary toileting  No need for Pulmonary consult    Acute kidney injury [facility])  Assessment & Plan  Baseline appears to be 0 8-1 0  1 23 on admission  Patient appears euvolemic, encourage p o  Intake  BMP in a m  Elevated troponin  Assessment & Plan  Potentially in the setting of acute illness, verses GERMAN, verses old age  Denies any chest pain or chest tightness  Nonspecific EKG changes  Will monitor on telemetry for 24 hours    Type 2 diabetes mellitus with hyperglycemia [facility])  Assessment & Plan  Lab Results   Component Value Date    HGBA1C 6 7 (A) 08/30/2019       No results for input(s): POCGLU in the last 72 hours  Blood Sugar Average: Last 72 hrs:   takes Glimepiride at home, will hold inpatient  Carb controlled diet  Sliding scale as needed    Hypertension  Assessment & Plan  Appears controlled on review  Continue home medications    Hyperlipidemia  Assessment & Plan  Continue statin       VTE Prophylaxis: Heparin  / sequential compression device   Code Status:  Full code  POLST: POLST form is not discussed and not completed at this time    Discussion with family:  Family at bedside    Anticipated Length of Stay:  Patient will be admitted on an Inpatient basis with an anticipated length of stay of  > 2 midnights  Justification for Hospital Stay:  Patient will require IV antibiotics and supportive care    Total Time for Visit, including Counseling / Coordination of Care: 1 hour  Greater than 50% of this total time spent on direct patient counseling and coordination of care  Chief Complaint:   Shortness of breath    History of Present Illness:    Og Cartwright is a 80 y o  female who presents with shortness of breath  Patient reports worsening dyspnea with exertion over the last several weeks  She denies any sick contacts denies any fevers chills denies smoking ambulates independently and 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    Shortness of Breath   This is a new problem  The current episode started in the past 7 days (1-2 weeks)  The problem occurs intermittently  The problem has been waxing and waning  Duration: several  Pertinent negatives include no chest pain, fever, headaches, hemoptysis, leg pain, leg swelling, orthopnea, sore throat, sputum production, syncope or wheezing  The symptoms are aggravated by any activity  The patient has no known risk factors for DVT/PE  She has tried rest for the symptoms  The treatment provided significant relief  There is no history of allergies, asthma, COPD, a heart failure or PE  Review of Systems:    Review of Systems   Constitutional: Positive for fatigue  Negative for fever  HENT: Negative for sore throat  Eyes: Negative  Respiratory: Positive for shortness of breath  Negative for cough, hemoptysis, sputum production, chest tightness and wheezing  Cardiovascular: Negative for chest pain, orthopnea, leg swelling and syncope  Gastrointestinal: Negative  Endocrine: Negative  Genitourinary: Negative  Musculoskeletal: Negative  Skin: Negative  Neurological: Negative for headaches  Hematological: Negative  Psychiatric/Behavioral: Negative      All other systems reviewed and are negative  Past Medical and Surgical History:     Past Medical History:   Diagnosis Date    Diabetes mellitus ([facility] [address] )     Hematuria     [id number] RESOLVED    Hypertension        Past Surgical History:   Procedure Laterality Date    HYSTERECTOMY      REPLACEMENT TOTAL KNEE      TONSILLECTOMY         Meds/Allergies:    Prior to Admission medications    Medication Sig Start Date End Date Taking? Authorizing Provider   amLODIPine (NORVASC) 5 mg tablet TAKE 1 TABLET EVERY DAY 3/26/19   Willie Ochoa, DO   Cholecalciferol (VITAMIN D3) 2000 units capsule Take 1 capsule by mouth daily 10/3/17   Historical Provider, MD   glimepiride (AMARYL) 1 mg tablet 1/2 tab po QD 8/30/19   OSIRIS Lee   labetalol (NORMODYNE) 200 mg tablet Take 1 tablet (200 mg total) by mouth 2 (two) times a day 8/13/19   Tapiasoham Ochoa, DO   simvastatin (ZOCOR) 20 mg tablet TAKE 1 TABLET EVERY DAY IN THE EVENING 9/12/19   Tapiasoham Ochoa, DO   spironolactone (ALDACTONE) 25 mg tablet TAKE 1 TABLET EVERY DAY 3/26/19   Tapiasoham Ochoa, DO     I have reviewed home medications with patient personally  Allergies: Allergies   Allergen Reactions    Benazepril        Social History:     Marital Status:     Occupation:  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  Patient Pre-hospital Living Situation:  Private residence  Patient Pre-hospital Level of Mobility:  Independent  Patient Pre-hospital Diet Restrictions:  None  Substance Use History:   Social History     Substance and Sexual Activity   Alcohol Use No     Social History     Tobacco Use   Smoking Status Never Smoker   Smokeless Tobacco Never Used     Social History     Substance and Sexual Activity   Drug Use No       Family History:    Family History   Problem Relation Age of Onset    Cancer Family     Heart disease Family        Physical Exam:     Vitals:   Blood Pressure: 145/74 (12/04/19 1630)  Pulse: 75 (12/04/19 1630)  Temperature: 97 5 °F (36 4 °C) (12/04/19 1148)  Temp Source: Oral (12/04/19 1148)  Respirations: 22 (12/04/19 1630)  SpO2: 96 % (12/04/19 1630)    Physical Exam   Constitutional: She is oriented to person, place, and time  She appears well-developed and well-nourished  No distress  HENT:   Head: Normocephalic  Eyes: Pupils are equal, round, and reactive to light  Neck: Normal range of motion  Neck supple  Cardiovascular: Normal rate and regular rhythm  Pulmonary/Chest: Effort normal and breath sounds normal  No respiratory distress  Abdominal: Soft  Bowel sounds are normal  She exhibits no distension  There is no tenderness  Musculoskeletal: Normal range of motion  Neurological: She is alert and oriented to person, place, and time  Skin: Skin is warm and dry  Psychiatric: She has a normal mood and affect  Her behavior is normal  Judgment and thought content normal    Nursing note and vitals reviewed  Additional Data:     Lab Results: I have personally reviewed pertinent reports  Results from last 7 days   Lab Units 12/04/19  1313   WBC Thousand/uL 9 45   HEMOGLOBIN g/dL 15 7*   HEMATOCRIT % 47 6*   PLATELETS Thousands/uL 290   NEUTROS PCT % 85*   LYMPHS PCT % 10*   MONOS PCT % 5   EOS PCT % 0     Results from last 7 days   Lab Units 12/04/19  1313   SODIUM mmol/L 138   POTASSIUM mmol/L 5 1   CHLORIDE mmol/L 101   CO2 mmol/L 28   BUN mg/dL 35*   CREATININE mg/dL 1 23   ANION GAP mmol/L 9   CALCIUM mg/dL 10 7*   ALBUMIN g/dL 3 5   TOTAL BILIRUBIN mg/dL 0 70   ALK PHOS U/L 148*   ALT U/L 25   AST U/L 15   GLUCOSE RANDOM mg/dL 269*                       Imaging: I have personally reviewed pertinent reports  CT chest wo contrast   Final Result by Petty Gruber MD (12/04 7964)   There are nodular areas of groundglass in the both upper lobe with the additional nodular area of mixed attenuation in the superior segment of the left lower lobe  These May be acute or chronic    If these are acute the could represent atypical    infiltrate           The lesion seen in the superior segment left lower lobe with a 13 millimeters solid component within it, indeterminate,   Suggest short interval follow-up suggested in 6-8 weeks to exclude neoplastic lesion  Clinical correlation suggested   Follow-up notification has been created      Workstation performed: [id number]         X-ray chest 2 views   Final Result by Jacob Tavares MD (12/04 1605)      Groundglass opacity in the left lung on CT not visible on radiograph  Trace effusions  Large goiter  Workstation performed: [id number]             EKG, Pathology, and Other Studies Reviewed on Admission:   · EKG:  Prolonged QT, T-wave abnormality    Allscripts / Epic Records Reviewed: Yes     ** Please Note: This note has been constructed using a voice recognition system   **

## 2019-12-05 NOTE — PHYSICAL THERAPY NOTE
Physical Therapy Evaluation     Patient's Name: [facility]    Admitting Diagnosis  Pneumonia [J18 9]  SOB (shortness of breath) [R06 02]  Hypoxia [R09 02]  Elevated troponin [R79 89]    Problem List  Patient Active Problem List   Diagnosis    Allergic rhinitis    Alopecia    Carpal tunnel syndrome    Edema    Essential hypertension    Hypercalcemia    Hyperlipidemia    Hypertension    Lower back pain    Nontoxic single thyroid nodule    Osteoporosis    Rectocele    Type 2 diabetes mellitus with hyperglycemia ([facility] [address] )    Vitamin D deficiency    Solar dermatitis    Degenerative joint disease of hand    Healthcare maintenance    Oral phase dysphagia    Right cervical radiculopathy    Non-traumatic subconjunctival hemorrhage of left eye    Osteoarthritis of spine with radiculopathy, cervical region    Osteoarthritis of AC (acromioclavicular) joint    SOB (shortness of breath)    Community acquired pneumonia    Elevated troponin    Acute kidney injury [facility])       Past Medical History  Past Medical History:   Diagnosis Date    Diabetes mellitus ([facility] [address] )     Hematuria     3OCT2017 RESOLVED    Hypertension        Past Surgical History  Past Surgical History:   Procedure Laterality Date    HYSTERECTOMY      REPLACEMENT TOTAL KNEE      TONSILLECTOMY          12/05/19 0818   Note Type   Note type Eval/Treat   Pain Assessment   Pain Assessment No/denies pain   Home Living   Type of [address] [place] [address] in basement; Work area in basement; Other (Comment); One level  (0 PRINCE, enters basement at ground level, flight to 1st floor)   Bathroom Shower/Tub Walk-in shower  (basement level)   Bathroom Toilet Standard   Bathroom Equipment Other (Comment)  (no DME at baseline)   [address] Walker;Cane;Wheelchair-manual;Other (Comment)  (available, not using at baseline per pt)   Additional Comments (+) driving   Prior Function   Level of [place] Independent with ADLs and functional mobility   Lives With Alone   Receives Help From Family  (2 daughters live locally)   ADL Assistance Independent   IADLs Independent   Falls in the last 6 months 0   Vocational Retired   Comments works occasionally in her [employer] shop at home   Restrictions/Precautions   [place] Bearing Precautions Per Order No   Braces or Orthoses   (none at baseline per pt)   Other Precautions Fall Risk;O2;Telemetry; Chair Alarm   General   Family/Caregiver Present No   Cognition   Overall Cognitive Status WFL   Arousal/Participation Alert   Orientation Level Oriented X4   Memory Within functional limits   Following Commands Follows all commands and directions without difficulty   Comments Pt agreeable to PT evaluation today  Pt able to verify full name and   RUE Assessment   RUE Assessment WFL  (defer to OT eval for comment)   LUE Assessment   LUE Assessment WFL  (defer to OT eval for comment)   RLE Assessment   RLE Assessment WFL  (assessed upon functional activity, at least 3+/5)   LLE Assessment   LLE Assessment WFL  (assessed upon functional activity, at least 3+/5)   Coordination   Movements are Fluid and Coordinated 1   Sensation WFL   Bed Mobility   Rolling R 5  Supervision   Additional items Assist x 1;HOB elevated; Bedrails; Increased time required   Supine to Sit 5  Supervision   Additional items Assist x 1;HOB elevated; Bedrails; Increased time required   Additional Comments Pt received lying supine in bed upon arrival  Pt returned to recliner at end of session with all needs in reach  Transfers   Sit to Stand 5  Supervision   Additional items Assist x 1;Bedrails; Increased time required   Stand to Sit 5  Supervision   Additional items Assist x 1; Armrests; Verbal cues  (VCs to ensure proper hand/foot placement)   Toilet transfer 4  Minimal assistance   Additional items Assist x 1; Increased time required;Verbal cues;Standard toilet   Ambulation/Elevation   Gait pattern Retropulsion;Decreased foot clearance; Short stride  (w/o RW)   Gait Assistance 4  Minimal assist   Additional items Assist x 2;Verbal cues  (VCs to orient to direction)   Assistive Device None   Distance 15'   Balance   Static Sitting Fair   Dynamic Sitting Fair -   Static Standing Fair -   Dynamic Standing Poor +   Ambulatory Poor +   Endurance Deficit   Endurance Deficit Yes   Endurance Deficit Description Pt experienced SOB upon exertion, with SpO2 dropping to 85% with ambulation on 4L O2 NC  SOB and SpO2 improved to 91% with deep breathing exercises and rest   Activity Tolerance   Activity Tolerance Patient limited by fatigue   Medical Staff Made Aware PT [place], [place] Surinder Menonal   Nurse Made Aware RN [facility] verbalized pt appropriate for PT evaluation today and was made aware of all session outcomes/recs  Assessment   Prognosis Good   Problem List Decreased strength;Decreased endurance; Impaired balance;Decreased mobility   Assessment Pt is a 51-year-old female who was admitted to [address] [place] [address] on 12/04/19 with a diagnosis of SOB  PT was consulted for functional mobility assessment  Active PT evaluation and treatment and up and OOB orders  Her previous medical history is significant for hypertension, hyperlipidemia, type 2 diabetes mellitus, acute kidney injury, osteoporosis, osteoarthritis of the spine with radiculopathy, osteoarthritis of the [facility] joint, and lower back pain  Prior to her admission, pt was living alone in a one-story house and was independent with all functional activity and ambulation  Personal factors include limited caregiver support  Pt was verbalized as appropriate for PT evaluation today by RN Esperanza, and pt was agreeable to PT evaluation today  Pt's SpO2 93% on 4L O2 via NC at rest upon session start  Trialed 2L O2 at rest, SpO2 remained > 91%  Upon exertion, pt reported SOB and SpO2 dropped to 87%   SOB improved and SpO2 increased to 91% with deep breathing exercises/pursed lip breathing, rest, and increasing O2 to 4L  Other physical limitations upon evaluation include: decreased endurance, decreased strength, impaired balance, and decreased overall mobility, resulting in a need for increased assistance with bed mobility, transfers, and ambulation, impacting the pt's ability to perform functional activities safely and independently  Other objective outcome measures assessed upon initial evaluation: Barthel 50/100, Modified Venus 4  Pt's presentation is currently unstable due to her multiple comorbidities, ongoing medical assessment, and abnormal lab values  At this time, pt is recommended for STR to address the above impairments and return to her prior level of function  Pt would benefit from continued PT throughout her hospital stay with an emphasis on bed mobility, transfers, and ambulation  Recommend continued mobility trials ad mariella with nursing as tolerable to build endurance and improve overall physical functioning  Barriers to Discharge Inaccessible home environment;Decreased caregiver support   Goals   Patient Goals to return home   STG Expiration Date 12/15/19   Short Term Goal #1 In 7-10 days, pt will 1) ambulate 150' with least restrictive device with modified independence, 2) perform all bed mobility with modified independence to decrease caregiver burden, 3) perform all transfers with modified independence to improve overall mobility, 4) increase B LE strength by 1/2 grade to improve ambulation, 5) maintain static standing balance for > 5 minutes to improve performance of functional activities, 6) improve static and dynamic sitting and standing balance by 1/2 grade to improve overall physical functioning, 7) PT to see to establish stair goal   Plan   Treatment/Interventions Functional transfer training;LE strengthening/ROM; Therapeutic exercise; Endurance training;Patient/family training;Equipment eval/education;Gait training;Spoke to nursing;Bed mobility;Elevations   PT Frequency Other (Comment)  (3-5x/wk)   Recommendation   Recommendation Short-term skilled PT   Equipment Recommended   (TBD)   PT - OK to Discharge Yes  (when medically cleared; if to STR)   Modified Venus Scale   Modified Venus Scale 4   Barthel Index   Feeding 10   Bathing 0   Grooming Score 0   Dressing Score 5   Bladder Score 10   Bowels Score 10   Toilet Use Score 5   Transfers (Bed/Chair) Score 10   Mobility (Level Surface) Score 0   Stairs Score 0   Barthel Index Score 50       [employer], SPT

## 2019-12-05 NOTE — ASSESSMENT & PLAN NOTE
Lab Results   Component Value Date    HGBA1C 7 0 (H) 12/04/2019       Recent Labs     12/04/19 2052 12/05/19  0553 12/05/19  1131 12/05/19  1603   POCGLU 204* 175* 215* 171*       Blood Sugar Average: Last 72 hrs:  (P) 191 25 takes Glimepiride at home, will hold inpatient  Carb controlled diet  Sliding scale as needed

## 2019-12-05 NOTE — ASSESSMENT & PLAN NOTE
· Evidence on chest x-ray and chest CT  · Continue IV Ceftriaxone (day 3)  · Strep pneumo and legionella negative, therefore azithromycin was discontinued as patient also with mild prolonged QTc on EKG  · Consider addition of flouroquinolone if continued decline in respiratory status   · Pt is requiring 3 5 L of oxygen currently with O2 sats in the low 90s  Is not on oxygen at home  Will need home O2 eval once stable for d/c  · Procalcitonin x 2 negative, however due to continued shortness of breath and worsening hypoxia, continue abx at this time  · Sputum culture not sent   · Influenza swab negative, will obtain respiratory allergen panel   · Mucinex, pulmonary toileting  · If pt continues to have more oxygen demand, consider obtaining ECHO, V/Q scan, however low susceptibility for PE at this time   Likely worsening respiratory status in setting of acute pneumonia

## 2019-12-05 NOTE — PHYSICAL THERAPY NOTE
PHYSICAL THERAPY NOTE          Patient Name: Calvin Bailey  [id number] Date: 12/5/2019 12/05/19 0813   Pain Assessment   Pain Assessment No/denies pain   Restrictions/Precautions   Weight Bearing Precautions Per Order No   Braces or Orthoses Other (Comment)  (none at baseline per pt)   Other Precautions Fall Risk;O2;Telemetry; Chair Alarm   General   Chart Reviewed Yes   Response to Previous Treatment Patient with no complaints from previous session  Family/Caregiver Present No   Cognition   Overall Cognitive Status WFL   Arousal/Participation Alert; Cooperative   Attention Within functional limits   Orientation Level Oriented X4   Memory Within functional limits   Following Commands Follows all commands and directions without difficulty   Comments Pt agreeable to PT treatment session today  Subjective   Subjective Pt received lying supine in bed upon arrival  Pt returned to recliner with all needs in reach at end of session  Transfers   Stand to Sit 5  Supervision   Additional items Assist x 1; Armrests; Verbal cues  (VCs to ensure proper hand/foot placement)   Ambulation/Elevation   Gait pattern Short stride  (improved foot clearance, standing balance better with RW)   Gait Assistance 5  Supervision   Additional items Assist x 1;Verbal cues  (VCs for gait sequencing with RW)   Assistive Device Rolling walker   Distance 15'   Balance   Static Sitting Fair   Dynamic Sitting [address] [facility]  (with RW)   Dynamic Standing Fair -  (with RW)   Ambulatory Fair -  (with RW)   Endurance Deficit   Endurance Deficit Yes   Endurance Deficit Description Pt experienced SOB upon exertion, with SpO2 dropping to 85% with ambulation on 4L O2 NC   SOB and SpO2 improved to 91% with deep breathing exercises and rest   Activity Tolerance   Activity Tolerance Patient limited by fatigue   Medical Staff Made Aware PT C[place]il [place] Nurse Made Aware RN [address] verbalized pt appropriate for PT treatment session today and was made aware of all session outcomes/recs  Assessment   Prognosis Good   Problem List Decreased strength;Decreased endurance; Impaired balance;Decreased mobility   Assessment Pt verbalized to be appropriate for PT treatment session today by RN Esperanza, and pt agreeable to PT treatment  Pt's SpO2 93% on 4L O2 via NC at rest upon session start  Trialed 2L O2 at rest, SpO2 remained > 91%  Upon exertion, pt reported SOB and SpO2 dropped to 87%  SOB improved and SpO2 increased to 91% with deep breathing exercises, rest, and increasing O2 to 4L  Pt able to ambulate 15' with min A x2 upon first ambulation trial  Pt's gait unsteady and retropulsive without AD, performed 2nd ambulation trial with RW  Education was provided for gait sequencing with RW, and pt required VCs to maintain sequencing for safety  Pt balance much improved with RW, with no retropulsion and improve foot clearance during ambulation  Pt able to ambulate 15' with RW with supervision on 4L O2  Pt reported SOB and SpO2 decreased to 85% with ambulation  SpO2 returned to 91% with rest and deep breathing exercises  Pt returned to recliner at end of session with all needs in reach with chair alarm on  At this time, pt is recommended for STR to address the above impairments and return to her prior level of function  Recommend continued PT throughout her hospital stay with emphasis on ambulation using RW to promote safe mobility at this time  Recommend mobility trials as tolerable with nursing staff to improve overall physical function  Barriers to Discharge Inaccessible home environment;Decreased caregiver support   Goals   Patient Goals to return home   STG Expiration Date 12/15/19   PT Treatment Day 1   Plan   Treatment/Interventions Functional transfer training;LE strengthening/ROM; Elevations; Therapeutic exercise; Endurance training;Patient/family training;Equipment eval/education; Bed mobility;Gait training;Spoke to nursing   Progress Progressing toward goals   PT Frequency Other (Comment)  (3-5x/wk)   Recommendation   Recommendation Short-term skilled PT   Equipment Recommended Other (Comment)  (TBD, continue trials with RW)   PT - OK to Discharge Yes  (when medically cleared; if to STR)     Ru Smith, SPT

## 2019-12-05 NOTE — PLAN OF CARE
Problem: OCCUPATIONAL THERAPY ADULT  Goal: Performs self-care activities at highest level of function for planned discharge setting  See evaluation for individualized goals  Description  Treatment Interventions: ADL retraining, Functional transfer training, UE strengthening/ROM, Endurance training, Patient/family training, Compensatory technique education, Continued evaluation, Energy conservation, Activityengagement          See flowsheet documentation for full assessment, interventions and recommendations  Note:   Limitation: Decreased ADL status, Decreased endurance, Decreased self-care trans, Decreased high-level ADLs  Prognosis: Good  Assessment: Patient is a 80 y o  female seen for OT evaluation s/p admit to [id number] [place] [address] on 12/4/2019 w/SOB (shortness of breath)  Commorbidities affecting patient's functional performance at time of assessment include: Community acquired Pneumonia, Acute kidney injury, elevated troponin, type 2 DM with hyperglycemia  Patient presented to ED with worsening dyspnea with exertion over the last several weeks  Orders placed for OT evaluation and treatment  Performed at least two patient identifiers during session including name and wristband  Prior to admission, Patient reported independent with ADLs, IADLs  Patient lives alone in a 2 story house with no PRINCE, enters thru the basemant with i full flight to bedroom  Patient ambulates with no AD, 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  Patient drives and amanges her affairs indepependnetly, daughters and grand children are very supportive  Personal factors affecting patient at time of initial evaluation include: limited caregiver support, steps to enter, decreased initiation and engagement, difficulty performing ADLs and difficulty performing IADLs   Upon evaluation, patient requires supervision, set up and contact guard assist for UB ADLs, minimal  assist for LB ADLs, transfers and functional ambulation in room and bathroom with minimal  assist, with the use of Rolling Walker  Occupational performance is affected by the following deficits: degenerative arthritic joint changes, impaired gross motor coordination, dynamic sit/ stand balance deficit with poor standing tolerance time for self care and functional mobility, decreased activity tolerance and postural control and postural alignment deficit, requiring external assistance to complete transitional movements  Therapist completed  extensive additional review of medical records and additional review of physical, cognitive or psychosocial history, clinical examination identifying 5 or more performance deficits, clinical decision making of a high complexity , consistent with a high complexity level evaluation  Patient to benefit from continued Occupational Therapy treatment while in the hospital to address deficits as defined above and maximize level of functional independence with ADLs and functional mobility        OT Discharge Recommendation: Short Term Rehab

## 2019-12-05 NOTE — PLAN OF CARE
Problem: PHYSICAL THERAPY ADULT  Goal: Performs mobility at highest level of function for planned discharge setting  See evaluation for individualized goals  Description  Treatment/Interventions: Functional transfer training, LE strengthening/ROM, Elevations, Therapeutic exercise, Endurance training, Patient/family training, Equipment eval/education, Bed mobility, Gait training, Spoke to nursing  Equipment Recommended: Other (Comment)(TBD, continue trials with RW)       See flowsheet documentation for full assessment, interventions and recommendations  12/5/2019 1529 by Fidencio Romero  Outcome: Progressing  Note:   Prognosis: Good  Problem List: Decreased strength, Decreased endurance, Impaired balance, Decreased mobility, Decreased safety awareness  Assessment: Pt verbalized to be appropriate for PT treatment session today by RN Esperanza, and pt agreeable to PT treatment  Pt's two daughters and son-in-law present throughout session  Pt willing to complete ther ex but declined mobility today, stating "we better hold off"  Pt on 3L O2 via NC throughout session  Pt able to complete all there ex in sitting without any pain  Pt reported mild SOB after completing 8 sitting exercises, SpO2 90%  Pt able to perform sit<>stand and stand<>sit transfers with RW with supervision x1, with VCs to ensure proper hand and foot placement for completion of transfer safely  Pt able to perform standing hip abduction with RW for B UE support, but reported SOB with SpO2 decreasing to 87%  SpO2 increased to 91% with deep breathing exercises/pursed lip breathing and rest  Reviewed pursed lip breathing with pt and encouraged her to do this whenever she experienced SOB  Pt returned to recliner at end of session with all needs in reach and chair alarm engaged  SpO2 at end of session remained > 91%  Had lengthy discussion with family regarding pt's decline at home and their concerns for her return home   History provided by family inconsistent with history from pt, as family noted pt has been experiencing SOB and fatigue upon exertion for quite some time  Pt seemed to have decreased concern for her decline in mobility  Family is concerned for pt's safe discharge  PT discussed POC, progress with PT this date, and goals to facilitate functional independence  PT Michael Fields tigertexted Josse Anand informing her that family wants further conversation with SLIM regarding pt's medical concerns  Family agreeable to therapy recommendations saying, "whatever you think is best"  At this time, pt is recommended for STR to address the above impairments and return to her prior level of function  Pt would benefit from continued PT throughout her hospital stay with emphasis on ambulation with RW  Recommend continued mobility trials ad mariella as tolerable with nursing to build endurance  Recommend continued review of pursed lip breathing with pt  Barriers to Discharge: Inaccessible home environment, Decreased caregiver support     Recommendation: Short-term skilled PT     PT - OK to Discharge: Yes(when medically cleared; if to STR)    See flowsheet documentation for full assessment  12/5/2019 1142 by Caryn Mariee  Note:   Prognosis: Good  Problem List: Decreased strength, Decreased endurance, Impaired balance, Decreased mobility  Assessment: Pt verbalized to be appropriate for PT treatment session today by RN Esperanza, and pt agreeable to PT treatment  Pt's SpO2 93% on 4L O2 via NC at rest upon session start  Trialed 2L O2 at rest, SpO2 remained > 91%  Upon exertion, pt reported SOB and SpO2 dropped to 87%  SOB improved and SpO2 increased to 91% with deep breathing exercises, rest, and increasing O2 to 4L  Pt able to ambulate 15' with min A x2 upon first ambulation trial  Pt's gait unsteady and retropulsive without AD, performed 2nd ambulation trial with RW  Education was provided for gait sequencing with RW, and pt required VCs to maintain sequencing for safety   Pt balance much improved with RW, with no retropulsion and improve foot clearance during ambulation  Pt able to ambulate 15' with RW with supervision on 4L O2  Pt reported SOB and SpO2 decreased to 85% with ambulation  SpO2 returned to 91% with rest and deep breathing exercises  Pt returned to recliner at end of session with all needs in reach with chair alarm on  At this time, pt is recommended for STR to address the above impairments and return to her prior level of function  Recommend continued PT throughout her hospital stay with emphasis on ambulation using RW to promote safe mobility at this time  Recommend mobility trials as tolerable with nursing staff to improve overall physical function  Barriers to Discharge: Inaccessible home environment, Decreased caregiver support     Recommendation: Short-term skilled PT     PT - OK to Discharge: Yes(when medically cleared; if to STR)    See flowsheet documentation for full assessment

## 2019-12-05 NOTE — PROGRESS NOTES
Progress Note - Cameron Delarosa 5/4/1928, 80 y o  female MRN: [id number]    Unit/Bed#: -[facility] Encounter: [id number]    Primary Care Provider: Verónica Levin DO   Date and time admitted to hospital: 12/4/2019 12:27 PM    * SOB (shortness of breath)  Assessment & Plan  Has been experiencing off and on over the last few weeks  Likely in the setting of pneumonia  Supportive care  Pneumonia pathway initiated    Community acquired pneumonia  Assessment & Plan  Evidence on chest x-ray and chest CT  Will continue Rocephin for an additional 24 hours given that patient's dyspnea and desaturation on room air  Urine antigens are negative and sputum culture pending  Flu is negative  Procalciton is negative times to will get 1 more procalcitonin tomorrow, if continues to be negative consider discontinuing antibiotics  Blood cultures pending  Currently on oxygen in the ED, will wean as patient tolerates, does not wear oxygen at baseline  Mucinex, pulmonary toileting  No need for Pulmonary consult    Acute kidney injury [facility])  Assessment & Plan  Resolved with fluids  1 07 today    Elevated troponin  Assessment & Plan  Potentially in the setting of acute illness, verses GERMAN, verses old age  0 42/0 42/0 31/0 29 flatly elevated in setting of age and illness   Denies any chest pain or chest tightness  Nonspecific EKG changes  Will monitor on telemetry for 24 hours    Type 2 diabetes mellitus with hyperglycemia [facility])  Assessment & Plan  Lab Results   Component Value Date    HGBA1C 7 0 (H) 12/04/2019       Recent Labs     12/04/19  2052 12/05/19  0553 12/05/19  1131 12/05/19  1603   POCGLU 204* 175* 215* 171*       Blood Sugar Average: Last 72 hrs:  (P) 191 25 takes Glimepiride at home, will hold inpatient  Carb controlled diet  Sliding scale as needed    Hypertension  Assessment & Plan  Appears controlled on review  Continue home medications    Hyperlipidemia  Assessment & Plan  Continue statin       VTE Pharmacologic Prophylaxis:   Pharmacologic: Heparin  Mechanical VTE Prophylaxis in Place: Yes    Patient Centered Rounds: I have performed bedside rounds with nursing staff today  Discussions with Specialists or Other Care Team Provider: reviewed previous provider notes, discussed with case mgt, and primary Rn     Education and Discussions with Family / Patient: discussed with pt and daughter, answered all questions, denies and additional's concerns at this time    Time Spent for Care: 30 minutes  More than 50% of total time spent on counseling and coordination of care as described above  Current Length of Stay: 1 day(s)    Current Patient Status: Inpatient   Certification Statement: The patient will continue to require additional inpatient hospital stay due to monitoring off of abx, and str placment    Discharge Plan / Estimated Discharge Date: 24 hrs      Code Status: Level 1 - Full Code      Subjective:   Denies CP, chest tightness  Reports SOB with activity, overall feels okay  Is agreeable to STR    Objective:     Vitals:   Temp (24hrs), Av 2 °F (36 8 °C), Min:97 5 °F (36 4 °C), Max:98 7 °F (37 1 °C)    Temp:  [97 5 °F (36 4 °C)-98 7 °F (37 1 °C)] 97 7 °F (36 5 °C)  HR:  [75-83] 83  Resp:  [18-22] 18  BP: (109-162)/(54-79) 140/65  SpO2:  [90 %-96 %] 90 %  Body mass index is 27 42 kg/m²  Input and Output Summary (last 24 hours): Intake/Output Summary (Last 24 hours) at 2019 1611  Last data filed at 2019 0650  Gross per 24 hour   Intake 50 ml   Output 300 ml   Net -250 ml       Physical Exam:     Physical Exam   Constitutional: She is oriented to person, place, and time  She appears well-developed and well-nourished  HENT:   Head: Normocephalic  Eyes: Pupils are equal, round, and reactive to light  Neck: Normal range of motion  Cardiovascular: Normal rate  Pulmonary/Chest: Effort normal and breath sounds normal  No respiratory distress  Requiring O2   Abdominal: Soft   Bowel sounds are normal    Musculoskeletal: Normal range of motion  Neurological: She is alert and oriented to person, place, and time  Skin: Skin is warm and dry  Psychiatric: She has a normal mood and affect  Her behavior is normal  Judgment and thought content normal    Nursing note and vitals reviewed  Additional Data:     Labs:    Results from last 7 days   Lab Units 12/05/19  0414  12/04/19  1313   WBC Thousand/uL 9 46  --  9 45   HEMOGLOBIN g/dL 14 1  --  15 7*   HEMATOCRIT % 43 6  --  47 6*   PLATELETS Thousands/uL 251   < > 290   NEUTROS PCT %  --   --  85*   LYMPHS PCT %  --   --  10*   MONOS PCT %  --   --  5   EOS PCT %  --   --  0    < > = values in this interval not displayed  Results from last 7 days   Lab Units 12/05/19 0414 12/04/19  1313   POTASSIUM mmol/L 4 3 5 1   CHLORIDE mmol/L 105 101   CO2 mmol/L 24 28   BUN mg/dL 33* 35*   CREATININE mg/dL 1 07 1 23   CALCIUM mg/dL 9 3 10 7*   ALK PHOS U/L  --  148*   ALT U/L  --  25   AST U/L  --  15           * I Have Reviewed All Lab Data Listed Above  * Additional Pertinent Lab Tests Reviewed:  [facility] Admission Reviewed    Recent Cultures (last 7 days):     Results from last 7 days   Lab Units 12/04/19  2230   LEGIONELLA URINARY ANTIGEN  Negative       Last 24 Hours Medication List:     Current Facility-Administered Medications:  acetaminophen 650 mg Oral Q6H PRN OSIRIS Harvey   albuterol 2 5 mg Nebulization Q4H PRN Keira Barth MD   amLODIPine 5 mg Oral Daily OSIRIS Harvey   calcium carbonate 1,000 mg Oral Daily PRN OSIRIS Harvey   cefTRIAXone 1,000 mg Intravenous Q24H OSIRIS Harvey   docusate sodium 100 mg Oral BID OSIRIS Harvey   guaiFENesin 600 mg Oral BID OSIRIS Harvey   heparin (porcine) 5,000 Units Subcutaneous Q8H [address] OSIRIS Ferreira   insulin lispro 1-5 Units Subcutaneous 4x Daily (AC & HS) Keira Barth MD   labetalol 200 mg Oral BID OSIRIS Harvey ondansetron 4 mg Intravenous Q6H PRN OSIRIS Jewell   pravastatin 40 mg Oral Daily With Delton OSIRIS Silverio   sodium chloride (PF) 3 mL Intravenous PRN Allie Finley DO        Today, Patient Was Seen By: OSIRIS Jewell    ** Please Note: Dragon 360 Dictation voice to text software may have been used in the creation of this document   **

## 2019-12-05 NOTE — PLAN OF CARE
Problem: PHYSICAL THERAPY ADULT  Goal: Performs mobility at highest level of function for planned discharge setting  See evaluation for individualized goals  Description  Treatment/Interventions: Functional transfer training, LE strengthening/ROM, Elevations, Therapeutic exercise, Endurance training, Patient/family training, Equipment eval/education, Bed mobility, Gait training, Spoke to nursing  Equipment Recommended: Other (Comment)(TBD, continue trials with RW)       See flowsheet documentation for full assessment, interventions and recommendations  Note:   Prognosis: Good  Problem List: Decreased strength, Decreased endurance, Impaired balance, Decreased mobility  Assessment: Pt verbalized to be appropriate for PT treatment session today by RN Esperanza, and pt agreeable to PT treatment  Pt's SpO2 93% on 4L O2 via NC at rest upon session start  Trialed 2L O2 at rest, SpO2 remained > 91%  Upon exertion, pt reported SOB and SpO2 dropped to 87%  SOB improved and SpO2 increased to 91% with deep breathing exercises, rest, and increasing O2 to 4L  Pt able to ambulate 15' with min A x2 upon first ambulation trial  Pt's gait unsteady and retropulsive without AD, performed 2nd ambulation trial with RW  Education was provided for gait sequencing with RW, and pt required VCs to maintain sequencing for safety  Pt balance much improved with RW, with no retropulsion and improve foot clearance during ambulation  Pt able to ambulate 15' with RW with supervision on 4L O2  Pt reported SOB and SpO2 decreased to 85% with ambulation  SpO2 returned to 91% with rest and deep breathing exercises  Pt returned to recliner at end of session with all needs in reach with chair alarm on  At this time, pt is recommended for STR to address the above impairments and return to her prior level of function  Recommend continued PT throughout her hospital stay with emphasis on ambulation using RW to promote safe mobility at this time   Recommend mobility trials as tolerable with nursing staff to improve overall physical function  Barriers to Discharge: Inaccessible home environment, Decreased caregiver support     Recommendation: Short-term skilled PT     PT - OK to Discharge: Yes(when medically cleared; if to STR)    See flowsheet documentation for full assessment

## 2019-12-05 NOTE — PHYSICAL THERAPY NOTE
PHYSICAL THERAPY NOTE      Patient Name: Nandini Chacko  [id number] Date: 12/5/2019 12/05/19 1550   Pain Assessment   Pain Assessment No/denies pain   Pain Score No Pain   Restrictions/Precautions   Weight Bearing Precautions Per Order No   Braces or Orthoses Other (Comment)  (none at baseline per pt)   Other Precautions Fall Risk;Telemetry;O2; Chair Alarm   General   Chart Reviewed Yes   Response to Previous Treatment Patient with no complaints from previous session  Family/Caregiver Present Yes  (2 daughters and son-in-law)   Cognition   Overall Cognitive Status WFL   Arousal/Participation Alert; Cooperative   Attention Within functional limits   Orientation Level Oriented X4   Memory Within functional limits   Following Commands Follows all commands and directions without difficulty   Comments Pt agreeable to PT treatment session today  Subjective   Subjective Pt received OOB to recliner upon arrival  "I have no aches or pains"  Pt returned to recliner at end of session with all needs in reach and chair alarm engaged  Transfers   Sit to Stand 5  Supervision   Additional items Assist x 1; Armrests; Increased time required;Verbal cues  (VCs for proper hand/foot placement for safe transfer)   Stand to Sit 5  Supervision   Additional items Assist x 1; Armrests; Verbal cues; Increased time required  (VCs for proper hand/foot placement for safe transfer)   Balance   Static Sitting Fair   Dynamic Sitting [address]  (with RW)   Dynamic Standing Fair -  (with RW)   Endurance Deficit   Endurance Deficit Yes   Endurance Deficit Description Pt reported "I can feel that", referring to breathing while performing exercises today  SpO2 decreased to 87% upon standing, increased to 91% with deep breathing exercises/pursed lip breathing and rest  RPE reported 2-3/10 post exercises     Activity Tolerance   Activity Tolerance Patient limited by fatigue   Medical Staff Made Aware PT Jessica Mendez  PT Jessica Mendez spoke with [address] - requesting PT re assess pt this PM   Nurse Made Aware RN Esperanza verbalized pt appropriate for PT treatment session today and was made aware of all session outcomes/recs  Exercises   Quad Sets AROM; Bilateral;Sitting;10 reps  (required VCs and tactile cues for quad activation)   Heelslides AROM; Bilateral;Sitting;10 reps   Glute Sets AROM; Bilateral;Sitting;10 reps   Hip Abduction AROM; Bilateral;Sitting;10 reps  ((+) 10 reps in standing)   Hip Adduction AROM; Bilateral;Sitting;10 reps  (to midline)   Knee AROM Long Arc Quad AROM; Bilateral;Sitting;10 reps   Ankle Pumps AROM; Bilateral;Supine;10 reps   Marching AROM; Bilateral;Sitting;10 reps   Assessment   Prognosis Good   Problem List Decreased strength;Decreased endurance; Impaired balance;Decreased mobility; Decreased safety awareness   Assessment Pt verbalized to be appropriate for PT treatment session today by DAISY Mata, and pt agreeable to PT treatment  Pt's two daughters and son-in-law present throughout session  Pt willing to complete ther ex but declined mobility today, stating "we better hold off"  Pt on 3L O2 via NC throughout session  Pt able to complete all there ex in sitting without any pain  Pt reported mild SOB after completing 8 sitting exercises, SpO2 90%  Pt able to perform sit<>stand and stand<>sit transfers with RW with supervision x1, with VCs to ensure proper hand and foot placement for completion of transfer safely  Pt able to perform standing hip abduction with RW for B UE support, but reported SOB with SpO2 decreasing to 87%  SpO2 increased to 91% with deep breathing exercises/pursed lip breathing and rest  Reviewed pursed lip breathing with pt and encouraged her to do this whenever she experienced SOB  Pt returned to recliner at end of session with all needs in reach and chair alarm engaged  SpO2 at end of session remained > 91%   Had lengthy discussion with family regarding pt's decline at home and their concerns for her return home  History provided by family inconsistent with history from pt, as family noted pt has been experiencing SOB and fatigue upon exertion for quite some time  Pt seemed to have decreased concern for her decline in mobility  Family is concerned for pt's safe discharge  PT discussed POC, progress with PT this date, and goals to facilitate functional independence  PT Kiana Virgen tigertexted Obed Sit informing her that family wants further conversation with SLIM regarding pt's medical concerns  Family agreeable to therapy recommendations saying, "whatever you think is best"  At this time, pt is recommended for STR to address the above impairments and return to her prior level of function  Pt would benefit from continued PT throughout her hospital stay with emphasis on ambulation with RW  Recommend continued mobility trials ad mariella as tolerable with nursing to build endurance  Recommend continued review of pursed lip breathing with pt  Barriers to Discharge Inaccessible home environment;Decreased caregiver support   Goals   Patient Goals to return home   STG Expiration Date 12/15/19   PT Treatment Day 2   Plan   Treatment/Interventions Functional transfer training;LE strengthening/ROM; Elevations; Therapeutic exercise; Endurance training;Patient/family training;Equipment eval/education; Bed mobility;Gait training;Spoke to nursing   Progress Progressing toward goals   PT Frequency Other (Comment)  (3-5x/wk)   Recommendation   Recommendation Short-term skilled PT   Equipment Recommended Other (Comment)  (TBD, continue trials with RW)   PT - OK to Discharge Yes  (when medically cleared; if to STR)     Marilyn Narvaez, SPT

## 2019-12-05 NOTE — RESPIRATORY THERAPY NOTE
RT Protocol Note  Alina Terrell 80 y o  female MRN: [id number]  Unit/Bed#: -01 Encounter: [id number]    Assessment    Principal Problem:    SOB (shortness of breath)  Active Problems:    Hyperlipidemia    Hypertension    Type 2 diabetes mellitus with hyperglycemia ([facility] )    Community acquired pneumonia    Elevated troponin    Acute kidney injury [facility])      Home Pulmonary Medications:  none       Past Medical History:   Diagnosis Date    Diabetes mellitus ([facility] )     Hematuria     3OCT2017 RESOLVED    Hypertension      Social History     Socioeconomic History    Marital status:       Spouse name: None    Number of children: None    Years of education: None    Highest education level: None   Occupational History    None   Social Needs    Financial resource strain: None    Food insecurity:     Worry: None     Inability: None    Transportation needs:     Medical: None     Non-medical: None   Tobacco Use    Smoking status: Never Smoker    Smokeless tobacco: Never Used   Substance and Sexual Activity    Alcohol use: No     Frequency: Never     Binge frequency: Never    Drug use: No    Sexual activity: Not Currently     Partners: Male   Lifestyle    Physical activity:     Days per week: None     Minutes per session: None    Stress: None   Relationships    Social connections:     Talks on phone: None     Gets together: None     Attends Hoahaoism service: None     Active member of club or organization: None     Attends meetings of clubs or organizations: None     Relationship status: None    Intimate partner violence:     Fear of current or ex partner: None     Emotionally abused: None     Physically abused: None     Forced sexual activity: None   Other Topics Concern    None   Social History Narrative    CAFFEINE USE    EXERCISE REGULARLY       Subjective         Objective    Physical Exam:   Assessment Type: Assess only  General Appearance: Alert, Awake  Respiratory Pattern: Normal  Chest Assessment: Chest expansion symmetrical  Bilateral Breath Sounds: Clear, Diminished    Vitals:  Blood pressure 162/79, pulse 78, temperature 97 5 °F (36 4 °C), temperature source Oral, resp  rate 19, height 5' 2" (1 575 m), SpO2 92 %  Imaging and other studies: I have personally reviewed pertinent films in PACS          Plan    Respiratory Plan: No distress/Pulmonary history        Resp Comments: pt denies any pulmonary history or the use of any pulmonary medications at home, pt is currently on [facility] and eating with no distress   Pt has no adverse breath sounds, no indications for scheduled txs at this time,

## 2019-12-05 NOTE — PLAN OF CARE
Problem: Potential for Falls  Goal: Patient will remain free of falls  Description  INTERVENTIONS:  - Assess patient frequently for physical needs  -  Identify cognitive and physical deficits and behaviors that affect risk of falls    -  [place] fall precautions as indicated by assessment   - Educate patient/family on patient safety including physical limitations  - Instruct patient to call for assistance with activity based on assessment  - Modify environment to reduce risk of injury  - Consider OT/PT consult to assist with strengthening/mobility  Outcome: Progressing     Problem: PAIN - ADULT  Goal: Verbalizes/displays adequate comfort level or baseline comfort level  Description  Interventions:  - Encourage patient to monitor pain and request assistance  - Assess pain using appropriate pain scale  - Administer analgesics based on type and severity of pain and evaluate response  - Implement non-pharmacological measures as appropriate and evaluate response  - Consider cultural and social influences on pain and pain management  - Notify physician/advanced practitioner if interventions unsuccessful or patient reports new pain  Outcome: Progressing     Problem: INFECTION - ADULT  Goal: Absence or prevention of progression during hospitalization  Description  INTERVENTIONS:  - Assess and monitor for signs and symptoms of infection  - Monitor lab/diagnostic results  - Monitor all insertion sites, i e  indwelling lines, tubes, and drains  - Monitor endotracheal if appropriate and nasal secretions for changes in amount and color  - [place] appropriate cooling/warming therapies per order  - Administer medications as ordered  - Instruct and encourage patient and family to use good hand hygiene technique  - Identify and instruct in appropriate isolation precautions for identified infection/condition  Outcome: Progressing     Problem: SAFETY ADULT  Goal: Maintain or return to baseline ADL function  Description  INTERVENTIONS:  -  Assess patient's ability to carry out ADLs; assess patient's baseline for ADL function and identify physical deficits which impact ability to perform ADLs (bathing, care of mouth/teeth, toileting, grooming, dressing, etc )  - Assess/evaluate cause of self-care deficits   - Assess range of motion  - Assess patient's mobility; develop plan if impaired  - Assess patient's need for assistive devices and provide as appropriate  - Encourage maximum independence but intervene and supervise when necessary  - Involve family in performance of ADLs  - Assess for home care needs following discharge   - Consider OT consult to assist with ADL evaluation and planning for discharge  - Provide patient education as appropriate  Outcome: Progressing  Goal: Maintain or return mobility status to optimal level  Description  INTERVENTIONS:  - Assess patient's baseline mobility status (ambulation, transfers, stairs, etc )    - Identify cognitive and physical deficits and behaviors that affect mobility  - Identify mobility aids required to assist with transfers and/or ambulation (gait belt, sit-to-stand, lift, walker, cane, etc )  - [place] fall precautions as indicated by assessment  - Record patient progress and toleration of activity level on Mobility SBAR; progress patient to next Phase/Stage  - Instruct patient to call for assistance with activity based on assessment  - Consider rehabilitation consult to assist with strengthening/weightbearing, etc   Outcome: Progressing     Problem: DISCHARGE PLANNING  Goal: Discharge to home or other facility with appropriate resources  Description  INTERVENTIONS:  - Identify barriers to discharge w/patient and caregiver  - Arrange for needed discharge resources and transportation as appropriate  - Identify discharge learning needs (meds, wound care, etc )  - Arrange for interpretive services to assist at discharge as needed  - Refer to Case Management Department for coordinating discharge planning if the patient needs post-hospital services based on physician/advanced practitioner order or complex needs related to functional status, cognitive ability, or social support system  Outcome: Progressing     Problem: Knowledge Deficit  Goal: Patient/family/caregiver demonstrates understanding of disease process, treatment plan, medications, and discharge instructions  Description  Complete learning assessment and assess knowledge base    Interventions:  - Provide teaching at level of understanding  - Provide teaching via preferred learning methods  Outcome: Progressing     Problem: CARDIOVASCULAR - ADULT  Goal: Maintains optimal cardiac output and hemodynamic stability  Description  INTERVENTIONS:  - Monitor I/O, vital signs and rhythm  - Monitor for S/S and trends of decreased cardiac output  - Administer and titrate ordered vasoactive medications to optimize hemodynamic stability  - Assess quality of pulses, skin color and temperature  - Assess for signs of decreased coronary artery perfusion  - Instruct patient to report change in severity of symptoms  Outcome: Progressing     Problem: RESPIRATORY - ADULT  Goal: Achieves optimal ventilation and oxygenation  Description  INTERVENTIONS:  - Assess for changes in respiratory status  - Assess for changes in mentation and behavior  - Position to facilitate oxygenation and minimize respiratory effort  - Oxygen administered by appropriate delivery if ordered  - Initiate smoking cessation education as indicated  - Encourage broncho-pulmonary hygiene including cough, deep breathe, Incentive Spirometry  - Assess the need for suctioning and aspirate as needed  - Assess and instruct to report SOB or any respiratory difficulty  - Respiratory Therapy support as indicated  Outcome: Progressing

## 2019-12-05 NOTE — OCCUPATIONAL THERAPY NOTE
Occupational Therapy Evaluation        Patient Name: Nick Shrestha  HIFWL'C Date: 12/5/2019 12/05/19 0820   Note Type   Note type Eval only   Restrictions/Precautions   Weight Bearing Precautions Per Order No   Braces or Orthoses Other (Comment)  (None at baseline)   Other Precautions Fall Risk;O2   Pain Assessment   Pain Assessment No/denies pain   Pain Score No Pain   Home Living   Type of [address] Other (Comment); One level; Laundry in basement; Work area in basement  (no PRINCE, enters thru the [employer]- ground level)   Bathroom Shower/Tub Walk-in shower  (basemant level)   Bathroom Toilet Standard   Bathroom Equipment Other (Comment)  (No DME at [facility])   [address] Other (Comment); Wheelchair-manual;Walker;Cane  (available, not using at baseline)   Additional Comments patient drives   Prior Function   Level of [place] Independent with ADLs and functional mobility   Lives With Alone   Receives Help From Family  (2 daughters live locally)   ADL Assistance Independent   IADLs Independent   Falls in the last 6 months 0   Vocational Retired   Comments 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   Lifestyle   Autonomy Patient reported independent with ADLs, IADLs  Patient lives alone in a 2 story house with no PRINCE, enters thru the basemant with i full flight to bedroom  Patient ambulates with no AD, 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  Patient drives and amanges her affairs indepependnetly, daughters and grand children are very supportive     Reciprocal Relationships Supportive Family    Psychosocial   Psychosocial (WDL) WDL   ADL   Eating Assistance 5  Supervision/Setup   Grooming Assistance 5  Supervision/Setup   UB Bathing Assistance 5  Supervision/Setup   LB Bathing Assistance 3  Moderate Assistance   UB Dressing Assistance 4  Minimal [address] 3  Moderate Assistance   Toileting Assistance  3  Moderate Assistance   Functional Assistance 3  Moderate Assistance   Additional Comments patient complained of feeling out of breath " winded " after walking to bathroom  O2 was kept at 4 liters thru NC, patient was also instructed on pursed lip breathing exercises  Bed Mobility   Rolling R 5  Supervision   Additional items Assist x 1;HOB elevated; Increased time required;Verbal cues;LE management   Supine to Sit 5  Supervision   Additional items Assist x 1;HOB elevated; Increased time required; Bedrails;Verbal cues;LE management   Transfers   Stand to Sit 5  Supervision   Additional items Assist x 1; Increased time required;Verbal cues   Functional Mobility   Functional Mobility 4  Minimal assistance   Additional items Rolling walker  (RW issued during this session )   Balance   Static Sitting Fair   Dynamic Sitting Fair -   Static Standing Fair  (with RW)   Dynamic Standing [address] -  (with RW)   Activity Tolerance   Activity Tolerance Patient limited by fatigue   Nurse Made Aware RN Esperanza verbalized pt appropriate for PT treatment session today and was made aware of all session outcomes/recs  RUE Assessment   RUE Assessment WFL   LUE Assessment   LUE Assessment WFL   Hand Function   Gross Motor Coordination Impaired   Fine Motor Coordination Impaired   Sensation   Light Touch No apparent deficits  (BUEs)   Vision-Basic Assessment   Current Vision Wears glasses only for reading   Patient Visual Report Other (Comment)  (no significant changes reported)   Cognition   Overall Cognitive Status WFL   Arousal/Participation Alert; Cooperative;Responsive   Attention Within functional limits   Orientation Level Oriented X4   Memory Within functional limits   Following Commands Follows all commands and directions without difficulty   Assessment   Limitation Decreased ADL status; Decreased endurance;Decreased self-care trans;Decreased high-level ADLs   Prognosis Good   Assessment Patient is a 80 y o  female seen for OT evaluation s/p admit to [id number] [facility] on 12/4/2019 w/SOB (shortness of breath)  Commorbidities affecting patient's functional performance at time of assessment include: Community acquired Pneumonia, Acute kidney injury, elevated troponin, type 2 DM with hyperglycemia  Patient presented to ED with worsening dyspnea with exertion over the last several weeks  Orders placed for OT evaluation and treatment  Performed at least two patient identifiers during session including name and wristband  Prior to admission, Patient reported independent with ADLs, IADLs  Patient lives alone in a 2 story house with no PRINCE, enters thru the basemant with i full flight to bedroom  Patient ambulates with no AD, 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  Patient drives and amanges her affairs indepependnetly, daughters and grand children are very supportive  Personal factors affecting patient at time of initial evaluation include: limited caregiver support, steps to enter, decreased initiation and engagement, difficulty performing ADLs and difficulty performing IADLs  Upon evaluation, patient requires supervision, set up and contact guard assist for UB ADLs, minimal  assist for LB ADLs, transfers and functional ambulation in room and bathroom with minimal  assist, with the use of Rolling Walker  Occupational performance is affected by the following deficits: degenerative arthritic joint changes, impaired gross motor coordination, dynamic sit/ stand balance deficit with poor standing tolerance time for self care and functional mobility, decreased activity tolerance and postural control and postural alignment deficit, requiring external assistance to complete transitional movements   Therapist completed  extensive additional review of medical records and additional review of physical, cognitive or psychosocial history, clinical examination identifying 5 or more performance deficits, clinical decision making of a high complexity , consistent with a high complexity level evaluation  Patient to benefit from continued Occupational Therapy treatment while in the hospital to address deficits as defined above and maximize level of functional independence with ADLs and functional mobility  Goals   Patient Goals to return home   Plan   Treatment Interventions ADL retraining;Functional transfer training;UE strengthening/ROM; Endurance training;Patient/family training; Compensatory technique education;Continued evaluation; Energy conservation; Activityengagement   Goal Expiration Date 12/19/19   OT Frequency 3-5x/wk   Recommendation   OT Discharge Recommendation Short Term Rehab   B[place]hel Index   Feeding 10   Bathing 0   Grooming Score 0   Dressing Score 5   Bladder Score 10   Bowels Score 10   Toilet Use Score 5   Transfers (Bed/Chair) Score 10   Mobility (Level Surface) Score 0   Stairs Score 0   B[place] Index Score 50   Modified [place] Scale   Modified [place] Scale 4       Occupational Performance areas to address include: grooming , bathing/ shower, dressing, toilet hygiene, transfer to all surfaces, functional ambulation, functional mobility, community mobility, health maintenance, IADLS: Household maintenance, IADLs: safety procedures, IADLs: meal prep/ clean up, Leisure Participation, Social participation and Home management  From OT standpoint, recommendation at time of d/c would be Short Term Rehab  Occupational therapy Goals: In 2-4 days    1- Patient will verbalize and demonstrate use of energy conservation/ deep breathing technique and work simplification skills during functional activity with no verbal cues    2- Patient will verbalize and demonstrate good body mechanics and joint protection techniques during ADLs/ IADLs with no verbal cues   3- Patient will increase OOB/ sitting tolerance to 4-6 hours per day for increased participation in self care and leisure tasks with no s/s of exertion  4- Patient will identify s/s of exertion during ADL and functional mobility with no verbal cues    5-Patient will verbalize/ demonstrate compensatory strategies to recover from exertion with no verbal cues  6-Patient will increase standing tolerance time to 10 minutes with No UE support to complete sink level ADLs @ Mod I level   7- Patient will increase sitting tolerance at edge of bed to 30 minutes to complete UB ADLs @ Indep  level     8-- Patient/ Family will demonstrate competency with UE Home Exercise Program

## 2019-12-06 PROBLEM — R93.89 ABNORMAL FINDING ON CT SCAN: Status: ACTIVE | Noted: 2019-01-01

## 2019-12-06 NOTE — SOCIAL WORK
LOS 2 GMLOS 3 2  CM received a phone call from Nani [phone] who informed patient lives alone  In a two story house  There are 14 stairs to the main level  Patient was independent with ADLs  Patient does not have rehab hx and hhc hx  Elaina Madi states they had a family discussion last night and patient is open to going to [facility]  Patient's choice is [facility]  A post acute care recommendation was made by your care team for [facility]  Discussed Freedom of Choice with caregiver  List of agencies given to caregiver via in person  caregiver aware the list is custom filtered for them by preference  and that [facility] post acute providers are designated  Referral is sent to [place]  Upon clearance for discharge patient will need gurney transport to [facility]  Nurse and SLIM notified  CM reviewed discharge planning process including the following: identifying help at home, patient preference for discharge planning needs, pharmacy preference, and availability of treatment team to discuss questions or concerns patient and/or family may have regarding understanding medications and recognizing signs and symptoms once discharged  CM also encouraged patient to follow up with all recommended appointments after discharge  Patient advised of importance for patient and family to participate in managing patients medical well being

## 2019-12-06 NOTE — PLAN OF CARE
Problem: Potential for Falls  Goal: Patient will remain free of falls  Description  INTERVENTIONS:  - Assess patient frequently for physical needs  -  Identify cognitive and physical deficits and behaviors that affect risk of falls    -  [place] fall precautions as indicated by assessment   - Educate patient/family on patient safety including physical limitations  - Instruct patient to call for assistance with activity based on assessment  - Modify environment to reduce risk of injury  - Consider OT/PT consult to assist with strengthening/mobility  Outcome: Progressing     Problem: PAIN - ADULT  Goal: Verbalizes/displays adequate comfort level or baseline comfort level  Description  Interventions:  - Encourage patient to monitor pain and request assistance  - Assess pain using appropriate pain scale  - Administer analgesics based on type and severity of pain and evaluate response  - Implement non-pharmacological measures as appropriate and evaluate response  - Consider cultural and social influences on pain and pain management  - Notify physician/advanced practitioner if interventions unsuccessful or patient reports new pain  Outcome: Progressing     Problem: INFECTION - ADULT  Goal: Absence or prevention of progression during hospitalization  Description  INTERVENTIONS:  - Assess and monitor for signs and symptoms of infection  - Monitor lab/diagnostic results  - Monitor all insertion sites, i e  indwelling lines, tubes, and drains  - Monitor endotracheal if appropriate and nasal secretions for changes in amount and color  - [place] appropriate cooling/warming therapies per order  - Administer medications as ordered  - Instruct and encourage patient and family to use good hand hygiene technique  - Identify and instruct in appropriate isolation precautions for identified infection/condition  Outcome: Progressing     Problem: SAFETY ADULT  Goal: Maintain or return to baseline ADL function  Description  INTERVENTIONS:  -  Assess patient's ability to carry out ADLs; assess patient's baseline for ADL function and identify physical deficits which impact ability to perform ADLs (bathing, care of mouth/teeth, toileting, grooming, dressing, etc )  - Assess/evaluate cause of self-care deficits   - Assess range of motion  - Assess patient's mobility; develop plan if impaired  - Assess patient's need for assistive devices and provide as appropriate  - Encourage maximum independence but intervene and supervise when necessary  - Involve family in performance of ADLs  - Assess for home care needs following discharge   - Consider OT consult to assist with ADL evaluation and planning for discharge  - Provide patient education as appropriate  Outcome: Progressing  Goal: Maintain or return mobility status to optimal level  Description  INTERVENTIONS:  - Assess patient's baseline mobility status (ambulation, transfers, stairs, etc )    - Identify cognitive and physical deficits and behaviors that affect mobility  - Identify mobility aids required to assist with transfers and/or ambulation (gait belt, sit-to-stand, lift, walker, cane, etc )  - [place] fall precautions as indicated by assessment  - Record patient progress and toleration of activity level on Mobility SBAR; progress patient to next Phase/Stage  - Instruct patient to call for assistance with activity based on assessment  - Consider rehabilitation consult to assist with strengthening/weightbearing, etc   Outcome: Progressing     Problem: DISCHARGE PLANNING  Goal: Discharge to home or other facility with appropriate resources  Description  INTERVENTIONS:  - Identify barriers to discharge w/patient and caregiver  - Arrange for needed discharge resources and transportation as appropriate  - Identify discharge learning needs (meds, wound care, etc )  - Arrange for interpretive services to assist at discharge as needed  - Refer to Case Management Department for coordinating discharge planning if the patient needs post-hospital services based on physician/advanced practitioner order or complex needs related to functional status, cognitive ability, or social support system  Outcome: Progressing     Problem: Knowledge Deficit  Goal: Patient/family/caregiver demonstrates understanding of disease process, treatment plan, medications, and discharge instructions  Description  Complete learning assessment and assess knowledge base    Interventions:  - Provide teaching at level of understanding  - Provide teaching via preferred learning methods  Outcome: Progressing     Problem: CARDIOVASCULAR - ADULT  Goal: Maintains optimal cardiac output and hemodynamic stability  Description  INTERVENTIONS:  - Monitor I/O, vital signs and rhythm  - Monitor for S/S and trends of decreased cardiac output  - Administer and titrate ordered vasoactive medications to optimize hemodynamic stability  - Assess quality of pulses, skin color and temperature  - Assess for signs of decreased coronary artery perfusion  - Instruct patient to report change in severity of symptoms  Outcome: Progressing     Problem: RESPIRATORY - ADULT  Goal: Achieves optimal ventilation and oxygenation  Description  INTERVENTIONS:  - Assess for changes in respiratory status  - Assess for changes in mentation and behavior  - Position to facilitate oxygenation and minimize respiratory effort  - Oxygen administered by appropriate delivery if ordered  - Initiate smoking cessation education as indicated  - Encourage broncho-pulmonary hygiene including cough, deep breathe, Incentive Spirometry  - Assess the need for suctioning and aspirate as needed  - Assess and instruct to report SOB or any respiratory difficulty  - Respiratory Therapy support as indicated  Outcome: Progressing

## 2019-12-07 NOTE — ASSESSMENT & PLAN NOTE
· POA, cr  1 23  · Resolved with fluids, currently back to baseline at 0 98  · Spirinolactone was held due to GERMAN, will resume at lower dose of 12 5 mg daily and monitor volume status

## 2019-12-07 NOTE — PLAN OF CARE
Problem: Potential for Falls  Goal: Patient will remain free of falls  Description  INTERVENTIONS:  - Assess patient frequently for physical needs  -  Identify cognitive and physical deficits and behaviors that affect risk of falls    -  [place] fall precautions as indicated by assessment   - Educate patient/family on patient safety including physical limitations  - Instruct patient to call for assistance with activity based on assessment  - Modify environment to reduce risk of injury  - Consider OT/PT consult to assist with strengthening/mobility  Outcome: Progressing     Problem: PAIN - ADULT  Goal: Verbalizes/displays adequate comfort level or baseline comfort level  Description  Interventions:  - Encourage patient to monitor pain and request assistance  - Assess pain using appropriate pain scale  - Administer analgesics based on type and severity of pain and evaluate response  - Implement non-pharmacological measures as appropriate and evaluate response  - Consider cultural and social influences on pain and pain management  - Notify physician/advanced practitioner if interventions unsuccessful or patient reports new pain  Outcome: Progressing     Problem: INFECTION - ADULT  Goal: Absence or prevention of progression during hospitalization  Description  INTERVENTIONS:  - Assess and monitor for signs and symptoms of infection  - Monitor lab/diagnostic results  - Monitor all insertion sites, i e  indwelling lines, tubes, and drains  - Monitor endotracheal if appropriate and nasal secretions for changes in amount and color  - [place] appropriate cooling/warming therapies per order  - Administer medications as ordered  - Instruct and encourage patient and family to use good hand hygiene technique  - Identify and instruct in appropriate isolation precautions for identified infection/condition  Outcome: Progressing     Problem: SAFETY ADULT  Goal: Maintain or return to baseline ADL function  Description  INTERVENTIONS:  -  Assess patient's ability to carry out ADLs; assess patient's baseline for ADL function and identify physical deficits which impact ability to perform ADLs (bathing, care of mouth/teeth, toileting, grooming, dressing, etc )  - Assess/evaluate cause of self-care deficits   - Assess range of motion  - Assess patient's mobility; develop plan if impaired  - Assess patient's need for assistive devices and provide as appropriate  - Encourage maximum independence but intervene and supervise when necessary  - Involve family in performance of ADLs  - Assess for home care needs following discharge   - Consider OT consult to assist with ADL evaluation and planning for discharge  - Provide patient education as appropriate  Outcome: Progressing  Goal: Maintain or return mobility status to optimal level  Description  INTERVENTIONS:  - Assess patient's baseline mobility status (ambulation, transfers, stairs, etc )    - Identify cognitive and physical deficits and behaviors that affect mobility  - Identify mobility aids required to assist with transfers and/or ambulation (gait belt, sit-to-stand, lift, walker, cane, etc )  - [place] fall precautions as indicated by assessment  - Record patient progress and toleration of activity level on Mobility SBAR; progress patient to next Phase/Stage  - Instruct patient to call for assistance with activity based on assessment  - Consider rehabilitation consult to assist with strengthening/weightbearing, etc   Outcome: Progressing     Problem: DISCHARGE PLANNING  Goal: Discharge to home or other facility with appropriate resources  Description  INTERVENTIONS:  - Identify barriers to discharge w/patient and caregiver  - Arrange for needed discharge resources and transportation as appropriate  - Identify discharge learning needs (meds, wound care, etc )  - Arrange for interpretive services to assist at discharge as needed  - Refer to Case Management Department for coordinating discharge planning if the patient needs post-hospital services based on physician/advanced practitioner order or complex needs related to functional status, cognitive ability, or social support system  Outcome: Progressing     Problem: Knowledge Deficit  Goal: Patient/family/caregiver demonstrates understanding of disease process, treatment plan, medications, and discharge instructions  Description  Complete learning assessment and assess knowledge base    Interventions:  - Provide teaching at level of understanding  - Provide teaching via preferred learning methods  Outcome: Progressing     Problem: CARDIOVASCULAR - ADULT  Goal: Maintains optimal cardiac output and hemodynamic stability  Description  INTERVENTIONS:  - Monitor I/O, vital signs and rhythm  - Monitor for S/S and trends of decreased cardiac output  - Administer and titrate ordered vasoactive medications to optimize hemodynamic stability  - Assess quality of pulses, skin color and temperature  - Assess for signs of decreased coronary artery perfusion  - Instruct patient to report change in severity of symptoms  Outcome: Progressing     Problem: RESPIRATORY - ADULT  Goal: Achieves optimal ventilation and oxygenation  Description  INTERVENTIONS:  - Assess for changes in respiratory status  - Assess for changes in mentation and behavior  - Position to facilitate oxygenation and minimize respiratory effort  - Oxygen administered by appropriate delivery if ordered  - Initiate smoking cessation education as indicated  - Encourage broncho-pulmonary hygiene including cough, deep breathe, Incentive Spirometry  - Assess the need for suctioning and aspirate as needed  - Assess and instruct to report SOB or any respiratory difficulty  - Respiratory Therapy support as indicated  Outcome: Progressing

## 2019-12-07 NOTE — ASSESSMENT & PLAN NOTE
· Lung lesion noted on the left lower lobe with 13 mm solid component, will need repeat CT scan in 6-8 weeks to exclude neoplastic process  · Left thyroid mass noted with enlargement of the left thyroid lobe, will need follow up with non-urgent ultrasound   · Left adrenal enlargement, outpatient MRI with adrenal protocol recommended

## 2019-12-07 NOTE — ASSESSMENT & PLAN NOTE
Lab Results   Component Value Date    HGBA1C 7 0 (H) 12/04/2019       Recent Labs     12/05/19  2031 12/06/19  0609 12/06/19  1143 12/06/19  1650   POCGLU 238* 139 197* 212*       Blood Sugar Average: Last 72 hrs:  (P) 193 875   · Appears fairly well controlled as an outpatient as noted by hgbA1c  · Hold glimepiride while inpatient   · Continue SSI coverage, will increase algorithm and add bedtime coverage   · QID glucose checks  · Monitor

## 2019-12-07 NOTE — ASSESSMENT & PLAN NOTE
· Has been experiencing shortness of breath over the past few weeks  · CT scan likely (+) for pneumonia, continue IV abx treatment   · Resume home dose of spirinolactone, pt does not appear to be overtly overloaded   · If no improvement, consider ECHO   · Continue supplemental O2, pt currently requiring 3 5 L with saturations in the 90s, monitor closely   · Will need home O2 eval on discharge

## 2019-12-07 NOTE — PROGRESS NOTES
Progress Note - Leny Terrell 5/4/1928, 80 y o  female MRN: [id number]    Unit/Bed#: -01 Encounter: [id number]    Primary Care Provider: Katie Church DO   Date and time admitted to hospital: 12/4/2019 12:27 PM      DOS: 12/6/2019    * SOB (shortness of breath)  Assessment & Plan  · Has been experiencing shortness of breath over the past few weeks  · CT scan likely (+) for pneumonia, continue IV abx treatment   · Resume home dose of spirinolactone, pt does not appear to be overtly overloaded   · If no improvement, consider ECHO   · Continue supplemental O2, pt currently requiring 3 5 L with saturations in the 90s, monitor closely   · Will need home O2 eval on discharge     Community acquired pneumonia  Assessment & Plan  · Evidence on chest x-ray and chest CT  · Continue IV Ceftriaxone (day 3)  · Strep pneumo and legionella negative, therefore azithromycin was discontinued as patient also with mild prolonged QTc on EKG  · Consider addition of flouroquinolone if continued decline in respiratory status   · Pt is requiring 3 5 L of oxygen currently with O2 sats in the low 90s  Is not on oxygen at home  Will need home O2 eval once stable for d/c  · Procalcitonin x 2 negative, however due to continued shortness of breath and worsening hypoxia, continue abx at this time  · Sputum culture not sent   · Influenza swab negative, will obtain respiratory allergen panel   · Mucinex, pulmonary toileting  · If pt continues to have more oxygen demand, consider obtaining ECHO, V/Q scan, however low susceptibility for PE at this time   Likely worsening respiratory status in setting of acute pneumonia    Abnormal finding on CT scan  Assessment & Plan  · Lung lesion noted on the left lower lobe with 13 mm solid component, will need repeat CT scan in 6-8 weeks to exclude neoplastic process  · Left thyroid mass noted with enlargement of the left thyroid lobe, will need follow up with non-urgent ultrasound   · Left adrenal enlargement, outpatient MRI with adrenal protocol recommended    Acute kidney injury ([facility] [address] )  Assessment & Plan  · POA, cr  1 23  · Resolved with holding diuretic, currently back to baseline at 0 98  · Spirinolactone was held due to GERMAN, will resume at lower dose of 12 5 mg daily and monitor volume status    Elevated troponin  Assessment & Plan  · Potentially in the setting of acute illness vs  GERMAN vs  Age   · Trop trending of 0 42/0 42/0 31/0 29, flatly decreased   · Pt denies any chest pain  · Shortness of breath likely secondary to acute pneumonia   · Consider obtaining ECHO if respiratory status does not improve  · Monitor    Type 2 diabetes mellitus with hyperglycemia [facility])  Assessment & Plan  Lab Results   Component Value Date    HGBA1C 7 0 (H) 12/04/2019       Recent Labs     12/05/19  2031 12/06/19  0609 12/06/19  1143 12/06/19  1650   POCGLU 238* 139 197* 212*       Blood Sugar Average: Last 72 hrs:  (P) 193 875   · Appears fairly well controlled as an outpatient as noted by hgbA1c  · Hold glimepiride while inpatient   · Continue SSI coverage, will increase algorithm and add bedtime coverage   · QID glucose checks  · Monitor     Hypertension  Assessment & Plan  · Appears controlled on review  · Continue amlodipine 5 mg daily, labetalol 200 mg BID  · Resume spirinolactone at 12 5 mg daily and monitor volume status  · Monitor     Hyperlipidemia  Assessment & Plan  · Continue statin    VTE Pharmacologic Prophylaxis:   Pharmacologic: Heparin  Mechanical VTE Prophylaxis in Place: Yes    Patient Centered Rounds: I have performed bedside rounds with nursing staff today  Discussions with Specialists or Other Care Team Provider: Discussed with RN, CM and reviewed previous notes     Education and Discussions with Family / Patient: Discussed with patient at bedside and patient's daughter Cassidy Armenta over the phone per patient request      Time Spent for Care: 30 minutes    More than 50% of total time spent on counseling and coordination of care as described above  Current Length of Stay: 2 day(s)    Current Patient Status: Inpatient   Certification Statement: The patient will continue to require additional inpatient hospital stay due to due to continued respiratory decline, requiring more O2    Discharge Plan: Not medically stable as above  Pt agreeable to STR once stable for discharge  Code Status: Level 1 - Full Code      Subjective:   Pt reports that she feels okay, states that her breathing feels mildly improved but continues to be worse with movement  Currently denies any chest pain, abdominal pain, nausea, vomiting, diarrhea, constipation or urinary difficulties  Objective:     Vitals:   Temp (24hrs), Av 7 °F (36 5 °C), Min:97 5 °F (36 4 °C), Max:97 9 °F (36 6 °C)    Temp:  [97 5 °F (36 4 °C)-97 9 °F (36 6 °C)] 97 5 °F (36 4 °C)  HR:  [78-92] 80  Resp:  [16-18] 16  BP: (119-146)/(59-64) 119/60  SpO2:  [86 %-92 %] 90 %  Body mass index is 27 42 kg/m²  Input and Output Summary (last 24 hours): Intake/Output Summary (Last 24 hours) at 2019 1939  Last data filed at 2019 1904  Gross per 24 hour   Intake 180 ml   Output    Net 180 ml       Physical Exam:     Physical Exam   Constitutional: No distress  Pt is in no acute distress, sitting up in her hospital chair resting comfortably  NC in place on 3 5 L, O2 in the 90s   Does have some mild conversation dyspnea with long conversation  Pleasant and cooperative  HENT:   Head: Normocephalic and atraumatic  Eyes: Pupils are equal, round, and reactive to light  Conjunctivae are normal    Cardiovascular: Normal rate, regular rhythm and intact distal pulses  Pulmonary/Chest: Effort normal and breath sounds normal  No stridor  No respiratory distress  She has no wheezes  Abdominal: Soft  Bowel sounds are normal  She exhibits no distension  There is no tenderness  There is no guarding  Musculoskeletal: She exhibits no edema     Appears euvolemic Neurological: She is alert  Skin: Skin is warm and dry  She is not diaphoretic  No erythema  Psychiatric: She has a normal mood and affect  Vitals reviewed  Additional Data:     Labs:    Results from last 7 days   Lab Units 12/06/19  0500  12/04/19  1313   WBC Thousand/uL 9 20   < > 9 45   HEMOGLOBIN g/dL 14 0   < > 15 7*   HEMATOCRIT % 42 5   < > 47 6*   PLATELETS Thousands/uL 269   < > 290   NEUTROS PCT %  --   --  85*   LYMPHS PCT %  --   --  10*   MONOS PCT %  --   --  5   EOS PCT %  --   --  0    < > = values in this interval not displayed  Results from last 7 days   Lab Units 12/06/19  0500  12/04/19  1313   POTASSIUM mmol/L 4 1   < > 5 1   CHLORIDE mmol/L 106   < > 101   CO2 mmol/L 23   < > 28   BUN mg/dL 33*   < > 35*   CREATININE mg/dL 0 98   < > 1 23   CALCIUM mg/dL 9 4   < > 10 7*   ALK PHOS U/L  --   --  148*   ALT U/L  --   --  25   AST U/L  --   --  15    < > = values in this interval not displayed  * I Have Reviewed All Lab Data Listed Above  * Additional Pertinent Lab Tests Reviewed:  All Labs Within Last 24 Hours Reviewed    Imaging:    Imaging Reports Reviewed Today Include: CT chest, CXR  Imaging Personally Reviewed by Myself Includes:  None    Recent Cultures (last 7 days):     Results from last 7 days   Lab Units 12/04/19  2230   LEGIONELLA URINARY ANTIGEN  Negative       Last 24 Hours Medication List:     Current Facility-Administered Medications:  acetaminophen 650 mg Oral Q6H PRN Aries Lick, CRNP    albuterol 2 5 mg Nebulization Q4H PRN Matt Brandon MD    amLODIPine 5 mg Oral Daily Aries Lick, CRNP    calcium carbonate 1,000 mg Oral Daily PRN Aries Lick, CRNP    cefTRIAXone 1,000 mg Intravenous Q24H Aries Lick, CRNP Last Rate: 1,000 mg (12/06/19 1793)   docusate sodium 100 mg Oral BID Aries Lick, CRNP    guaiFENesin 600 mg Oral BID Aries Lick, CRNP    heparin (porcine) 5,000 Units Subcutaneous Q8H [address] Malen Gaw insulin lispro 1-5 Units Subcutaneous HS Lisa Ward PA-C    [START ON 12/7/2019] insulin lispro 1-6 Units Subcutaneous TID AC Xochilt Kauffman PA-C    labetalol 200 mg Oral BID Dorothy Cadet, CRNP    ondansetron 4 mg Intravenous Q6H PRN Dorothy Cadet, CRNP    pravastatin 40 mg Oral Daily With [employer], CRNP    sodium chloride (PF) 3 mL Intravenous PRN Author DO Dilshad    spironolactone 12 5 mg Oral Daily Lisa Ward PA-C         Today, Patient Was Seen By: Lisa Ward PA-C    ** Please Note: Dictation voice to text software may have been used in the creation of this document   **

## 2019-12-07 NOTE — ASSESSMENT & PLAN NOTE
· Potentially in the setting of acute illness vs  GERMAN vs  Age   · Trop trending of 0 42/0 42/0 31/0 29, flatly decreased   · Pt denies any chest pain  · Shortness of breath likely secondary to acute pneumonia   · Consider obtaining ECHO if respiratory status does not improve  · Monitor

## 2019-12-07 NOTE — ASSESSMENT & PLAN NOTE
· Appears controlled on review  · Continue amlodipine 5 mg daily, labetalol 200 mg BID  · Resume spirinolactone at 12 5 mg daily and monitor volume status  · Monitor

## 2019-12-08 PROBLEM — I26.99 ACUTE PULMONARY EMBOLISM (HCC): Status: ACTIVE | Noted: 2019-01-01

## 2019-12-08 PROBLEM — E27.8 ADRENAL NODULE (HCC): Status: ACTIVE | Noted: 2019-01-01

## 2019-12-08 PROBLEM — I82.B19 SUBCLAVIAN VEIN THROMBOEMBOLISM, ACUTE (HCC): Status: ACTIVE | Noted: 2019-01-01

## 2019-12-08 NOTE — RAPID RESPONSE
Progress Note - Rapid Response   Raman Terrell 80 y o  female MRN: [id number]    Time Called ( Time): 5:58  Date Called: 12/8/19  Level of Care: MS  Room#: 921  FFFEBSO Time ( Time): 6:00  Event End Time ( Time): 6:34  Primary reason for call: Acute change in SPO2  Interventions:  Airway/Breathing:  Intubated  Circulation: IV Fluid Bolus  Other Treatments: Levophed/CXR       Assessment:   1  Acute hypoxic respiratory failure  2  Toxic metabolic encephalopathy  3  Lactic acid     Plan:   · Continue mechanical ventilation at lung protective volumes  · Stat labs CBC  CMP, Mg, Phos, I lavell  · ABG  · CXR/CTA  · Respiratory panel        HPI/Chief Complaint (Background/Situation):   Christine Conde is a 80y o  year old female who [facility] for evaluation of acute onset shortness of breath  Patient has a significant past medical history of hypertension, hyperlipidemia, and DM type 2  Patient lives at home independently and is a still actively practicing beautician  Patient reports worsening shortness of breath x3 days  She denies fever, chills, or sputum production  Per family she has been more lethargic and easily fatigued with minimal exertion  Patient was subsequently admitted to hospitalist service treated for CAP  Urine antigens/procalcitonin negative  Today after ambulating to bathroom patient became acutely hypoxic, and bradycardic  Rapid response was called  Upon arrival patient minimally responsive with agonal breathing  She was noted to be bradycardic and hypotensive  Patient was intubated upon arrival of in setting of hypoxemia and hemodynamic instability  She required brief period of vasopressor support following intubation  She was subsequently transferred to ICU for further medical management workup      Historical Information   Past Medical History:   Diagnosis Date    Diabetes mellitus ([facility] [address] )     Hematuria     3OCT2017 RESOLVED    Hypertension Past Surgical History:   Procedure Laterality Date    HYSTERECTOMY      REPLACEMENT TOTAL KNEE      TONSILLECTOMY       Social History   Social History     Substance and Sexual Activity   Alcohol Use Never    Frequency: Never    Binge frequency: Never     Social History     Substance and Sexual Activity   Drug Use No     Social History     Tobacco Use   Smoking Status Never Smoker   Smokeless Tobacco Never Used     Family History:    Meds/Allergies     Current Facility-Administered Medications:  acetaminophen 650 mg Oral Q6H PRN Elyse Sher, PA-C    albuterol 2 5 mg Nebulization Q4H PRN Elyse Sher, PA-C    calcium carbonate 1,000 mg Oral Daily PRN Elyse Sher, PA-C    cefTRIAXone 1,000 mg Intravenous Q24H Elyse Sher, PA-C Last Rate: 1,000 mg (12/07/19 1643)   chlorhexidine 15 mL Swish & Spit Q12H [facility] SHAWN Kelsey-SERGEI    dexmedetomidine 0 1-0 7 mcg/kg/hr Intravenous Titrated Elyse Sher, PA-C Last Rate: 0 7 mcg/kg/hr (12/08/19 0801)   docusate sodium 100 mg Oral BID SHAWN Kelsey-C    fentanyl citrate (PF) 50 mcg Intravenous Q2H PRN SHAWN Kelsey-C    heparin (porcine) 3-30 Units/kg/hr (Order-Specific) Intravenous Titrated Elyse Sher, PA-C Last Rate: 18 Units/kg/hr (12/08/19 1136)   heparin (porcine) 2,600 Units Intravenous PRN Elyse Sher, PA-C    heparin (porcine) 5,200 Units Intravenous PRN SHAWN Kelsey-C    insulin lispro 1-6 Units Subcutaneous Q6H [facility] SHAWN Kelsey-SERGEI    ipratropium 0 5 mg Nebulization Q6H Mark Lischner, DO    levalbuterol 1 25 mg Nebulization Q6H SHAWN Kelsey-SERGEI    multi-electrolyte 75 mL/hr Intravenous Continuous Elyse Sher, PA-C Last Rate: 75 mL/hr (12/08/19 0754)   ondansetron 4 mg Intravenous Q6H PRN Elyse Sher PA-C    pravastatin 40 mg Oral Daily With Sho Goins PA-C    sodium chloride (PF) 3 mL Intravenous PRN SHAWN Kelsey-SERGEI    spironolactone 12 5 mg Oral Daily Elyse Sher PA-C          dexmedetomidine 0 1-0 7 mcg/kg/hr Last Rate: 0 7 mcg/kg/hr (12/08/19 0801)   heparin (porcine) 3-30 Units/kg/hr (Order-Specific) Last Rate: 18 Units/kg/hr (12/08/19 1136)   multi-electrolyte 75 mL/hr Last Rate: 75 mL/hr (12/08/19 0754)       Allergies   Allergen Reactions    Benazepril        Intake/Output Summary (Last 24 hours) at 12/8/2019 1301  Last data filed at 12/8/2019 1201  Gross per 24 hour   Intake 1328 9 ml   Output 70 ml   Net 1258 9 ml       Respiratory    Lab Data (Last 4 hours)    None         O2/Vent Data (Last 4 hours)      12/08 1115          Drager Vent Mode AC/VC+       Resp Rate (BPM) (BPM) 16       VT (mL) (mL) 360       Insp Time (S) (S) 0 8       FIO2 (%) (%) 65       PEEP (cmH2O) (cmH2O) 8       Rise Time (%) (%) 0 2       MV (Obs) 8 18                 Invasive Devices     Peripheral Intravenous Line            Peripheral IV 12/04/19 Right Antecubital 3 days    Peripheral IV 12/08/19 Right Wrist less than 1 day    Peripheral IV 12/08/19 Right;Ventral (anterior) Forearm less than 1 day          Drain            NG/OG/Enteral Tube Orogastric 14 Fr Right mouth less than 1 day    Urethral Catheter less than 1 day          Airway            ETT  Cuffed 7 mm less than 1 day                DIAGNOSTIC DATA:    Lab: I have personally reviewed pertinent lab results  CBC:   Results from last 7 days   Lab Units 12/08/19  1152   WBC Thousand/uL 14 48*   HEMOGLOBIN g/dL 14 3   HEMATOCRIT % 43 9   PLATELETS Thousands/uL 234     CMP:   Results from last 7 days   Lab Units 12/08/19  0735 12/08/19  0548 12/06/19  0500  12/04/19  1313   POTASSIUM mmol/L 4 5 4 1 4 1   < > 5 1   CHLORIDE mmol/L 104 103 106   < > 101   CO2 mmol/L 19* 21 23   < > 28   BUN mg/dL 40* 38* 33*   < > 35*   CREATININE mg/dL 1 19 1 02 0 98   < > 1 23   CALCIUM mg/dL 8 7 9 5 9 4   < > 10 7*   ALK PHOS U/L 159*  --   --   --  148*   ALT U/L 74  --   --   --  25   AST U/L 89*  --   --   --  15    < > = values in this interval not displayed       PT/INR:   Lab Results   Component Value Date    INR 1 22 (H) 12/08/2019   ,   Magnesium: No components found for: MAG,   Phosphorous:   Lab Results   Component Value Date    PHOS 4 1 12/08/2019       Microbiology:  No results found for: Elias Pacheco, SPUTUMCULTCARLO      OUTCOME:   Transferred to Critical Care Unit  Family notified of transfer: yes  Family member contacted: Daughter [facility]  Code Status: Level 1 - Full Code  Critical Care Time: Total Critical Care time spent  minutes excluding procedures, teaching and family updates

## 2019-12-08 NOTE — PROCEDURES
Central Line Insertion  Date/Time: 12/8/2019 6:56 PM  Performed by: Pawel Barksdale MD  Authorized by: Pawel Barksdale MD     Patient location:  IR  Consent:     Consent obtained:  Written    Consent given by: daughter  Risks discussed:  Bleeding and infection    Alternatives discussed:  No treatment, delayed treatment, alternative treatment and observation  Universal protocol:     Procedure explained and questions answered to patient or proxy's satisfaction: yes      Relevant documents present and verified: yes      Test results available and properly labeled: yes      Radiology Images displayed and confirmed  If images not available, report reviewed: yes      Required blood products, implants, devices, and special equipment available: yes      Site/side marked: yes      Immediately prior to procedure, a time out was called: yes      Patient identity confirmed:  Arm band, hospital-assigned identification number and anonymous protocol, patient vented/unresponsive  Pre-procedure details:     Hand hygiene: Hand hygiene performed prior to insertion      Sterile barrier technique: All elements of maximal sterile technique followed      Skin preparation:  ChloraPrep    Skin preparation agent: Skin preparation agent completely dried prior to procedure    Indications:     Central line indications: medications requiring central line    Anesthesia (see MAR for exact dosages):      Anesthesia method:  Local infiltration    Local anesthetic:  Lidocaine 1% w/o epi  Procedure details:     Location:  Left internal jugular    Vessel type: vein      Approach: percutaneous technique used      Patient position:  Flat    Catheter type:  Triple lumen 20cm    Catheter size:  7 Fr    Ultrasound guidance: yes      Sterile ultrasound techniques: Sterile gel and sterile probe covers were used      Number of attempts:  1    Successful placement: yes      Vessel of catheter tip end:  SVC  Post-procedure details:     Post-procedure: Dressing applied    Assessment:  Blood return through all ports, free fluid flow, no pneumothorax on x-ray and placement verified by x-ray    Patient tolerance of procedure:   Tolerated well, no immediate complications

## 2019-12-08 NOTE — BRIEF OP NOTE (RAD/CATH)
IR PULMONARY LYSIS Procedure Note    PATIENT NAME: Alva Terrell  : 1928  MRN: [id number]    Pre-op Diagnosis:   1  SOB (shortness of breath)      Post-op Diagnosis:   1  SOB (shortness of breath)        Surgeon:   Dominic Mann MD  Assistants:     No qualified resident was available, Resident is only observing    Estimated Blood Loss: minimal  Findings: Right pulmonary arteriogram shows segmental emboli and elevated pulmonary pressures (55/23 32 mmHg)  During placement of the left catheter, the patient became progressively hypotensive and unresponsive to pressors  Eventually, the patient's heart stopped and a code was initiated  After discussion with the family, no further intervention was performed on compressions were stopped  Specimens: none    Complications:  Cardiac arrest  Death      Anesthesia: General    Dominic Mann MD     Date: 2019  Time: 6:58 PM

## 2019-12-08 NOTE — PLAN OF CARE
Problem: Potential for Falls  Goal: Patient will remain free of falls  Description  INTERVENTIONS:  - Assess patient frequently for physical needs  -  Identify cognitive and physical deficits and behaviors that affect risk of falls    -  [place] fall precautions as indicated by assessment   - Educate patient/family on patient safety including physical limitations  - Instruct patient to call for assistance with activity based on assessment  - Modify environment to reduce risk of injury  - Consider OT/PT consult to assist with strengthening/mobility  Outcome: Progressing     Problem: PAIN - ADULT  Goal: Verbalizes/displays adequate comfort level or baseline comfort level  Description  Interventions:  - Encourage patient to monitor pain and request assistance  - Assess pain using appropriate pain scale  - Administer analgesics based on type and severity of pain and evaluate response  - Implement non-pharmacological measures as appropriate and evaluate response  - Consider cultural and social influences on pain and pain management  - Notify physician/advanced practitioner if interventions unsuccessful or patient reports new pain  Outcome: Progressing     Problem: INFECTION - ADULT  Goal: Absence or prevention of progression during hospitalization  Description  INTERVENTIONS:  - Assess and monitor for signs and symptoms of infection  - Monitor lab/diagnostic results  - Monitor all insertion sites, i e  indwelling lines, tubes, and drains  - Monitor endotracheal if appropriate and nasal secretions for changes in amount and color  - [place] appropriate cooling/warming therapies per order  - Administer medications as ordered  - Instruct and encourage patient and family to use good hand hygiene technique  - Identify and instruct in appropriate isolation precautions for identified infection/condition  Outcome: Progressing     Problem: SAFETY ADULT  Goal: Maintain or return to baseline ADL function  Description  INTERVENTIONS:  -  Assess patient's ability to carry out ADLs; assess patient's baseline for ADL function and identify physical deficits which impact ability to perform ADLs (bathing, care of mouth/teeth, toileting, grooming, dressing, etc )  - Assess/evaluate cause of self-care deficits   - Assess range of motion  - Assess patient's mobility; develop plan if impaired  - Assess patient's need for assistive devices and provide as appropriate  - Encourage maximum independence but intervene and supervise when necessary  - Involve family in performance of ADLs  - Assess for home care needs following discharge   - Consider OT consult to assist with ADL evaluation and planning for discharge  - Provide patient education as appropriate  Outcome: Progressing  Goal: Maintain or return mobility status to optimal level  Description  INTERVENTIONS:  - Assess patient's baseline mobility status (ambulation, transfers, stairs, etc )    - Identify cognitive and physical deficits and behaviors that affect mobility  - Identify mobility aids required to assist with transfers and/or ambulation (gait belt, sit-to-stand, lift, walker, cane, etc )  - [place] fall precautions as indicated by assessment  - Record patient progress and toleration of activity level on Mobility SBAR; progress patient to next Phase/Stage  - Instruct patient to call for assistance with activity based on assessment  - Consider rehabilitation consult to assist with strengthening/weightbearing, etc   Outcome: Progressing     Problem: DISCHARGE PLANNING  Goal: Discharge to home or other facility with appropriate resources  Description  INTERVENTIONS:  - Identify barriers to discharge w/patient and caregiver  - Arrange for needed discharge resources and transportation as appropriate  - Identify discharge learning needs (meds, wound care, etc )  - Arrange for interpretive services to assist at discharge as needed  - Refer to Case Management Department for coordinating discharge planning if the patient needs post-hospital services based on physician/advanced practitioner order or complex needs related to functional status, cognitive ability, or social support system  Outcome: Progressing     Problem: Knowledge Deficit  Goal: Patient/family/caregiver demonstrates understanding of disease process, treatment plan, medications, and discharge instructions  Description  Complete learning assessment and assess knowledge base    Interventions:  - Provide teaching at level of understanding  - Provide teaching via preferred learning methods  Outcome: Progressing     Problem: CARDIOVASCULAR - ADULT  Goal: Maintains optimal cardiac output and hemodynamic stability  Description  INTERVENTIONS:  - Monitor I/O, vital signs and rhythm  - Monitor for S/S and trends of decreased cardiac output  - Administer and titrate ordered vasoactive medications to optimize hemodynamic stability  - Assess quality of pulses, skin color and temperature  - Assess for signs of decreased coronary artery perfusion  - Instruct patient to report change in severity of symptoms  Outcome: Progressing     Problem: RESPIRATORY - ADULT  Goal: Achieves optimal ventilation and oxygenation  Description  INTERVENTIONS:  - Assess for changes in respiratory status  - Assess for changes in mentation and behavior  - Position to facilitate oxygenation and minimize respiratory effort  - Oxygen administered by appropriate delivery if ordered  - Initiate smoking cessation education as indicated  - Encourage broncho-pulmonary hygiene including cough, deep breathe, Incentive Spirometry  - Assess the need for suctioning and aspirate as needed  - Assess and instruct to report SOB or any respiratory difficulty  - Respiratory Therapy support as indicated  Outcome: Progressing     Problem: Prexisting or High Potential for Compromised Skin Integrity  Goal: Skin integrity is maintained or improved  Description  INTERVENTIONS:  - Identify patients at risk for skin breakdown  - Assess and monitor skin integrity  - Assess and monitor nutrition and hydration status  - Monitor labs   - Assess for incontinence   - Turn and reposition patient  - Assist with mobility/ambulation  - Relieve pressure over bony prominences  - Avoid friction and shearing  - Provide appropriate hygiene as needed including keeping skin clean and dry  - Evaluate need for skin moisturizer/barrier cream  - Collaborate with interdisciplinary team   - Patient/family teaching  - Consider wound care consult   Outcome: Progressing

## 2019-12-08 NOTE — RESPIRATORY THERAPY NOTE
RT Ventilator Management Note  Renny Boxer Pipher 80 y o  female MRN: [id number]  Unit/Bed#:  Encounter: [id number]      Daily Screen       12/8/2019 0628 12/8/2019  0739          Patient safety screen outcome[de-identified]  Failed  Failed      Not Ready for Weaning due to[de-identified]  Underline problem not resolved  Underline problem not resolved              Physical Exam:   Assessment Type: Assess only  General Appearance: Awake  Respiratory Pattern: Assisted  Chest Assessment: Chest expansion symmetrical  Bilateral Breath Sounds: Clear, Diminished  Suction: ET Tube  O2 Device: vent       Resp Comments: pt intubated this am   Pt is awake and remains on full mechanical support  No changes made to vent settings  Skin integrity remains intact with 2 finger fit between tube varma and pt    Will continue to monitor

## 2019-12-08 NOTE — ASSESSMENT & PLAN NOTE
Lab Results   Component Value Date    HGBA1C 7 0 (H) 12/04/2019       Recent Labs     12/06/19 2054 12/07/19  0637 12/07/19  1121 12/07/19  1533   POCGLU 200* 174* 194* 220*       Blood Sugar Average: Last 72 hrs:  (P) 973 [id number]   · Appears fairly well controlled as an outpatient as noted by hgbA1c  · Hold glimepiride while inpatient   · Continue SSI coverage, will increase algorithm and add bedtime coverage   Also add long acting Lantus 5 units QHS   · QID glucose checks  · Monitor

## 2019-12-08 NOTE — PLAN OF CARE
Problem: Prexisting or High Potential for Compromised Skin Integrity  Goal: Skin integrity is maintained or improved  Description  INTERVENTIONS:  - Identify patients at risk for skin breakdown  - Assess and monitor skin integrity  - Assess and monitor nutrition and hydration status  - Monitor labs   - Assess for incontinence   - Turn and reposition patient  - Assist with mobility/ambulation  - Relieve pressure over bony prominences  - Avoid friction and shearing  - Provide appropriate hygiene as needed including keeping skin clean and dry  - Evaluate need for skin moisturizer/barrier cream  - Collaborate with interdisciplinary team   - Patient/family teaching  - Consider wound care consult   Outcome: Progressing     Problem: Potential for Falls  Goal: Patient will remain free of falls  Description  INTERVENTIONS:  - Assess patient frequently for physical needs  -  Identify cognitive and physical deficits and behaviors that affect risk of falls    -  [place] fall precautions as indicated by assessment   - Educate patient/family on patient safety including physical limitations  - Instruct patient to call for assistance with activity based on assessment  - Modify environment to reduce risk of injury  - Consider OT/PT consult to assist with strengthening/mobility  Outcome: Progressing     Problem: NEUROSENSORY - ADULT  Goal: Achieves stable or improved neurological status  Description  INTERVENTIONS  - Monitor and report changes in neurological status  - Monitor vital signs such as temperature, blood pressure, glucose, and any other labs ordered   - Initiate measures to prevent increased intracranial pressure  - Monitor for seizure activity and implement precautions if appropriate      Outcome: Progressing  Goal: Remains free of injury related to seizures activity  Description  INTERVENTIONS  - Maintain airway, patient safety  and administer oxygen as ordered  - Monitor patient for seizure activity, document and report duration and description of seizure to physician/advanced practitioner  - If seizure occurs,  ensure patient safety during seizure  - Reorient patient post seizure  - Seizure pads on all 4 side rails  - Instruct patient/family to notify RN of any seizure activity including if an aura is experienced  - Instruct patient/family to call for assistance with activity based on nursing assessment  - Administer anti-seizure medications if ordered    Outcome: Progressing  Goal: Achieves maximal functionality and self care  Description  INTERVENTIONS  - Monitor swallowing and airway patency with patient fatigue and changes in neurological status  - Encourage and assist patient to increase activity and self care     - Encourage visually impaired, hearing impaired and aphasic patients to use assistive/communication devices  Outcome: Progressing     Problem: CARDIOVASCULAR - ADULT  Goal: Maintains optimal cardiac output and hemodynamic stability  Description  INTERVENTIONS:  - Monitor I/O, vital signs and rhythm  - Monitor for S/S and trends of decreased cardiac output  - Administer and titrate ordered vasoactive medications to optimize hemodynamic stability  - Assess quality of pulses, skin color and temperature  - Assess for signs of decreased coronary artery perfusion  - Instruct patient to report change in severity of symptoms  Outcome: Progressing  Goal: Absence of cardiac dysrhythmias or at baseline rhythm  Description  INTERVENTIONS:  - Continuous cardiac monitoring, vital signs, obtain 12 lead EKG if ordered  - Administer antiarrhythmic and heart rate control medications as ordered  - Monitor electrolytes and administer replacement therapy as ordered  Outcome: Progressing     Problem: RESPIRATORY - ADULT  Goal: Achieves optimal ventilation and oxygenation  Description  INTERVENTIONS:  - Assess for changes in respiratory status  - Assess for changes in mentation and behavior  - Position to facilitate oxygenation and minimize respiratory effort  - Oxygen administered by appropriate delivery if ordered  - Initiate smoking cessation education as indicated  - Encourage broncho-pulmonary hygiene including cough, deep breathe, Incentive Spirometry  - Assess the need for suctioning and aspirate as needed  - Assess and instruct to report SOB or any respiratory difficulty  - Respiratory Therapy support as indicated  Outcome: Progressing     Problem: GASTROINTESTINAL - ADULT  Goal: Minimal or absence of nausea and/or vomiting  Description  INTERVENTIONS:  - Administer IV fluids if ordered to ensure adequate hydration  - Maintain NPO status until nausea and vomiting are resolved  - Nasogastric tube if ordered  - Administer ordered antiemetic medications as needed  - Provide nonpharmacologic comfort measures as appropriate  - Advance diet as tolerated, if ordered  - Consider nutrition services referral to assist patient with adequate nutrition and appropriate food choices  Outcome: Progressing  Goal: Maintains or returns to baseline bowel function  Description  INTERVENTIONS:  - Assess bowel function  - Encourage oral fluids to ensure adequate hydration  - Administer IV fluids if ordered to ensure adequate hydration  - Administer ordered medications as needed  - Encourage mobilization and activity  - Consider nutritional services referral to assist patient with adequate nutrition and appropriate food choices  Outcome: Progressing  Goal: Maintains adequate nutritional intake  Description  INTERVENTIONS:  - Monitor percentage of each meal consumed  - Identify factors contributing to decreased intake, treat as appropriate  - Assist with meals as needed  - Monitor I&O, weight, and lab values if indicated  - Obtain nutrition services referral as needed  Outcome: Progressing  Goal: Establish and maintain optimal ostomy function  Description  INTERVENTIONS:  - Assess bowel function  - Encourage oral fluids to ensure adequate hydration  - Administer IV fluids if ordered to ensure adequate hydration   - Administer ordered medications as needed  - Encourage mobilization and activity  - Nutrition services referral to assist patient with appropriate food choices  - Assess stoma site  - Consider wound care consult   Outcome: Progressing     Problem: GENITOURINARY - ADULT  Goal: Maintains or returns to baseline urinary function  Description  INTERVENTIONS:  - Assess urinary function  - Encourage oral fluids to ensure adequate hydration if ordered  - Administer IV fluids as ordered to ensure adequate hydration  - Administer ordered medications as needed  - Offer frequent toileting  - Follow urinary retention protocol if ordered  Outcome: Progressing  Goal: Absence of urinary retention  Description  INTERVENTIONS:  - Assess patients ability to void and empty bladder  - Monitor I/O  - Bladder scan as needed  - Discuss with physician/AP medications to alleviate retention as needed  - Discuss catheterization for long term situations as appropriate  Outcome: Progressing  Goal: Urinary catheter remains patent  Description  INTERVENTIONS:  - Assess patency of urinary catheter  - If patient has a chronic isaacs, consider changing catheter if non-functioning  - Follow guidelines for intermittent irrigation of non-functioning urinary catheter  Outcome: Progressing     Problem: METABOLIC, FLUID AND ELECTROLYTES - ADULT  Goal: Electrolytes maintained within normal limits  Description  INTERVENTIONS:  - Monitor labs and assess patient for signs and symptoms of electrolyte imbalances  - Administer electrolyte replacement as ordered  - Monitor response to electrolyte replacements, including repeat lab results as appropriate  - Instruct patient on fluid and nutrition as appropriate  Outcome: Progressing  Goal: Fluid balance maintained  Description  INTERVENTIONS:  - Monitor labs   - Monitor I/O and WT  - Instruct patient on fluid and nutrition as appropriate  - Assess for signs & symptoms of volume excess or deficit  Outcome: Progressing  Goal: Glucose maintained within target range  Description  INTERVENTIONS:  - Monitor Blood Glucose as ordered  - Assess for signs and symptoms of hyperglycemia and hypoglycemia  - Administer ordered medications to maintain glucose within target range  - Assess nutritional intake and initiate nutrition service referral as needed  Outcome: Progressing     Problem: SKIN/TISSUE INTEGRITY - ADULT  Goal: Skin integrity remains intact  Description  INTERVENTIONS  - Identify patients at risk for skin breakdown  - Assess and monitor skin integrity  - Assess and monitor nutrition and hydration status  - Monitor labs (i e  albumin)  - Assess for incontinence   - Turn and reposition patient  - Assist with mobility/ambulation  - Relieve pressure over bony prominences  - Avoid friction and shearing  - Provide appropriate hygiene as needed including keeping skin clean and dry  - Evaluate need for skin moisturizer/barrier cream  - Collaborate with interdisciplinary team (i e  Nutrition, Rehabilitation, etc )   - Patient/family teaching  Outcome: Progressing  Goal: Incision(s), wounds(s) or drain site(s) healing without S/S of infection  Description  INTERVENTIONS  - Assess and document risk factors for skin impairment   - Assess and document dressing, incision, wound bed, drain sites and surrounding tissue  - Consider nutrition services referral as needed  - Oral mucous membranes remain intact  - Provide patient/ family education  Outcome: Progressing  Goal: Oral mucous membranes remain intact  Description  INTERVENTIONS  - Assess oral mucosa and hygiene practices  - Implement preventative oral hygiene regimen  - Implement oral medicated treatments as ordered  - Initiate Nutrition services referral as needed  Outcome: Progressing     Problem: HEMATOLOGIC - ADULT  Goal: Maintains hematologic stability  Description  INTERVENTIONS  - Assess for signs and symptoms of bleeding or hemorrhage  - Monitor labs  - Administer supportive blood products/factors as ordered and appropriate  Outcome: Progressing     Problem: MUSCULOSKELETAL - ADULT  Goal: Maintain or return mobility to safest level of function  Description  INTERVENTIONS:  - Assess patient's ability to carry out ADLs; assess patient's baseline for ADL function and identify physical deficits which impact ability to perform ADLs (bathing, care of mouth/teeth, toileting, grooming, dressing, etc )  - Assess/evaluate cause of self-care deficits   - Assess range of motion  - Assess patient's mobility  - Assess patient's need for assistive devices and provide as appropriate  - Encourage maximum independence but intervene and supervise when necessary  - Involve family in performance of ADLs  - Assess for home care needs following discharge   - Consider OT consult to assist with ADL evaluation and planning for discharge  - Provide patient education as appropriate  Outcome: Progressing  Goal: Maintain proper alignment of affected body part  Description  INTERVENTIONS:  - Support, maintain and protect limb and body alignment  - Provide patient/ family with appropriate education  Outcome: Progressing     Problem: Nutrition/Hydration-ADULT  Goal: Nutrient/Hydration intake appropriate for improving, restoring or maintaining nutritional needs  Description  Monitor and assess patient's nutrition/hydration status for malnutrition  Collaborate with interdisciplinary team and initiate plan and interventions as ordered  Monitor patient's weight and dietary intake as ordered or per policy  Utilize nutrition screening tool and intervene as necessary  Determine patient's food preferences and provide high-protein, high-caloric foods as appropriate       INTERVENTIONS:  - Monitor oral intake, urinary output, labs, and treatment plans  - Assess nutrition and hydration status and recommend course of action  - Evaluate amount of meals eaten  - Assist patient with eating if necessary   - Allow adequate time for meals  - Recommend/ encourage appropriate diets, oral nutritional supplements, and vitamin/mineral supplements  - Order, calculate, and assess calorie counts as needed  - Recommend, monitor, and adjust tube feedings and TPN/PPN based on assessed needs  - Assess need for intravenous fluids  - Provide specific nutrition/hydration education as appropriate  - Include patient/family/caregiver in decisions related to nutrition  Outcome: Progressing     Problem: PAIN - ADULT  Goal: Verbalizes/displays adequate comfort level or baseline comfort level  Description  Interventions:  - Encourage patient to monitor pain and request assistance  - Assess pain using appropriate pain scale  - Administer analgesics based on type and severity of pain and evaluate response  - Implement non-pharmacological measures as appropriate and evaluate response  - Consider cultural and social influences on pain and pain management  - Notify physician/advanced practitioner if interventions unsuccessful or patient reports new pain  Outcome: Progressing     Problem: INFECTION - ADULT  Goal: Absence or prevention of progression during hospitalization  Description  INTERVENTIONS:  - Assess and monitor for signs and symptoms of infection  - Monitor lab/diagnostic results  - Monitor all insertion sites, i e  indwelling lines, tubes, and drains  - Monitor endotracheal if appropriate and nasal secretions for changes in amount and color  - [place] appropriate cooling/warming therapies per order  - Administer medications as ordered  - Instruct and encourage patient and family to use good hand hygiene technique  - Identify and instruct in appropriate isolation precautions for identified infection/condition  Outcome: Progressing  Goal: Absence of fever/infection during neutropenic period  Description  INTERVENTIONS:  - Monitor WBC    Outcome: Progressing     Problem: SAFETY ADULT  Goal: Maintain or return to baseline ADL function  Description  INTERVENTIONS:  -  Assess patient's ability to carry out ADLs; assess patient's baseline for ADL function and identify physical deficits which impact ability to perform ADLs (bathing, care of mouth/teeth, toileting, grooming, dressing, etc )  - Assess/evaluate cause of self-care deficits   - Assess range of motion  - Assess patient's mobility; develop plan if impaired  - Assess patient's need for assistive devices and provide as appropriate  - Encourage maximum independence but intervene and supervise when necessary  - Involve family in performance of ADLs  - Assess for home care needs following discharge   - Consider OT consult to assist with ADL evaluation and planning for discharge  - Provide patient education as appropriate  Outcome: Progressing  Goal: Maintain or return mobility status to optimal level  Description  INTERVENTIONS:  - Assess patient's baseline mobility status (ambulation, transfers, stairs, etc )    - Identify cognitive and physical deficits and behaviors that affect mobility  - Identify mobility aids required to assist with transfers and/or ambulation (gait belt, sit-to-stand, lift, walker, cane, etc )  - [place] fall precautions as indicated by assessment  - Record patient progress and toleration of activity level on Mobility SBAR; progress patient to next Phase/Stage  - Instruct patient to call for assistance with activity based on assessment  - Consider rehabilitation consult to assist with strengthening/weightbearing, etc   Outcome: Progressing

## 2019-12-08 NOTE — RESPIRATORY THERAPY NOTE
Responded to overhead page for rapid response  Patient was unresponsive and rescue breathing was underway  Therapist  Assumed ambu bag  Patient was a wide awake intubation    Once IV access was established patient was bagged through transport to ICU where ICU therapist accepted patient and promptly placed her on a vent

## 2019-12-08 NOTE — ASSESSMENT & PLAN NOTE
· Has been experiencing shortness of breath over the past few weeks  · CT scan likely (+) for pneumonia, continue IV abx treatment   · Resume home dose of spirinolactone, pt does not appear to be overtly overloaded   · If no improvement, consider ECHO/repeat CXR  · Continue supplemental O2, pt currently requiring 3 5-4 L with saturations in the 90s, improvement noted   monitor closely   · Will need home O2 eval on discharge

## 2019-12-08 NOTE — PLAN OF CARE
Problem: Potential for Falls  Goal: Patient will remain free of falls  Description  INTERVENTIONS:  - Assess patient frequently for physical needs  -  Identify cognitive and physical deficits and behaviors that affect risk of falls    -  [place] fall precautions as indicated by assessment   - Educate patient/family on patient safety including physical limitations  - Instruct patient to call for assistance with activity based on assessment  - Modify environment to reduce risk of injury  - Consider OT/PT consult to assist with strengthening/mobility  12/8/2019 1336 by Kath Rajan RN  Outcome: Progressing  12/8/2019 1205 by Kath Rajan RN  Outcome: Progressing     Problem: PAIN - ADULT  Goal: Verbalizes/displays adequate comfort level or baseline comfort level  Description  Interventions:  - Encourage patient to monitor pain and request assistance  - Assess pain using appropriate pain scale  - Administer analgesics based on type and severity of pain and evaluate response  - Implement non-pharmacological measures as appropriate and evaluate response  - Consider cultural and social influences on pain and pain management  - Notify physician/advanced practitioner if interventions unsuccessful or patient reports new pain  12/8/2019 1336 by Kath Rajan RN  Outcome: Progressing  12/8/2019 1205 by Kath Rajan RN  Outcome: Progressing     Problem: INFECTION - ADULT  Goal: Absence or prevention of progression during hospitalization  Description  INTERVENTIONS:  - Assess and monitor for signs and symptoms of infection  - Monitor lab/diagnostic results  - Monitor all insertion sites, i e  indwelling lines, tubes, and drains  - Monitor endotracheal if appropriate and nasal secretions for changes in amount and color  - [place] appropriate cooling/warming therapies per order  - Administer medications as ordered  - Instruct and encourage patient and family to use good hand hygiene technique  - Identify and instruct in appropriate isolation precautions for identified infection/condition  12/8/2019 1336 by Claude Marquez RN  Outcome: Progressing  12/8/2019 1205 by Claude Marquez RN  Outcome: Progressing     Problem: SAFETY ADULT  Goal: Maintain or return to baseline ADL function  Description  INTERVENTIONS:  -  Assess patient's ability to carry out ADLs; assess patient's baseline for ADL function and identify physical deficits which impact ability to perform ADLs (bathing, care of mouth/teeth, toileting, grooming, dressing, etc )  - Assess/evaluate cause of self-care deficits   - Assess range of motion  - Assess patient's mobility; develop plan if impaired  - Assess patient's need for assistive devices and provide as appropriate  - Encourage maximum independence but intervene and supervise when necessary  - Involve family in performance of ADLs  - Assess for home care needs following discharge   - Consider OT consult to assist with ADL evaluation and planning for discharge  - Provide patient education as appropriate  12/8/2019 1336 by Claude Marquez RN  Outcome: Progressing  12/8/2019 1205 by Claude Marquez RN  Outcome: Progressing  Goal: Maintain or return mobility status to optimal level  Description  INTERVENTIONS:  - Assess patient's baseline mobility status (ambulation, transfers, stairs, etc )    - Identify cognitive and physical deficits and behaviors that affect mobility  - Identify mobility aids required to assist with transfers and/or ambulation (gait belt, sit-to-stand, lift, walker, cane, etc )  - [place] fall precautions as indicated by assessment  - Record patient progress and toleration of activity level on Mobility SBAR; progress patient to next Phase/Stage  - Instruct patient to call for assistance with activity based on assessment  - Consider rehabilitation consult to assist with strengthening/weightbearing, etc   12/8/2019 1336 by Claude Marquez RN  Outcome: Progressing  12/8/2019 1205 by Jake Sims RN  Outcome: Progressing     Problem: DISCHARGE PLANNING  Goal: Discharge to home or other facility with appropriate resources  Description  INTERVENTIONS:  - Identify barriers to discharge w/patient and caregiver  - Arrange for needed discharge resources and transportation as appropriate  - Identify discharge learning needs (meds, wound care, etc )  - Arrange for interpretive services to assist at discharge as needed  - Refer to Case Management Department for coordinating discharge planning if the patient needs post-hospital services based on physician/advanced practitioner order or complex needs related to functional status, cognitive ability, or social support system  12/8/2019 1336 by Jake Sims RN  Outcome: Progressing  12/8/2019 1205 by Jake Sims RN  Outcome: Progressing     Problem: Knowledge Deficit  Goal: Patient/family/caregiver demonstrates understanding of disease process, treatment plan, medications, and discharge instructions  Description  Complete learning assessment and assess knowledge base    Interventions:  - Provide teaching at level of understanding  - Provide teaching via preferred learning methods  12/8/2019 1336 by Jake Sims RN  Outcome: Progressing  12/8/2019 1205 by Jake Sims RN  Outcome: Progressing     Problem: CARDIOVASCULAR - ADULT  Goal: Maintains optimal cardiac output and hemodynamic stability  Description  INTERVENTIONS:  - Monitor I/O, vital signs and rhythm  - Monitor for S/S and trends of decreased cardiac output  - Administer and titrate ordered vasoactive medications to optimize hemodynamic stability  - Assess quality of pulses, skin color and temperature  - Assess for signs of decreased coronary artery perfusion  - Instruct patient to report change in severity of symptoms  12/8/2019 1336 by Jake Sism RN  Outcome: Progressing  12/8/2019 1205 by Jake Sims RN  Outcome: Progressing     Problem: RESPIRATORY - ADULT  Goal: Achieves optimal ventilation and oxygenation  Description  INTERVENTIONS:  - Assess for changes in respiratory status  - Assess for changes in mentation and behavior  - Position to facilitate oxygenation and minimize respiratory effort  - Oxygen administered by appropriate delivery if ordered  - Initiate smoking cessation education as indicated  - Encourage broncho-pulmonary hygiene including cough, deep breathe, Incentive Spirometry  - Assess the need for suctioning and aspirate as needed  - Assess and instruct to report SOB or any respiratory difficulty  - Respiratory Therapy support as indicated  12/8/2019 1336 by Amy Be RN  Outcome: Progressing  12/8/2019 1205 by Amy Be RN  Outcome: Progressing     Problem: Prexisting or High Potential for Compromised Skin Integrity  Goal: Skin integrity is maintained or improved  Description  INTERVENTIONS:  - Identify patients at risk for skin breakdown  - Assess and monitor skin integrity  - Assess and monitor nutrition and hydration status  - Monitor labs   - Assess for incontinence   - Turn and reposition patient  - Assist with mobility/ambulation  - Relieve pressure over bony prominences  - Avoid friction and shearing  - Provide appropriate hygiene as needed including keeping skin clean and dry  - Evaluate need for skin moisturizer/barrier cream  - Collaborate with interdisciplinary team   - Patient/family teaching  - Consider wound care consult   12/8/2019 1336 by Amy Be RN  Outcome: Progressing  12/8/2019 1205 by Amy Be RN  Outcome: Progressing     Problem: SAFETY,RESTRAINT: NV/NON-SELF DESTRUCTIVE BEHAVIOR  Goal: Remains free of harm/injury (restraint for non violent/non self-detsructive behavior)  Description  INTERVENTIONS:  - Instruct patient/family regarding restraint use   - Assess and monitor physiologic and psychological status   - Provide interventions and comfort measures to meet assessed patient needs   - Identify and implement measures to help patient regain control  - Assess readiness for release of restraint   Outcome: Progressing  Goal: Returns to optimal restraint-free functioning  Description  INTERVENTIONS:  - Assess the patient's behavior and symptoms that indicate continued need for restraint  - Identify and implement measures to help patient regain control  - Assess readiness for release of restraint   Outcome: Progressing     Problem: Nutrition/Hydration-ADULT  Goal: Nutrient/Hydration intake appropriate for improving, restoring or maintaining nutritional needs  Description  Monitor and assess patient's nutrition/hydration status for malnutrition  Collaborate with interdisciplinary team and initiate plan and interventions as ordered  Monitor patient's weight and dietary intake as ordered or per policy  Utilize nutrition screening tool and intervene as necessary  Determine patient's food preferences and provide high-protein, high-caloric foods as appropriate       INTERVENTIONS:  - Monitor oral intake, urinary output, labs, and treatment plans  - Assess nutrition and hydration status and recommend course of action  - Evaluate amount of meals eaten  - Assist patient with eating if necessary   - Allow adequate time for meals  - Recommend/ encourage appropriate diets, oral nutritional supplements, and vitamin/mineral supplements  - Order, calculate, and assess calorie counts as needed  - Recommend, monitor, and adjust tube feedings and TPN/PPN based on assessed needs  - Assess need for intravenous fluids  - Provide specific nutrition/hydration education as appropriate  - Include patient/family/caregiver in decisions related to nutrition  Outcome: Progressing

## 2019-12-08 NOTE — H&P
H&P Exam - Critical Care   Dianne Terrell 80 y o  female MRN: [id number]  Unit/Bed#:  Encounter: [id number]      -------------------------------------------------------------------------------------------------------------  Chief Complaint: Bilateral PE    History of Present Illness   HX and PE limited by: Patient intubated and sedated  Eva Carlos is a 80 y o  female with a past medical history significant for HTN, HLD, and DMII who presents with acute hypoxic respiratory failure secondary to PE  She initially presented to the ED on 12/4 for worsening SOB  CT chest and CXR at that time were concerning for pneumonia and she was started on ceftriaxone and azithromycin  Azithromycin was later dced  During her admission her SOB became progressively worse requiring NC  A rapid response was called on 12/8 for acute worsening of her SPO2  She was having agonal respirations and was bradycardic and hypotensive  She was intubated and started on pressors, which were quickly titrated off  CTA was performed and showed significant bilateral PEs worse on R than L with enlarged RV  She was saturated on a heparin drip  Echo showed EF 65% with hypokinesis and reduced RV function  She was [facility] for IR thrombolysis        History obtained from chart review and unobtainable from patient due to sedation and intubation   -------------------------------------------------------------------------------------------------------------  Assessment and Plan:    Neuro:    Diagnosis: P/A/D  o Plan: Precedex gtt with prn fentanyl  o Goal RASS 0 to -2  o Daily CAM-ICU      CV:    Diagnosis: Pulmonary HTN and RV failure 2/2 bilateral PE, NSTEMI type 2, HTN, HLD  o Plan: IR thrombolysis   o continue Heparin gtt  o pravachol 40mg daily   o Holding home antihypertensives      Pulm:   Diagnosis: Acute hypoxic respiratory failure  o Plan: Continue mechanical ventilation   o Wean as tolerated       GI:    No active issues      :    Diagnosis: Frank in place  o Plan: srtict I/O, trend Cr      F/E/N:    Isolyte 75mL/hr   Trend and replete electrolytes as needed   NPO      Heme/Onc:    Diagnosis: Bilateral PE  o Plan: Heparin gtt and plan for IR thrombolysis        Endo:    Diagnosis: Type II DM  o Plan: Accucheck q6hrs with correctional insulin      ID:    Diagnosis: No active issues  o Plan: monitor WBC and fever off antibiotics        MSK/Skin:    Diagnosis: Sacral decub ulcers x2  o Plan: wound care consult  o Frequent repositioning and turning        Disposition: Continue Critical Care   Code Status: Level 1 - Full Code  --------------------------------------------------------------------------------------------------------------  Review of Systems   Unable to perform ROS: Intubated           Physical Exam   Constitutional:   Elderly female   HENT:   Head: Normocephalic and atraumatic  Cardiovascular: Normal rate, regular rhythm and normal heart sounds  Exam reveals no friction rub  No murmur heard  Pulmonary/Chest:   Intubated, mechanical breath sounds   Abdominal: Soft  Bowel sounds are normal  She exhibits no distension  Musculoskeletal: She exhibits no edema or deformity  Neurological:   Sedated, pinpoint pupils   Skin: Skin is warm and dry  Vitals reviewed      --------------------------------------------------------------------------------------------------------------  Historical Information   Past Medical History:   Diagnosis Date    Diabetes mellitus ([facility] [address] )     Hematuria     3OCT2017 RESOLVED    Hypertension      Past Surgical History:   Procedure Laterality Date    HYSTERECTOMY      REPLACEMENT TOTAL KNEE      TONSILLECTOMY       Social History   Social History     Substance and Sexual Activity   Alcohol Use Never    Frequency: Never    Binge frequency: Never     Social History     Substance and Sexual Activity   Drug Use No     Social History     Tobacco Use   Smoking Status Never Smoker Smokeless Tobacco Never Used       Family History:   Family History   Problem Relation Age of Onset    Cancer Family     Heart disease Family        Vitals:   Vitals:    12/08/19 1300 12/08/19 1315 12/08/19 1400 12/08/19 1500   BP: 102/65  106/65 111/61   BP Location:       Pulse: 78  80 80   Resp: (!) 42  (!) 27 (!) 27   Temp:       TempSrc:       SpO2: 98% 98% 99% 99%   Weight:       Height:         Temp  Min: 97 5 °F (36 4 °C)  Max: 99 7 °F (37 6 °C)  IBW: 50 1 kg  Height: 5' 2" (157 5 cm)  Body mass index is 27 42 kg/m²      Medications:  Current Facility-Administered Medications   Medication Dose Route Frequency    acetaminophen (TYLENOL) tablet 650 mg  650 mg Oral Q6H PRN    albuterol inhalation solution 2 5 mg  2 5 mg Nebulization Q4H PRN    calcium carbonate (TUMS) chewable tablet 1,000 mg  1,000 mg Oral Daily PRN    chlorhexidine (PERIDEX) 0 12 % oral rinse 15 mL  15 mL Swish & Spit Q12H [facility]    dexmedetomidine (PRECEDEX) 400 mcg in sodium chloride 0 9 % 100 mL infusion  0 1-0 7 mcg/kg/hr Intravenous Titrated    docusate sodium (COLACE) capsule 100 mg  100 mg Oral BID    fentanyl citrate (PF) 100 MCG/2ML 50 mcg  50 mcg Intravenous Q2H PRN    heparin (porcine) 25,000 units in 250 mL infusion (premix)  3-30 Units/kg/hr (Order-Specific) Intravenous Titrated    heparin (porcine) injection 2,600 Units  2,600 Units Intravenous PRN    heparin (porcine) injection 5,200 Units  5,200 Units Intravenous PRN    insulin lispro (HumaLOG) 100 units/mL subcutaneous injection 1-6 Units  1-6 Units Subcutaneous Q6H [facility]    multi-electrolyte (PLASMALYTE-A/ISOLYTE-S PH 7 4) IV solution  75 mL/hr Intravenous Continuous    ondansetron (ZOFRAN) injection 4 mg  4 mg Intravenous Q6H PRN    pantoprazole (PROTONIX) injection 40 mg  40 mg Intravenous Q24H Replaced by [facility]    pravastatin (PRAVACHOL) tablet 40 mg  40 mg Oral Daily With Dinner    sodium chloride (PF) 0 9 % injection 3 mL  3 mL Intravenous PRN     Home medications:  Prior to Admission Medications   Prescriptions Last Dose Informant Patient Reported? Taking? Cholecalciferol (VITAMIN D3) 2000 units capsule 12/3/2019 at Unknown time  Yes Yes   Sig: Take 1 capsule by mouth daily   amLODIPine (NORVASC) 5 mg tablet 12/3/2019 at Unknown time  No Yes   Sig: TAKE 1 TABLET EVERY DAY   glimepiride (AMARYL) 1 mg tablet 12/3/2019 at Unknown time  No Yes   Si/2 tab po QD   labetalol (NORMODYNE) 200 mg tablet 2019 at am only  No Yes   Sig: Take 1 tablet (200 mg total) by mouth 2 (two) times a day   simvastatin (ZOCOR) 20 mg tablet 12/3/2019 at Unknown time  No Yes   Sig: TAKE 1 TABLET EVERY DAY IN THE EVENING   spironolactone (ALDACTONE) 25 mg tablet 12/3/2019 at Unknown time  No Yes   Sig: TAKE 1 TABLET EVERY DAY      Facility-Administered Medications: None     Allergies:   Allergies   Allergen Reactions    Benazepril          Laboratory and Diagnostics:  Results from last 7 days   Lab Units 19  1152 19  0548 19  0500 19  0414 19  1313   WBC Thousand/uL 14 48* 12 50* 9 20 9 46  --  9 45   HEMOGLOBIN g/dL 14 3 13 6 14 0 14 1  --  15 7*   HEMATOCRIT % 43 9 42 3 42 5 43 6  --  47 6*   PLATELETS Thousands/uL 234 263 269 251 285 290   NEUTROS PCT %  --   --   --   --   --  85*   MONOS PCT %  --   --   --   --   --  5     Results from last 7 days   Lab Units 19  0735 19  0548 19  0500 19  0414 19  1313   SODIUM mmol/L 139 138 142 139 138   POTASSIUM mmol/L 4 5 4 1 4 1 4 3 5 1   CHLORIDE mmol/L 104 103 106 105 101   CO2 mmol/L 19* 21 23 24 28   ANION GAP mmol/L 16* 14* 13 10 9   BUN mg/dL 40* 38* 33* 33* 35*   CREATININE mg/dL 1 19 1 02 0 98 1 07 1 23   CALCIUM mg/dL 8 7 9 5 9 4 9 3 10 7*   GLUCOSE RANDOM mg/dL 263* 220* 164* 197* 269*   ALT U/L 74  --   --   --  25   AST U/L 89*  --   --   --  15   ALK PHOS U/L 159*  --   --   --  148*   ALBUMIN g/dL 2 6*  --   --   --  3 5   TOTAL BILIRUBIN mg/dL 1 00  --   --   --  0 70     Results from last 7 days   Lab Units 12/08/19  0735 12/05/19  0414   MAGNESIUM mg/dL 2 1 2 0   PHOSPHORUS mg/dL 4 1 3 4      Results from last 7 days   Lab Units 12/08/19  1151   INR  1 22*   PTT seconds 30      Results from last 7 days   Lab Units 12/05/19  0405 12/05/19  0025 12/04/19  2028 12/04/19  1657 12/04/19  1313   TROPONIN I ng/mL 0 25* 0 29* 0 31* 0 42* 0 42*     Results from last 7 days   Lab Units 12/08/19  0918 12/08/19  0704   LACTIC ACID mmol/L 1 8 2 6*     ABG:  Results from last 7 days   Lab Units 12/08/19  0704   PH ART  7 335*   PCO2 ART mm Hg 32 9*   PO2 ART mm Hg 76 4   HCO3 ART mmol/L 17 2*   BASE EXC ART mmol/L -7 6   ABG SOURCE  Radial, Left     VBG:  Results from last 7 days   Lab Units 12/08/19  0704   ABG SOURCE  Radial, Left     Results from last 7 days   Lab Units 12/06/19  0501 12/05/19  0414 12/04/19 2028   PROCALCITONIN ng/ml <0 05 <0 05 <0 05       Micro:  Results from last 7 days   Lab Units 12/04/19  2230   LEGIONELLA URINARY ANTIGEN  Negative   STREP PNEUMONIAE ANTIGEN, URINE  Negative         EKG: I have reviewed  Imaging: I have personally reviewed pertinent reports  ------------------------------------------------------------------------------------------------------------  Advance Directive and Living Will:      Power of :    POLST:    ------------------------------------------------------------------------------------------------------------  Anticipated Length of Stay is > 2 Ella MD [place]        Portions of the record may have been created with voice recognition software  Occasional wrong word or "sound a like" substitutions may have occurred due to the inherent limitations of voice recognition software    Read the chart carefully and recognize, using context, where substitutions have occurred

## 2019-12-08 NOTE — ASSESSMENT & PLAN NOTE
· POA, cr  1 23  · Resolved with fluids, currently back to baseline at 0 98  · Spirinolactone was held due to GERMAN, will resume at lower dose of 12 5 mg daily and monitor volume status  · Obtain BMP in the AM

## 2019-12-08 NOTE — PROGRESS NOTES
Progress Note - Renny Boxer Pipher 5/4/1928, 80 y o  female MRN: [id number]    Unit/Bed#: -01 Encounter: [id number]    Primary Care Provider: Dagmar Barney DO   Date and time admitted to hospital: 12/4/2019 12:27 PM      DOS: 12/7/2019    * SOB (shortness of breath)  Assessment & Plan  · Has been experiencing shortness of breath over the past few weeks  · CT scan likely (+) for pneumonia, continue IV abx treatment   · Resume home dose of spirinolactone, pt does not appear to be overtly overloaded   · If no improvement, consider ECHO/repeat CXR  · Continue supplemental O2, pt currently requiring 3 5-4 L with saturations in the 90s, improvement noted  monitor closely   · Will need home O2 eval on discharge     Community acquired pneumonia  Assessment & Plan  · Evidence on chest x-ray and chest CT  · Continue IV Ceftriaxone (day 4)  · Strep pneumo and legionella negative, therefore azithromycin was discontinued as patient also with mild prolonged QTc on EKG  · Consider addition of flouroquinolone if continued decline in respiratory status   · Pt is requiring 3 5-4 L of oxygen currently with O2 sats in the low 90s  Is not on oxygen at home   Will need home O2 eval once stable for d/c  · Procalcitonin x 2 negative, however due to continued shortness of breath and hypoxia, continue abx at this time  · Sputum culture not sent   · Influenza swab negative, respiratory panel pending   · Mucinex, pulmonary toileting, encourage incentive spirometry   · If pt continues to have more oxygen demand, consider obtaining ECHO/CXR, appears to be improving today     Abnormal finding on CT scan  Assessment & Plan  · Lung lesion noted on the left lower lobe with 13 mm solid component, will need repeat CT scan in 6-8 weeks to exclude neoplastic process  · Left thyroid mass noted with enlargement of the left thyroid lobe, will need follow up with non-urgent ultrasound   · Left adrenal enlargement, outpatient MRI with adrenal protocol recommended    Acute kidney injury ([facility] [address] )  Assessment & Plan  · POA, cr  1 23  · Resolved with fluids, currently back to baseline at 0 98  · Spirinolactone was held due to GERMAN, will resume at lower dose of 12 5 mg daily and monitor volume status  · Obtain BMP in the AM    Elevated troponin  Assessment & Plan  · Potentially in the setting of acute illness vs  GERMAN vs  Age   · Trop trending of 0 42/0 42/0 31/0 29, flatly decreased   · Pt denies any chest pain  · Shortness of breath likely secondary to acute pneumonia   · Consider obtaining ECHO if respiratory status does not improve  · Monitor    Type 2 diabetes mellitus with hyperglycemia [facility])  Assessment & Plan  Lab Results   Component Value Date    HGBA1C 7 0 (H) 12/04/2019       Recent Labs     12/06/19  2054 12/07/19  0637 12/07/19  1121 12/07/19  1533   POCGLU 200* 174* 194* 220*       Blood Sugar Average: Last 72 hrs:  (P) 558 [id number]   · Appears fairly well controlled as an outpatient as noted by hgbA1c  · Hold glimepiride while inpatient   · Continue SSI coverage, will increase algorithm and add bedtime coverage  Also add long acting Lantus 5 units QHS   · QID glucose checks  · Monitor     Hypertension  Assessment & Plan  · Appears controlled on review  · Continue amlodipine 5 mg daily, labetalol 200 mg BID  · Spirinolactone at 12 5 mg daily and monitor volume status  · Monitor     Hyperlipidemia  Assessment & Plan  · Continue statin      VTE Pharmacologic Prophylaxis:   Pharmacologic: Heparin  Mechanical VTE Prophylaxis in Place: Yes    Patient Centered Rounds: I have performed bedside rounds with nursing staff today  Discussions with Specialists or Other Care Team Provider: Discussed with Rn, CM and reviewed previous notes     Education and Discussions with Family / Patient: Discussed with patient and patient's daughter Nome [place] at bedside regarding plan of care  Time Spent for Care: 30 minutes    More than 50% of total time spent on counseling and coordination of care as described above  Current Length of Stay: 3 day(s)    Current Patient Status: Inpatient   Certification Statement: The patient will continue to require additional inpatient hospital stay due to continued monitoring of respiratory status, IV abx    Discharge Plan: Not medically stable as above, anticipate next 48 hours if continued improvement  Pt to go to [facility] on discharge once medically stable  Code Status: Level 1 - Full Code      Subjective:   Pt reports that she feels a little better today  Reports improvement with moving from the chair to the bathroom  Pt's daughter also reports she notices some improvement in the patient's breathing patterns  Pt denies any chest pain, abdominal pain, nausea or vomiting  Has no additional complaints at this time  Objective:     Vitals:   Temp (24hrs), Av 1 °F (36 7 °C), Min:98 °F (36 7 °C), Max:98 1 °F (36 7 °C)    Temp:  [98 °F (36 7 °C)-98 1 °F (36 7 °C)] 98 °F (36 7 °C)  HR:  [80-92] 92  Resp:  [18] 18  BP: (127-148)/(61-67) 148/67  SpO2:  [92 %-93 %] 92 %  Body mass index is 27 42 kg/m²  Input and Output Summary (last 24 hours): Intake/Output Summary (Last 24 hours) at 2019  Last data filed at 2019 1300  Gross per 24 hour   Intake 480 ml   Output    Net 480 ml       Physical Exam:     Physical Exam   Constitutional: No distress  Pt is in no acute distress sitting in her hospital chair resting comfortably accompanied by her daughter  NC in place at 4 L, saturating 91-94% during evaluation  Decreased conversational dyspnea noted  HENT:   Head: Normocephalic and atraumatic  Eyes: Pupils are equal, round, and reactive to light  Conjunctivae are normal    Cardiovascular: Normal rate, regular rhythm and intact distal pulses  Pulmonary/Chest: Effort normal and breath sounds normal  No stridor  No respiratory distress  She has no wheezes  Abdominal: Soft  Bowel sounds are normal  She exhibits no distension  There is no tenderness  There is no guarding  Musculoskeletal: She exhibits no edema  Appears euvolemic    Neurological: She is alert  Skin: Skin is warm and dry  She is not diaphoretic  No erythema  Psychiatric: She has a normal mood and affect  Vitals reviewed  Additional Data:     Labs:    Results from last 7 days   Lab Units 12/06/19  0500  12/04/19  1313   WBC Thousand/uL 9 20   < > 9 45   HEMOGLOBIN g/dL 14 0   < > 15 7*   HEMATOCRIT % 42 5   < > 47 6*   PLATELETS Thousands/uL 269   < > 290   NEUTROS PCT %  --   --  85*   LYMPHS PCT %  --   --  10*   MONOS PCT %  --   --  5   EOS PCT %  --   --  0    < > = values in this interval not displayed  Results from last 7 days   Lab Units 12/06/19  0500  12/04/19  1313   POTASSIUM mmol/L 4 1   < > 5 1   CHLORIDE mmol/L 106   < > 101   CO2 mmol/L 23   < > 28   BUN mg/dL 33*   < > 35*   CREATININE mg/dL 0 98   < > 1 23   CALCIUM mg/dL 9 4   < > 10 7*   ALK PHOS U/L  --   --  148*   ALT U/L  --   --  25   AST U/L  --   --  15    < > = values in this interval not displayed  * I Have Reviewed All Lab Data Listed Above  * Additional Pertinent Lab Tests Reviewed:  All Labs Within Last 24 Hours Reviewed    Imaging:    Imaging Reports Reviewed Today Include: CT chest   Imaging Personally Reviewed by Myself Includes:  None    Recent Cultures (last 7 days):     Results from last 7 days   Lab Units 12/04/19  2230   LEGIONELLA URINARY ANTIGEN  Negative       Last 24 Hours Medication List:     Current Facility-Administered Medications:  acetaminophen 650 mg Oral Q6H PRN Sesser Primmer, CRNP    albuterol 2 5 mg Nebulization Q4H PRN Linden Parish MD    amLODIPine 5 mg Oral Daily Sesser Primmer, CRNP    calcium carbonate 1,000 mg Oral Daily PRN Sesser Primmer, CRNP    cefTRIAXone 1,000 mg Intravenous Q24H Sesser Primmer, CRNP Last Rate: 1,000 mg (12/07/19 1643)   docusate sodium 100 mg Oral BID Sesser Primmer, CRNP    guaiFENesin 600 mg Oral BID OSIRIS Falcon    heparin (porcine) 5,000 Units Subcutaneous [facility] OSIRIS Falcon    insulin glargine 5 Units Subcutaneous HS Xochilt Kauffman PA-C    insulin lispro 1-5 Units Subcutaneous HS Xochilt Kauffman PA-C    insulin lispro 1-6 Units Subcutaneous TID AC Xochilt Kauffman PA-C    labetalol 200 mg Oral BID OSIRIS Falcon    ondansetron 4 mg Intravenous Q6H PRN OSIRIS Falcon    pravastatin 40 mg Oral Daily With [employer]    sodium chloride (PF) 3 mL Intravenous PRN Radha Spindle,     spironolactone 12 5 mg Oral Daily Camacho Francois PA-C         Today, Patient Was Seen By: Camacho Francois PA-C    ** Please Note: Dictation voice to text software may have been used in the creation of this document   **

## 2019-12-08 NOTE — PROCEDURES
Intubation  Date/Time: 12/8/2019 6:10 AM  Performed by: OSIRIS Marie  Authorized by: OSIRIS Marie     Patient location:  Bedside  Consent:     Consent obtained:  Emergent situation  Universal protocol:     Patient identity confirmed: Anonymous protocol, patient vented/unresponsive  Pre-procedure details:     Patient status:  Unresponsive    Mallampati score:  1    Pretreatment medications:  None    Paralytics:  None  Indications:     Indications for intubation: respiratory failure    Procedure details:     Preoxygenation:  Bag valve mask    CPR in progress: no      Intubation method:  Oral    Oral intubation technique:  Direct    Laryngoscope blade: Mac 3    Tube size (mm):  7 0    Tube type:  Cuffed    Number of attempts:  1    Cricoid pressure: no      Tube visualized through cords: yes    Placement assessment:     ETT to lip:  23    Tube secured with:  ETT varma    Breath sounds:  Equal and absent over the epigastrium    Placement verification: chest rise, CXR verification, equal breath sounds and ETCO2 detector      CXR findings:  ETT in proper place  Post-procedure details:     Patient tolerance of procedure:   Tolerated well, no immediate complications

## 2019-12-08 NOTE — DISCHARGE SUMMARY
Discharge Summary - Robb Yu 80 y o  female MRN: [id number]    Unit/Bed#:  Encounter: [id number]    Admission Date:   Admission Orders (From admission, onward)     Ordered        12/04/19 1655  Inpatient Admission  Once                     Admitting Diagnosis:   Pneumonia [J18 9]  SOB (shortness of breath) [R06 02]  Hypoxia [R09 02]  Elevated troponin [R79 89]    HPI/Summary of Hospital Course: Robb Yu is a 80year old female who [facility]/19 for evaluation of acute onset shortness of breathe x 3 days  Patient has a significant past medical history of hypertension, hyperlipidemia and DM II  Patient lives at home independently and is still an actively practicing beautician  Prior to this hospitalization she has been of relative good state of health  Per family over the past few weeks- 1 month they have noted easy fatigability and dyspnea with exertion  Shortness of breath progressively worsened leading up to admission prompting ED evaluation  Upon arrival to the emergency department patient was found to be hypoxemic  Chest CT was obtained revealing nodular areas of ground-glass of bilateral upper lobes concerning for atypical infiltrate  She was subsequently admitted to hospitalist service and treated for CAP  During hospitalization patient has remained afebrile without acute leukocytosis and to consecutively negative procalcitonin  Despite intervention she remained persistently hypoxemic and pendant on O2 therapy  After ambulating to the bathroom early this morning patient experienced acute decompensation and profound hypoxemia  Rapid response was called  Upon arrival patient with agonal respirations  She was concurrently bradycardic and transiently hypotensive  Patient was emergently intubated  Post intubation she required brief period of vasopressor support  She was subsequently transferred to ICU for further medical management and evaluation    CTA was obtained revealing extensive bilateral pulmonary emboli with right subclavian thrombus with extension to SVC  Patient systemically anticoagulated via vte high protocol  Echocardiogram obtained revealing severely dilated right ventricle and positive Gold's sign  PA pressure is estimated at 60 mmHg suggestive of severe pulmonary artery hypertension  Case discussed with Interventional Radiology who were amendable to catheter directed lysis  Extensive discussion held with family regarding advanced treatment therapy, as well as no agrresive therapy at all  At time patient a able to participate in discussion  She is agreeable for transfer with catheter directed lysis at [facility]  Case discussed with ROMEO thomas at [facility] who are in agreement with plan    Procedures Performed:   Orders Placed This Encounter   Procedures    ED ECG Documentation Only    Intubation       Significant Findings, Care, Treatment and Services Provided:   12/4: CT Chest  12/8: CTA  07/8: ECHO    Complications: N/A    Discharge Diagnosis:   Acute hypoxemic respiratory failure  Bilateral PE  RV failure  Pulmonary artery hypertension  Troponinemia  Lactic acidosis  GERMAN-resolved  Hypertension  Hyperlipidemia  DMII  Sacral decubiti x 2 POA  Thyroid mass      Resolved Problems  Date Reviewed: 12/7/2019    None          Condition at Discharge: critical         Discharge instructions/Information to patient and family:   See after visit summary for information provided to patient and family  Provisions for Follow-Up Care:  See after visit summary for information related to follow-up care and any pertinent home health orders  PCP: Shelly Costa DO    Disposition:  Discharge readmitt    Planned Readmission: Yes [facility]          Discharge Statement   I spent  minutes discharging the patient  This time was spent on the day of discharge  I had direct contact with the patient on the day of discharge   Additional documentation is required if more than 30 minutes were spent on discharge  Discharge Medications:  See after visit summary for reconciled discharge medications provided to patient and family

## 2019-12-08 NOTE — PLAN OF CARE
Problem: Potential for Falls  Goal: Patient will remain free of falls  Description  INTERVENTIONS:  - Assess patient frequently for physical needs  -  Identify cognitive and physical deficits and behaviors that affect risk of falls    -  [place] fall precautions as indicated by assessment   - Educate patient/family on patient safety including physical limitations  - Instruct patient to call for assistance with activity based on assessment  - Modify environment to reduce risk of injury  - Consider OT/PT consult to assist with strengthening/mobility  Outcome: Progressing     Problem: PAIN - ADULT  Goal: Verbalizes/displays adequate comfort level or baseline comfort level  Description  Interventions:  - Encourage patient to monitor pain and request assistance  - Assess pain using appropriate pain scale  - Administer analgesics based on type and severity of pain and evaluate response  - Implement non-pharmacological measures as appropriate and evaluate response  - Consider cultural and social influences on pain and pain management  - Notify physician/advanced practitioner if interventions unsuccessful or patient reports new pain  Outcome: Progressing     Problem: INFECTION - ADULT  Goal: Absence or prevention of progression during hospitalization  Description  INTERVENTIONS:  - Assess and monitor for signs and symptoms of infection  - Monitor lab/diagnostic results  - Monitor all insertion sites, i e  indwelling lines, tubes, and drains  - Monitor endotracheal if appropriate and nasal secretions for changes in amount and color  - [place] appropriate cooling/warming therapies per order  - Administer medications as ordered  - Instruct and encourage patient and family to use good hand hygiene technique  - Identify and instruct in appropriate isolation precautions for identified infection/condition  Outcome: Progressing     Problem: SAFETY ADULT  Goal: Maintain or return to baseline ADL function  Description  INTERVENTIONS:  -  Assess patient's ability to carry out ADLs; assess patient's baseline for ADL function and identify physical deficits which impact ability to perform ADLs (bathing, care of mouth/teeth, toileting, grooming, dressing, etc )  - Assess/evaluate cause of self-care deficits   - Assess range of motion  - Assess patient's mobility; develop plan if impaired  - Assess patient's need for assistive devices and provide as appropriate  - Encourage maximum independence but intervene and supervise when necessary  - Involve family in performance of ADLs  - Assess for home care needs following discharge   - Consider OT consult to assist with ADL evaluation and planning for discharge  - Provide patient education as appropriate  Outcome: Progressing  Goal: Maintain or return mobility status to optimal level  Description  INTERVENTIONS:  - Assess patient's baseline mobility status (ambulation, transfers, stairs, etc )    - Identify cognitive and physical deficits and behaviors that affect mobility  - Identify mobility aids required to assist with transfers and/or ambulation (gait belt, sit-to-stand, lift, walker, cane, etc )  - [place] fall precautions as indicated by assessment  - Record patient progress and toleration of activity level on Mobility SBAR; progress patient to next Phase/Stage  - Instruct patient to call for assistance with activity based on assessment  - Consider rehabilitation consult to assist with strengthening/weightbearing, etc   Outcome: Progressing     Problem: DISCHARGE PLANNING  Goal: Discharge to home or other facility with appropriate resources  Description  INTERVENTIONS:  - Identify barriers to discharge w/patient and caregiver  - Arrange for needed discharge resources and transportation as appropriate  - Identify discharge learning needs (meds, wound care, etc )  - Arrange for interpretive services to assist at discharge as needed  - Refer to Case Management Department for coordinating discharge planning if the patient needs post-hospital services based on physician/advanced practitioner order or complex needs related to functional status, cognitive ability, or social support system  Outcome: Progressing     Problem: Knowledge Deficit  Goal: Patient/family/caregiver demonstrates understanding of disease process, treatment plan, medications, and discharge instructions  Description  Complete learning assessment and assess knowledge base    Interventions:  - Provide teaching at level of understanding  - Provide teaching via preferred learning methods  Outcome: Progressing     Problem: CARDIOVASCULAR - ADULT  Goal: Maintains optimal cardiac output and hemodynamic stability  Description  INTERVENTIONS:  - Monitor I/O, vital signs and rhythm  - Monitor for S/S and trends of decreased cardiac output  - Administer and titrate ordered vasoactive medications to optimize hemodynamic stability  - Assess quality of pulses, skin color and temperature  - Assess for signs of decreased coronary artery perfusion  - Instruct patient to report change in severity of symptoms  Outcome: Progressing     Problem: RESPIRATORY - ADULT  Goal: Achieves optimal ventilation and oxygenation  Description  INTERVENTIONS:  - Assess for changes in respiratory status  - Assess for changes in mentation and behavior  - Position to facilitate oxygenation and minimize respiratory effort  - Oxygen administered by appropriate delivery if ordered  - Initiate smoking cessation education as indicated  - Encourage broncho-pulmonary hygiene including cough, deep breathe, Incentive Spirometry  - Assess the need for suctioning and aspirate as needed  - Assess and instruct to report SOB or any respiratory difficulty  - Respiratory Therapy support as indicated  Outcome: Progressing     Problem: Prexisting or High Potential for Compromised Skin Integrity  Goal: Skin integrity is maintained or improved  Description  INTERVENTIONS:  - Identify patients at risk for skin breakdown  - Assess and monitor skin integrity  - Assess and monitor nutrition and hydration status  - Monitor labs   - Assess for incontinence   - Turn and reposition patient  - Assist with mobility/ambulation  - Relieve pressure over bony prominences  - Avoid friction and shearing  - Provide appropriate hygiene as needed including keeping skin clean and dry  - Evaluate need for skin moisturizer/barrier cream  - Collaborate with interdisciplinary team   - Patient/family teaching  - Consider wound care consult   Outcome: Progressing

## 2019-12-08 NOTE — PROGRESS NOTES
Daily Progress Note - Critical Care   Alva Terrell 80 y o  female MRN: [id number]  Unit/Bed#:  Encounter: [id number]        ----------------------------------------------------------------------------------------  HPI/24hr events: Patient acutely decompensated after ambulation to bathroom  Patient linda hypoxemic respiratory arrest   Intubated emergently upon arrival   She required brief period of vasopressor support which was quickly titrated to off  She was subsequently transferred to ICU for further medical management     ---------------------------------------------------------------------------------------  SUBJECTIVE  Intubated and sedated    Review of Systems   Unable to perform ROS: Intubated       ---------------------------------------------------------------------------------------  Assessment and Plan:    Neuro:    Plan:  Analgesia/sedation  Continue Precedex gtt to achieve goal RASS 0 to -2 fentanyl 15 mcg q 2 hours p r n  Breakthrough agitation   Delirium precautions  Regulate sleep-wake cycles  Daily CAM ICU  CV:    Diagnosis:  PAH and RV failure in setting of extensive bilateral PE   o Plan: Heparin gtt VTE high protocol   Check bilateral lower extremity VAS duplex  Continue close hemodynamic monitoring  Follow up consultation with CT surgery and IR     NSTEMI  2/2 to demand  o Plan: Troponin peaked and now down trending  Follow-up echocardiogram   Diagnosis: HTN  o Plan: Hold antihypertensive regimen   HLD:  Pravachol 40 mg daily  Pulm:   Diagnosis:  Acute hypoxemic respiratory failure  o Plan:  Continue mechanical ventilation at lung protective volumes  Daily SBT  VAP protocol  GI:    Diagnosis: No acute issues  o Plan: GI ppx/ Bowel regimen    :    Diagnosis:  GERMAN-resolved  o Plan:  Strict I&Os  Trend daily BUN/creatinine  Maintain Frank for accurate I&Os    F/E/N:    Plan:  Gentle IV fluid hydration isolate 75 mL/hour       Heme/Onc:    Diagnosis: Bilateral PE    o Plan:  Anticoagulation as noted above  Endo:    Diagnosis: DM II  o Plan:  Sliding scale insulin coverage as needed  Q 6 hours Accu-Cheks  ID:    Diagnosis: Low suspicion for infectious process in setting of negative procalcitonin x2   o Plan:  Stop antibiotics  Trend fever curve and WBC count  MSK/Skin:    Diagnosis: Sacral Decubitus x 2 POA  o Plan:  Frequent offloading repositioning with skin breakdown  Wound care consultation  o PT/OT when applicable  Disposition: Continue Critical Care   Code Status: Level 1 - Full Code  ---------------------------------------------------------------------------------------  ICU CORE MEASURES    Prophylaxis   VTE Pharmacologic Prophylaxis: Heparin Drip  VTE Mechanical Prophylaxis: sequential compression device  Stress Ulcer Prophylaxis: Pantoprazole IV     ABCDE Protocol (if indicated)  Plan to perform spontaneous awakening trial today? YES  Plan to perform spontaneous breathing trial today? Yes  Obvious barriers to extubation? Yes  CAM-ICU: Negative    Invasive Devices Review  Invasive Devices     Peripheral Intravenous Line            Peripheral IV 12/04/19 Right Antecubital 4 days    Peripheral IV 12/08/19 Right Wrist less than 1 day    Peripheral IV 12/08/19 Right;Ventral (anterior) Forearm less than 1 day          Drain            NG/OG/Enteral Tube Orogastric 14 Fr Right mouth less than 1 day    Urethral Catheter less than 1 day          Airway            ETT  Cuffed 7 mm less than 1 day              Can any invasive devices be discontinued today? No (provide explanation): Continue shital  ---------------------------------------------------------------------------------------  OBJECTIVE    Physical Exam   Constitutional: No distress  HENT:   Head: Normocephalic and atraumatic  Eyes: Pupils are equal, round, and reactive to light  EOM are normal    Neck: Normal range of motion  Neck supple  No JVD present     Cardiovascular: Normal rate and regular rhythm  No murmur heard  Pulmonary/Chest: No stridor  No respiratory distress  She has no wheezes  Distant breath sounds bilaterally  Abdominal: Soft  Bowel sounds are normal  She exhibits no distension  There is no tenderness  There is no guarding  Musculoskeletal: Normal range of motion  She exhibits no edema  Neurological: She is alert  Patient is intubated and sedated  She is easily arouse  She moves all extremities with purpose and without difficulty  Appropriately follows commands   Skin: Skin is warm  Two stage I sacral decubiti    Vitals reviewed  Vitals   Vitals:    19 0900 19 1000 19 1100 19 1315   BP: 103/57 93/52 115/62    BP Location:   Right arm    Pulse: 77 78 79    Resp:  22 (!) 23    Temp:   99 7 °F (37 6 °C)    TempSrc:   Oral    SpO2: 98% 99% 99% 98%   Weight:       Height:         Temp (24hrs), Av 7 °F (37 1 °C), Min:98 1 °F (36 7 °C), Max:99 7 °F (37 6 °C)  Current: Temperature: 99 7 °F (37 6 °C)      Invasive/non-invasive ventilation settings   Respiratory    Lab Data (Last 4 hours)    None         O2/Vent Data (Last 4 hours)       1115          Drager Vent Mode AC/VC+       Resp Rate (BPM) (BPM) 16       VT (mL) (mL) 360       Insp Time (S) (S) 0 8       FIO2 (%) (%) 65       PEEP (cmH2O) (cmH2O) 8       Rise Time (%) (%) 0 2       MV (Obs) 8 18                   Height and Weights   Height: 5' 2" (157 5 cm)  IBW: 50 1 kg  Body mass index is 27 42 kg/m²  Weight (last 2 days)     None            Intake and Output  I/O        0701 -  0700  07 -  07 07 -  0700    P  O  180 480     I V  (mL/kg)  1000 2 (14 7) 328 7 (4 8)    Total Intake(mL/kg) 180 (2 6) 1480 2 (21 8) 328 7 (4 8)    Urine (mL/kg/hr)   70 (0 2)    Total Output   70    Net +180 +1480 2 +258 7           Unmeasured Urine Occurrence 2 x            Nutrition       Diet Orders   (From admission, onward)             Start     Ordered 12/04/19 1917  Diet Baljinder/CHO Controlled; Consistent Carbohydrate Diet Level 2 (5 carb servings/75 grams CHO/meal); Sodium 4 GM (ASHLEY)  Diet effective now     Question Answer Comment   Diet Type Baljinder/CHO Controlled    Baljinder/CHO Controlled Consistent Carbohydrate Diet Level 2 (5 carb servings/75 grams CHO/meal)    Other Restriction(s): Sodium 4 GM (ASHLEY)    RD to adjust diet per protocol?  Yes        12/04/19 1916                Laboratory and Diagnostics:  Results from last 7 days   Lab Units 12/08/19  1152 12/08/19  0548 12/06/19  0500 12/05/19  0414 12/04/19 2028 12/04/19  1313   WBC Thousand/uL 14 48* 12 50* 9 20 9 46  --  9 45   HEMOGLOBIN g/dL 14 3 13 6 14 0 14 1  --  15 7*   HEMATOCRIT % 43 9 42 3 42 5 43 6  --  47 6*   PLATELETS Thousands/uL 234 263 269 251 285 290   NEUTROS PCT %  --   --   --   --   --  85*   MONOS PCT %  --   --   --   --   --  5     Results from last 7 days   Lab Units 12/08/19  0735 12/08/19  0548 12/06/19  0500 12/05/19  0414 12/04/19  1313   SODIUM mmol/L 139 138 142 139 138   POTASSIUM mmol/L 4 5 4 1 4 1 4 3 5 1   CHLORIDE mmol/L 104 103 106 105 101   CO2 mmol/L 19* 21 23 24 28   ANION GAP mmol/L 16* 14* 13 10 9   BUN mg/dL 40* 38* 33* 33* 35*   CREATININE mg/dL 1 19 1 02 0 98 1 07 1 23   CALCIUM mg/dL 8 7 9 5 9 4 9 3 10 7*   GLUCOSE RANDOM mg/dL 263* 220* 164* 197* 269*   ALT U/L 74  --   --   --  25   AST U/L 89*  --   --   --  15   ALK PHOS U/L 159*  --   --   --  148*   ALBUMIN g/dL 2 6*  --   --   --  3 5   TOTAL BILIRUBIN mg/dL 1 00  --   --   --  0 70     Results from last 7 days   Lab Units 12/08/19  0735 12/05/19  0414   MAGNESIUM mg/dL 2 1 2 0   PHOSPHORUS mg/dL 4 1 3 4      Results from last 7 days   Lab Units 12/08/19  1151   INR  1 22*   PTT seconds 30      Results from last 7 days   Lab Units 12/05/19  0405 12/05/19  0025 12/04/19  2028 12/04/19  1657 12/04/19  1313   TROPONIN I ng/mL 0 25* 0 29* 0 31* 0 42* 0 42*     Results from last 7 days   Lab Units 12/08/19  6861 12/08/19  0704   LACTIC ACID mmol/L 1 8 2 6*     ABG:  Results from last 7 days   Lab Units 12/08/19  0704   PH ART  7 335*   PCO2 ART mm Hg 32 9*   PO2 ART mm Hg 76 4   HCO3 ART mmol/L 17 2*   BASE EXC ART mmol/L -7 6   ABG SOURCE  Radial, Left     VBG:  Results from last 7 days   Lab Units 12/08/19  0704   ABG SOURCE  Radial, Left     Results from last 7 days   Lab Units 12/06/19  0501 12/05/19  0414 12/04/19 2028   PROCALCITONIN ng/ml <0 05 <0 05 <0 05       Micro  Results from last 7 days   Lab Units 12/04/19  2230   LEGIONELLA URINARY ANTIGEN  Negative   STREP PNEUMONIAE ANTIGEN, URINE  Negative       EKG:   Imaging:  I have personally reviewed pertinent reports     and I have personally reviewed pertinent films in PACS    Active Medications  Scheduled Meds:  Current Facility-Administered Medications:  acetaminophen 650 mg Oral Q6H PRN SHAWN Henderson-C    albuterol 2 5 mg Nebulization Q4H PRN SHAWN Henderson-SERGEI    calcium carbonate 1,000 mg Oral Daily PRN SHAWN Henderson-C    chlorhexidine 15 mL Swish & Spit Q12H [facility] Nayana Brunson PA-C    dexmedetomidine 0 1-0 7 mcg/kg/hr Intravenous Titrated SHAWN Henderson-C Last Rate: 1 mcg/kg/hr (12/08/19 1303)   docusate sodium 100 mg Oral BID SHAWN Henderson-C    fentanyl citrate (PF) 50 mcg Intravenous Q2H PRN SHAWN Henderson-SERGEI    heparin (porcine) 3-30 Units/kg/hr (Order-Specific) Intravenous Titrated Nayana Brunson PA-C Last Rate: 18 Units/kg/hr (12/08/19 1136)   heparin (porcine) 2,600 Units Intravenous PRN SHAWN Henderson-SERGEI    heparin (porcine) 5,200 Units Intravenous PRN SHAWN Henderson-C    insulin lispro 1-6 Units Subcutaneous Q6H [facility] Nayana Brunson PA-C    multi-electrolyte 75 mL/hr Intravenous Continuous Chriss Henderson Last Rate: 75 mL/hr (12/08/19 7954)   ondansetron 4 mg Intravenous Q6H PRN Nayana Brunson PA-C    pravastatin 40 mg Oral Daily With [employer] navarrete    sodium chloride (PF) 3 mL Intravenous PRN Nayana Brunson PA-C      Continuous Infusions:    dexmedetomidine 0 1-0 7 mcg/kg/hr Last Rate: 1 mcg/kg/hr (12/08/19 1303)   heparin (porcine) 3-30 Units/kg/hr (Order-Specific) Last Rate: 18 Units/kg/hr (12/08/19 1136)   multi-electrolyte 75 mL/hr Last Rate: 75 mL/hr (12/08/19 0824)     PRN Meds:     acetaminophen 650 mg Q6H PRN   albuterol 2 5 mg Q4H PRN   calcium carbonate 1,000 mg Daily PRN   fentanyl citrate (PF) 50 mcg Q2H PRN   heparin (porcine) 2,600 Units PRN   heparin (porcine) 5,200 Units PRN   ondansetron 4 mg Q6H PRN   sodium chloride (PF) 3 mL PRN       Allergies   Allergies   Allergen Reactions    Benazepril        Advance Directive and Living Will:      Power of :    POLST:        Counseling / Coordination of Care  Total Critical Care time spent 40 minutes excluding procedures, teaching and family updates  Edison Singh PA-C        Portions of the record may have been created with voice recognition software  Occasional wrong word or "sound a like" substitutions may have occurred due to the inherent limitations of voice recognition software    Read the chart carefully and recognize, using context, where substitutions have occurred

## 2019-12-08 NOTE — ASSESSMENT & PLAN NOTE
· Evidence on chest x-ray and chest CT  · Continue IV Ceftriaxone (day 4)  · Strep pneumo and legionella negative, therefore azithromycin was discontinued as patient also with mild prolonged QTc on EKG  · Consider addition of flouroquinolone if continued decline in respiratory status   · Pt is requiring 3 5-4 L of oxygen currently with O2 sats in the low 90s  Is not on oxygen at home   Will need home O2 eval once stable for d/c  · Procalcitonin x 2 negative, however due to continued shortness of breath and hypoxia, continue abx at this time  · Sputum culture not sent   · Influenza swab negative, respiratory panel pending   · Mucinex, pulmonary toileting, encourage incentive spirometry   · If pt continues to have more oxygen demand, consider obtaining ECHO/CXR, appears to be improving today

## 2019-12-08 NOTE — INTERVAL H&P NOTE
Update: (This section must be completed if the H&P was completed greater than 24 hrs to procedure or admission)    H&P reviewed  After examining the patient, I find no changed to the H&P since it had been written  Patient re-evaluated  Accept as history and physical     Assessment/Plan: Bilateral pulmonary embolism with right heart failure requiring intubation for catheter directed thrombolysis  Risks of procedure explained to daughters, including the risk of thrombus dislodgement from the superior vena cava resulting in worsening respiratory distress and/or death      Tiera Min MD/December 8, 2019/5:54 PM

## 2019-12-08 NOTE — RESPIRATORY THERAPY NOTE
12/08/19 0628   Respiratory Assessment   Assessment Type Assess only   General Appearance Alert; Awake   Respiratory Pattern Normal   Chest Assessment Chest expansion symmetrical   Bilateral Breath Sounds Diminished;Clear   Suction ET Tube   Resp Comments recieved pt from 2nd floor post code/resp arrest placed on vent AC 17/360/+5100% pt very alert gentry well    O2 Device vent    Airway Suctioning/Secretions   Suction Type Endotracheal tube   Suction Device Inline   Secretion Amount None   Suction Tolerance Tolerated well   Suctioning Adverse Effects None   ETT  Cuffed 7 mm   Placement Date/Time: 12/08/19 (c) 0610   Type: Cuffed  Tube Size: 7 mm  Location: Oral  Insertion attempts: 1  Placement Verification: Chest x-ray;End tidal CO2;Symmetrical chest wall movement  Secured at (cm): 23   Secured at (cm) 22   Measured from Lips   Secured Location Left   Secured by Commercial tube varma  (skin intact )   Site Condition Dry   Cuff Pressure (cm H2O) 24 cm H2O   HI-LO Suction  Continuous low suction   HI-LO Secretions Scant   HI-LO Intervention Patent   Additional Assessments   Pulse 85   Respirations 20   SpO2 99 %   Position Semi-Salomon's

## 2019-12-08 NOTE — H&P (VIEW-ONLY)
H&P Exam - Critical Care   Laverne Terrell 80 y o  female MRN: [id number]  Unit/Bed#:  Encounter: [id number]      -------------------------------------------------------------------------------------------------------------  Chief Complaint: Bilateral PE    History of Present Illness   HX and PE limited by: Patient intubated and sedated  Nick Shrestha is a 80 y o  female with a past medical history significant for HTN, HLD, and DMII who presents with acute hypoxic respiratory failure secondary to PE  She initially presented to the ED on 12/4 for worsening SOB  CT chest and CXR at that time were concerning for pneumonia and she was started on ceftriaxone and azithromycin  Azithromycin was later dced  During her admission her SOB became progressively worse requiring NC  A rapid response was called on 12/8 for acute worsening of her SPO2  She was having agonal respirations and was bradycardic and hypotensive  She was intubated and started on pressors, which were quickly titrated off  CTA was performed and showed significant bilateral PEs worse on R than L with enlarged RV  She was saturated on a heparin drip  Echo showed EF 65% with hypokinesis and reduced RV function  She was transferred to [facility] for IR thrombolysis        History obtained from chart review and unobtainable from patient due to sedation and intubation   -------------------------------------------------------------------------------------------------------------  Assessment and Plan:    Neuro:    Diagnosis: P/A/D  o Plan: Precedex gtt with prn fentanyl  o Goal RASS 0 to -2  o Daily CAM-ICU      CV:    Diagnosis: Pulmonary HTN and RV failure 2/2 bilateral PE, NSTEMI type 2, HTN, HLD  o Plan: IR thrombolysis   o continue Heparin gtt  o pravachol 40mg daily   o Holding home antihypertensives      Pulm:   Diagnosis: Acute hypoxic respiratory failure  o Plan: Continue mechanical ventilation   o Wean as tolerated       GI:    No active issues      :    Diagnosis: Frank in place  o Plan: srtict I/O, trend Cr      F/E/N:    Isolyte 75mL/hr   Trend and replete electrolytes as needed   NPO      Heme/Onc:    Diagnosis: Bilateral PE  o Plan: Heparin gtt and plan for IR thrombolysis        Endo:    Diagnosis: Type II DM  o Plan: Accucheck q6hrs with correctional insulin      ID:    Diagnosis: No active issues  o Plan: monitor WBC and fever off antibiotics        MSK/Skin:    Diagnosis: Sacral decub ulcers x2  o Plan: wound care consult  o Frequent repositioning and turning        Disposition: Continue Critical Care   Code Status: Level 1 - Full Code  --------------------------------------------------------------------------------------------------------------  Review of Systems   Unable to perform ROS: Intubated           Physical Exam   Constitutional:   Elderly female   HENT:   Head: Normocephalic and atraumatic  Cardiovascular: Normal rate, regular rhythm and normal heart sounds  Exam reveals no friction rub  No murmur heard  Pulmonary/Chest:   Intubated, mechanical breath sounds   Abdominal: Soft  Bowel sounds are normal  She exhibits no distension  Musculoskeletal: She exhibits no edema or deformity  Neurological:   Sedated, pinpoint pupils   Skin: Skin is warm and dry  Vitals reviewed      --------------------------------------------------------------------------------------------------------------  Historical Information   Past Medical History:   Diagnosis Date    Diabetes mellitus ([facility] [address] )     Hematuria     3OCT2017 RESOLVED    Hypertension      Past Surgical History:   Procedure Laterality Date    HYSTERECTOMY      REPLACEMENT TOTAL KNEE      TONSILLECTOMY       Social History   Social History     Substance and Sexual Activity   Alcohol Use Never    Frequency: Never    Binge frequency: Never     Social History     Substance and Sexual Activity   Drug Use No     Social History     Tobacco Use   Smoking Status Never Smoker Smokeless Tobacco Never Used       Family History:   Family History   Problem Relation Age of Onset    Cancer Family     Heart disease Family        Vitals:   Vitals:    12/08/19 1300 12/08/19 1315 12/08/19 1400 12/08/19 1500   BP: 102/65  106/65 111/61   BP Location:       Pulse: 78  80 80   Resp: (!) 42  (!) 27 (!) 27   Temp:       TempSrc:       SpO2: 98% 98% 99% 99%   Weight:       Height:         Temp  Min: 97 5 °F (36 4 °C)  Max: 99 7 °F (37 6 °C)  IBW: 50 1 kg  Height: 5' 2" (157 5 cm)  Body mass index is 27 42 kg/m²      Medications:  Current Facility-Administered Medications   Medication Dose Route Frequency    acetaminophen (TYLENOL) tablet 650 mg  650 mg Oral Q6H PRN    albuterol inhalation solution 2 5 mg  2 5 mg Nebulization Q4H PRN    calcium carbonate (TUMS) chewable tablet 1,000 mg  1,000 mg Oral Daily PRN    chlorhexidine (PERIDEX) 0 12 % oral rinse 15 mL  15 mL Swish & Spit Q12H [address]    dexmedetomidine (PRECEDEX) 400 mcg in sodium chloride 0 9 % 100 mL infusion  0 1-0 7 mcg/kg/hr Intravenous Titrated    docusate sodium (COLACE) capsule 100 mg  100 mg Oral BID    fentanyl citrate (PF) 100 MCG/2ML 50 mcg  50 mcg Intravenous Q2H PRN    heparin (porcine) 25,000 units in 250 mL infusion (premix)  3-30 Units/kg/hr (Order-Specific) Intravenous Titrated    heparin (porcine) injection 2,600 Units  2,600 Units Intravenous PRN    heparin (porcine) injection 5,200 Units  5,200 Units Intravenous PRN    insulin lispro (HumaLOG) 100 units/mL subcutaneous injection 1-6 Units  1-6 Units Subcutaneous Q6H [address]    multi-electrolyte (PLASMALYTE-A/ISOLYTE-S PH 7 4) IV solution  75 mL/hr Intravenous Continuous    ondansetron (ZOFRAN) injection 4 mg  4 mg Intravenous Q6H PRN    pantoprazole (PROTONIX) injection 40 mg  40 mg Intravenous Q24H [facility]    pravastatin (PRAVACHOL) tablet 40 mg  40 mg Oral Daily With Dinner    sodium chloride (PF) 0 9 % injection 3 mL  3 mL Intravenous PRN     Home medications:  Prior to Admission Medications   Prescriptions Last Dose Informant Patient Reported? Taking? Cholecalciferol (VITAMIN D3) 2000 units capsule 12/3/2019 at Unknown time  Yes Yes   Sig: Take 1 capsule by mouth daily   amLODIPine (NORVASC) 5 mg tablet 12/3/2019 at Unknown time  No Yes   Sig: TAKE 1 TABLET EVERY DAY   glimepiride (AMARYL) 1 mg tablet 12/3/2019 at Unknown time  No Yes   Si/2 tab po QD   labetalol (NORMODYNE) 200 mg tablet 2019 at am only  No Yes   Sig: Take 1 tablet (200 mg total) by mouth 2 (two) times a day   simvastatin (ZOCOR) 20 mg tablet 12/3/2019 at Unknown time  No Yes   Sig: TAKE 1 TABLET EVERY DAY IN THE EVENING   spironolactone (ALDACTONE) 25 mg tablet 12/3/2019 at Unknown time  No Yes   Sig: TAKE 1 TABLET EVERY DAY      Facility-Administered Medications: None     Allergies:   Allergies   Allergen Reactions    Benazepril          Laboratory and Diagnostics:  Results from last 7 days   Lab Units 19  1152 19  0548 19  0500 19  0414 19  1313   WBC Thousand/uL 14 48* 12 50* 9 20 9 46  --  9 45   HEMOGLOBIN g/dL 14 3 13 6 14 0 14 1  --  15 7*   HEMATOCRIT % 43 9 42 3 42 5 43 6  --  47 6*   PLATELETS Thousands/uL 234 263 269 251 285 290   NEUTROS PCT %  --   --   --   --   --  85*   MONOS PCT %  --   --   --   --   --  5     Results from last 7 days   Lab Units 19  0735 19  0548 19  0500 19  0414 19  1313   SODIUM mmol/L 139 138 142 139 138   POTASSIUM mmol/L 4 5 4 1 4 1 4 3 5 1   CHLORIDE mmol/L 104 103 106 105 101   CO2 mmol/L 19* 21 23 24 28   ANION GAP mmol/L 16* 14* 13 10 9   BUN mg/dL 40* 38* 33* 33* 35*   CREATININE mg/dL 1 19 1 02 0 98 1 07 1 23   CALCIUM mg/dL 8 7 9 5 9 4 9 3 10 7*   GLUCOSE RANDOM mg/dL 263* 220* 164* 197* 269*   ALT U/L 74  --   --   --  25   AST U/L 89*  --   --   --  15   ALK PHOS U/L 159*  --   --   --  148*   ALBUMIN g/dL 2 6*  --   --   --  3 5   TOTAL BILIRUBIN mg/dL 1 00  --   --   --  0 70     Results from last 7 days   Lab Units 12/08/19  0735 12/05/19  0414   MAGNESIUM mg/dL 2 1 2 0   PHOSPHORUS mg/dL 4 1 3 4      Results from last 7 days   Lab Units 12/08/19  1151   INR  1 22*   PTT seconds 30      Results from last 7 days   Lab Units 12/05/19  0405 12/05/19  0025 12/04/19  2028 12/04/19  1657 12/04/19  1313   TROPONIN I ng/mL 0 25* 0 29* 0 31* 0 42* 0 42*     Results from last 7 days   Lab Units 12/08/19  0918 12/08/19  0704   LACTIC ACID mmol/L 1 8 2 6*     ABG:  Results from last 7 days   Lab Units 12/08/19  0704   PH ART  7 335*   PCO2 ART mm Hg 32 9*   PO2 ART mm Hg 76 4   HCO3 ART mmol/L 17 2*   BASE EXC ART mmol/L -7 6   ABG SOURCE  Radial, Left     VBG:  Results from last 7 days   Lab Units 12/08/19  0704   ABG SOURCE  Radial, Left     Results from last 7 days   Lab Units 12/06/19  0501 12/05/19  0414 12/04/19 2028   PROCALCITONIN ng/ml <0 05 <0 05 <0 05       Micro:  Results from last 7 days   Lab Units 12/04/19  2230   LEGIONELLA URINARY ANTIGEN  Negative   STREP PNEUMONIAE ANTIGEN, URINE  Negative         EKG: I have reviewed  Imaging: I have personally reviewed pertinent reports  ------------------------------------------------------------------------------------------------------------  Advance Directive and Living Will:      Power of :    POLST:    ------------------------------------------------------------------------------------------------------------  Anticipated Length of Stay is > 2 Beth Rogers MD        Portions of the record may have been created with voice recognition software  Occasional wrong word or "sound a like" substitutions may have occurred due to the inherent limitations of voice recognition software    Read the chart carefully and recognize, using context, where substitutions have occurred

## 2019-12-08 NOTE — ASSESSMENT & PLAN NOTE
· Lung lesion noted on the left lower lobe with 13 mm solid component, will need repeat CT scan in 6-8 weeks to exclude neoplastic process  · Left thyroid mass noted with enlargement of the left thyroid lobe, will need follow up with non-urgent ultrasound   · Left adrenal enlargement, outpatient MRI with adrenal protocol recommended (4) rarely moist

## 2019-12-08 NOTE — ASSESSMENT & PLAN NOTE
· Appears controlled on review  · Continue amlodipine 5 mg daily, labetalol 200 mg BID  · Spirinolactone at 12 5 mg daily and monitor volume status  · Monitor

## 2019-12-08 NOTE — ANESTHESIA PREPROCEDURE EVALUATION
Review of Systems/Medical History  Patient summary reviewed  Chart reviewed  No history of anesthetic complications     Cardiovascular  Negative cardio ROS Exercise tolerance (METS): <4,  Hyperlipidemia, Hypertension , CHF ,   PE,  Pulmonary  Pneumonia, Shortness of breath,        GI/Hepatic            Endo/Other  Diabetes , History of thyroid disease ,      GYN       Hematology   Musculoskeletal    Arthritis     Neurology   Psychology       For IR Lysis of PE  Echo 12/8/19:    LEFT VENTRICLE:  The cavity was small  Systolic function was vigorous  Ejection fraction was estimated to be 65 %  There were no regional wall motion abnormalities  Doppler parameters were consistent with abnormal left ventricular relaxation (grade 1 diastolic dysfunction)      VENTRICULAR SEPTUM:  There was systolic flattening  These changes are consistent with RV pressure overload      RIGHT VENTRICLE:  The ventricle was dilated  Systolic function was reduced  There was hypokinesis of the mid free wall with preservation of apical and annular contractility consistent with Gold's sign      RIGHT ATRIUM:  The atrium was mildly dilated      MITRAL VALVE:  There was mild regurgitation      TRICUSPID VALVE:  There was moderate regurgitation  Estimated peak PA pressure was 60 mmHg  The findings suggest severe pulmonary hypertension  Physical Exam    Airway  Comment: Intubated           Dental       Cardiovascular  Comment: Negative ROS, Rhythm: regular, Rate: normal, Cardiovascular exam normal    Pulmonary  Decreased breath sounds,     Other Findings        Anesthesia Plan  ASA Score- 4 Emergent    Anesthesia Type- general with ASA Monitors  Additional Monitors: arterial line and central venous line  Airway Plan: ETT  Plan Factors-  Patient did not smoke on day of surgery  Induction- inhalational     Postoperative Plan-     Informed Consent- Anesthetic plan and risks discussed with daughter    I personally reviewed this patient with the CRNA  Discussed and agreed on the Anesthesia Plan with the CRNA  Monique Wood

## 2019-12-09 ENCOUNTER — TRANSITIONAL CARE MANAGEMENT (OUTPATIENT)
Dept: INTERNAL MEDICINE CLINIC | Facility: CLINIC | Age: 84
End: 2019-12-09

## 2019-12-09 LAB
A ALTERNATA IGE QN: <0.1 KUA/I
A FUMIGATUS IGE QN: <0.1 KUA/I
ALLERGEN COMMENT: NORMAL
ATRIAL RATE: 79 BPM
ATRIAL RATE: 80 BPM
BERMUDA GRASS IGE QN: <0.1 KUA/I
BOXELDER IGE QN: <0.1 KUA/I
C HERBARUM IGE QN: <0.1 KUA/I
CAT DANDER IGE QN: <0.1 KUA/I
CMN PIGWEED IGE QN: <0.1 KUA/I
COMMON RAGWEED IGE QN: <0.1 KUA/I
COTTONWOOD IGE QN: <0.1 KUA/I
D FARINAE IGE QN: <0.1 KUA/I
D PTERONYSS IGE QN: <0.1 KUA/I
DOG DANDER IGE QN: <0.1 KUA/I
LONDON PLANE IGE QN: <0.1 KUA/I
MOUSE URINE PROT IGE QN: <0.1 KUA/I
MT JUNIPER IGE QN: <0.1 KUA/I
MUGWORT IGE QN: <0.1 KUA/I
P AXIS: 37 DEGREES
P AXIS: 43 DEGREES
P NOTATUM IGE QN: <0.1 KUA/I
PR INTERVAL: 188 MS
PR INTERVAL: 188 MS
QRS AXIS: 109 DEGREES
QRS AXIS: 112 DEGREES
QRSD INTERVAL: 83 MS
QRSD INTERVAL: 83 MS
QT INTERVAL: 354 MS
QT INTERVAL: 354 MS
QTC INTERVAL: 406 MS
QTC INTERVAL: 409 MS
ROACH IGE QN: <0.1 KUA/I
SHEEP SORREL IGE QN: <0.1 KUA/I
SILVER BIRCH IGE QN: <0.1 KUA/I
T WAVE AXIS: 63 DEGREES
T WAVE AXIS: 64 DEGREES
TIMOTHY IGE QN: <0.1 KUA/I
TOTAL IGE SMQN RAST: 16.1 KU/L (ref 0–113)
VENTRICULAR RATE: 79 BPM
VENTRICULAR RATE: 80 BPM
WALNUT IGE QN: <0.1 KUA/I
WHITE ASH IGE QN: <0.1 KUA/I
WHITE ELM IGE QN: <0.1 KUA/I
WHITE MULBERRY IGE QN: <0.1 KUA/I
WHITE OAK IGE QN: <0.1 KUA/I

## 2019-12-09 NOTE — ANESTHESIA POSTPROCEDURE EVALUATION
Post-Op Assessment Note          Comments: patient           BP      Temp     Pulse     Resp     SpO2

## 2019-12-09 NOTE — DISCHARGE SUMMARY
Discharge Summary - Gasper Terrell 80 y o  female MRN: [id number]    [facility]: [id number] PCP: Verónica Levin DO    Admission Date:   Admission Orders (From admission, onward)     Ordered        12/08/19 1621  Inpatient Admission  Once                     Admitting Diagnosis: Pulmonary embolism ([facility] [address] ) [I26 99]    HPI: Per Dr Fabrizio Teixeira on admission 12/9/19: "Cameron Delarosa is a 80 y o  female with a past medical history significant for HTN, HLD, and DMII who presents with acute hypoxic respiratory failure secondary to PE  She initially presented to the ED on 12/4 for worsening SOB  CT chest and CXR at that time were concerning for pneumonia and she was started on ceftriaxone and azithromycin  Azithromycin was later dced  During her admission her SOB became progressively worse requiring NC  A rapid response was called on 12/8 for acute worsening of her SPO2  She was having agonal respirations and was bradycardic and hypotensive  She was intubated and started on pressors, which were quickly titrated off  CTA was performed and showed significant bilateral PEs worse on R than L with enlarged RV  She was saturated on a heparin drip  Echo showed EF 65% with hypokinesis and reduced RV function  She was transferred to [facility] for IR thrombolysis "      Procedures Performed:   Orders Placed This Encounter   Procedures   [address] Course:   Patient initially presented to [facility] for worsening dyspnea, she underwent her carlton and imaging evaluation  Patient was found to have a pneumonia, she was treated for community-acquired pneumonia  In her laboratory evaluation patient had elevated BNP as well as troponin  Patient was admitted to [facility] for pneumonia and elevated troponin on 12/04/2019  Patient continued have dyspnea and on 12/08/2019, she had a hypoxic and bradycardic and then after ambulating to the bathroom    A rapid response was called and patient was minimally responsive with agonal breathing, she was intubated at this time  Patient was transferred to the ICU and was more alert on arrival   Bedside ultrasound was performed which showed a enlarged right ventricle, patient underwent CTA of his chest which revealed bilateral pulmonary emboli as well as right subclavian vein occlusive thrombus and thrombus in his superior vena cava  Discussion was had with family regarding treatment options, family chose to proceed with IR treatment of emboli  Patient was transferred to [facility]  Shortly after arrival patient was taken to IR suite, during placement of the left catheter patient became hypotensive and unresponsive to pressors  Patient went into cardiac arrest and code following ACLS was followed with chest compressions  Discussion was had with family members, family requests to stop compressions  Significant Findings, Care, Treatment and Services Provided:   CTA chest 19:  Bilateral pulmonary emboli with RV strain, right subclavian embolus extending to right brachiocephalic and SVC  CT chest 19:  Possible neoplastic lesion in the superior segment of left upper lobe    IR pulmonary lysis    Complications: acute hypoxic respiratory failure due to pulmonary emboli    Disposition:      Final Diagnosis: Bilateral pulmonary embolus    Condition at Time of Death:  Intubated prior to arrival at [facility], CPR in progress      Date, Time and Cause of Death    Date of Death:  19  Time of Death:   6:48 PM  Preliminary Cause of Death:  Acute pulmonary embolism ([facility] [address] )  Entered by:  Charlotte Palmer     MC1 1 Katheryn Le DO 19 19:05

## 2019-12-09 NOTE — DEATH NOTE
INPATIENT DEATH NOTE  Alyssa Terrell 80 y o  female MRN: [id number]  [facility] [id number]    Date, Time and Cause of Death    Date of Death:  19  Time of Death:   6:48 PM  Preliminary Cause of Death:  Acute pulmonary embolism ([facility] [address] )  Entered by:  Robert Palmer     1 1 2437 [facility],  19 19:05               PHYSICAL EXAM:  Unresponsive to noxious stimuli, Spontaneous respirations absent, Breath sounds absent, Carotid pulse absent, Heart sounds absent, Pupillary light reflex absent and Corneal blink reflex absent    Medical Examiner notification criteria:  Patient  within 24 hours of arrival to hospital; within 24hrs of arrival and admission to [facility], was transferred from and admitted to [facility] on 19  Medical Examiner's office notified?:  Yes   Medical Examiner accepted case?:  No           Autopsy Options:  Post-mortem examination declined by next of kin    Primary Service Attending Physician notified?:  yes - Attending:  Vamsi Sears DO    Family was notified in Radiology waiting area, patient and family were moved to ICU room from radiology department      Physician/Resident responsible for completing Discharge Summary:  Lexx Vu

## 2019-12-09 NOTE — ANESTHESIA PROCEDURE NOTES
Arterial Line Insertion  Performed by: Yenni Velasquez CRNA  Authorized by: Javy Neil MD   Consent: Verbal consent obtained    Risks and benefits: risks, benefits and alternatives were discussed  Consent given by: patient  Patient identity confirmed: arm band  Indications: multiple ABGs and respiratory failure  Orientation:  Left  Location: radial artery  Procedure Details:  Number of attempts: 1

## 2019-12-09 NOTE — SEDATION DOCUMENTATION
Pt started to decline intraprocedure, code called and compressions started  Pulmonary Critical Care attending at bedside  Time of death called  Removed drains and lines from pt as well as redressed  Pt brought back to MICU

## 2019-12-10 NOTE — PHYSICIAN ADVISOR
Current patient class: Inpatient  The patient is currently on Hospital Day: 1 at [facility]      The patient was admitted to the hospital at [phone] on 12/8/19 for the following diagnosis:  Pulmonary embolism ([address] ) [I26 99]       There is documentation in the medical record of an expected length of stay of at least 2 midnights  The patient is therefore expected to satisfy the 2 midnight benchmark and given the 2 midnight presumption is appropriate for INPATIENT ADMISSION  Given this expectation of a satisfying stay, CMS instructs us that the patient is most often appropriate for inpatient admission under part A provided medical necessity is documented in the chart  After review of the relevant documentation, labs, vital signs and test results, the patient is appropriate for INPATIENT ADMISSION  Admission to the hospital as an inpatient is a complex decision making process which requires the practitioner to consider the patients presenting complaint, history and physical examination and all relevant testing  With this in mind, in this case, the patient was deemed appropriate for INPATIENT ADMISSION  After review of the documentation and testing available at the time of the admission I concur with this clinical determination of medical necessity  Rationale is as follows: The patient is a 80 yrs old Female who presented to the ED at 12/8/2019  4:18 PM with a chief complaint of transfer from [facility] for IR thrombolysis  Patient previously admitted to [facility] on 12/4/19 for pneumonia but during hospital course was rapid response for hypoxia and hypotension and found to have bilateral PE with RV strain  She was found to have subclavian and SVC clot burden  Given heart strain [facility]    On 12/8/19 at [facility] during IR procedure patient became hypotensive initially responsive to pressors then not responsive to pressor and patient became asystolic  Code blue was called and patient subsequently   The patients vitals on arrival were ED Triage Vitals [19 1631]   Temperature Pulse Respirations Blood Pressure SpO2   (!) 102 2 °F (39 °C) 78 (!) 24 101/59 98 %      Temp Source Heart Rate Source Patient Position - Orthostatic VS BP Location FiO2 (%)   Probe Monitor -- -- --      Pain Score       No Pain           Past Medical History:   Diagnosis Date    Diabetes mellitus ([facility] [address] )     Hematuria     2017 RESOLVED    Hypertension      Past Surgical History:   Procedure Laterality Date    HYSTERECTOMY      IR PULMONARY LYSIS  2019    REPLACEMENT TOTAL KNEE      TONSILLECTOMY             Consults have been placed to:   None    Vitals:    19 1631   BP: 101/59   Pulse: 78   Resp: (!) 24   Temp: (!) 102 2 °F (39 °C)   TempSrc: Probe   SpO2: 98%       Most recent labs:    Recent Labs     19  1743   WBC 14 24*   HGB 12 7   HCT 40 0      K 4 5   CALCIUM 8 9   BUN 45*   CREATININE 1 13   INR 1 48*   AST 37   ALT 65   ALKPHOS 153*       Scheduled Meds:  Continuous Infusions:  No current facility-administered medications for this encounter  PRN Meds:      Surgical procedures (if appropriate):

## 2019-12-12 LAB
BASE EXCESS BLDA CALC-SCNC: -2 MMOL/L (ref -2–3)
CA-I BLD-SCNC: 1.26 MMOL/L (ref 1.12–1.32)
GLUCOSE SERPL-MCNC: 178 MG/DL (ref 65–140)
HCO3 BLDA-SCNC: 21.6 MMOL/L (ref 22–28)
HCT VFR BLD CALC: 36 % (ref 34.8–46.1)
HGB BLDA-MCNC: 12.2 G/DL (ref 11.5–15.4)
KCT BLD-ACNC: 166 SEC (ref 89–137)
PCO2 BLD: 23 MMOL/L (ref 21–32)
PCO2 BLD: 33.3 MM HG (ref 36–44)
PH BLD: 7.42 [PH] (ref 7.35–7.45)
PO2 BLD: 120 MM HG (ref 75–129)
POTASSIUM BLD-SCNC: 4.6 MMOL/L (ref 3.5–5.3)
SAO2 % BLD FROM PO2: 99 % (ref 60–85)
SODIUM BLD-SCNC: 140 MMOL/L (ref 136–145)
SPECIMEN SOURCE: ABNORMAL
SPECIMEN SOURCE: ABNORMAL

## 2019-12-12 NOTE — CLINICAL RISK MANAGEMENT
Progress Note - Death in Restraints   Sonny Terrell 80 y o  female MRN: [id number]  [facility]: [id number]      Patient  while restrained  with Soft restraint right wrist and Soft restraint left wrist  Death unrelated to use of restraints  This situation was tracked internally  CMS and PA-SELENA  notification not required

## 2020-02-12 DIAGNOSIS — I10 HYPERTENSION, UNSPECIFIED TYPE: ICD-10-CM

## 2020-02-12 RX ORDER — LABETALOL 200 MG/1
TABLET, FILM COATED ORAL
Qty: 180 TABLET | Refills: 1 | OUTPATIENT
Start: 2020-02-12